# Patient Record
Sex: FEMALE | Race: WHITE | NOT HISPANIC OR LATINO | Employment: UNEMPLOYED
[De-identification: names, ages, dates, MRNs, and addresses within clinical notes are randomized per-mention and may not be internally consistent; named-entity substitution may affect disease eponyms.]

---

## 2017-01-20 ENCOUNTER — HOSPITAL (OUTPATIENT)
Dept: OTHER | Age: 72
End: 2017-01-20
Attending: INTERNAL MEDICINE

## 2017-02-08 ENCOUNTER — PRIOR ORIGINAL RECORDS (OUTPATIENT)
Dept: OTHER | Age: 72
End: 2017-02-08

## 2017-02-08 ENCOUNTER — HOSPITAL (OUTPATIENT)
Dept: OTHER | Age: 72
End: 2017-02-08
Attending: RADIOLOGY

## 2017-02-15 ENCOUNTER — CHARTING TRANS (OUTPATIENT)
Dept: OTHER | Age: 72
End: 2017-02-15

## 2017-02-22 ENCOUNTER — PRIOR ORIGINAL RECORDS (OUTPATIENT)
Dept: OTHER | Age: 72
End: 2017-02-22

## 2017-02-22 PROCEDURE — 81001 URINALYSIS AUTO W/SCOPE: CPT | Performed by: INTERNAL MEDICINE

## 2017-02-22 PROCEDURE — 82043 UR ALBUMIN QUANTITATIVE: CPT | Performed by: INTERNAL MEDICINE

## 2017-02-22 PROCEDURE — 82570 ASSAY OF URINE CREATININE: CPT | Performed by: INTERNAL MEDICINE

## 2017-02-22 PROCEDURE — 36415 COLL VENOUS BLD VENIPUNCTURE: CPT | Performed by: INTERNAL MEDICINE

## 2017-02-27 ENCOUNTER — CHARTING TRANS (OUTPATIENT)
Dept: OTHER | Age: 72
End: 2017-02-27

## 2017-02-28 ENCOUNTER — HOSPITAL (OUTPATIENT)
Dept: OTHER | Age: 72
End: 2017-02-28
Attending: ORTHOPAEDIC SURGERY

## 2017-02-28 LAB
BUN SERPL-MCNC: 19 MG/DL (ref 6–20)
BUN/CREAT SERPL: 23 (ref 7–25)
CREAT SERPL-MCNC: 0.83 MG/DL (ref 0.51–0.95)
GLUCOSE BLDC GLUCOMTR-MCNC: 106 MG/DL (ref 65–99)
GLUCOSE BLDC GLUCOMTR-MCNC: 126 MG/DL (ref 65–99)
GLUCOSE BLDC GLUCOMTR-MCNC: 132 MG/DL (ref 65–99)
GLUCOSE BLDC GLUCOMTR-MCNC: 135 MG/DL (ref 65–99)
GLUCOSE BLDC GLUCOMTR-MCNC: 161 MG/DL (ref 65–99)
INR PPP: 1
PROTHROMBIN TIME: 10.9 SECONDS (ref 9.7–11.8)
PROTHROMBIN TIME: NORMAL

## 2017-03-01 LAB
ANALYZER ANC (IANC): ABNORMAL
ANION GAP SERPL CALC-SCNC: 12 MMOL/L (ref 10–20)
BASOPHILS # BLD: 0 THOUSAND/MCL (ref 0–0.3)
BASOPHILS NFR BLD: 0 %
BUN SERPL-MCNC: 25 MG/DL (ref 6–20)
BUN/CREAT SERPL: 24 (ref 7–25)
CALCIUM SERPL-MCNC: 8.5 MG/DL (ref 8.4–10.2)
CHLORIDE: 107 MMOL/L (ref 98–107)
CO2 SERPL-SCNC: 29 MMOL/L (ref 21–32)
CREAT SERPL-MCNC: 1.03 MG/DL (ref 0.51–0.95)
DIFFERENTIAL METHOD BLD: ABNORMAL
EOSINOPHIL # BLD: 0.1 THOUSAND/MCL (ref 0.1–0.5)
EOSINOPHIL NFR BLD: 1 %
ERYTHROCYTE [DISTWIDTH] IN BLOOD: 14.7 % (ref 11–15)
GLUCOSE BLDC GLUCOMTR-MCNC: 105 MG/DL (ref 65–99)
GLUCOSE BLDC GLUCOMTR-MCNC: 141 MG/DL (ref 65–99)
GLUCOSE BLDC GLUCOMTR-MCNC: 157 MG/DL (ref 65–99)
GLUCOSE BLDC GLUCOMTR-MCNC: 170 MG/DL (ref 65–99)
GLUCOSE BLDC GLUCOMTR-MCNC: 98 MG/DL (ref 65–99)
GLUCOSE SERPL-MCNC: 112 MG/DL (ref 65–99)
HEMATOCRIT: 36.5 % (ref 36–46.5)
HGB BLD-MCNC: 11.3 GM/DL (ref 12–15.5)
LYMPHOCYTES # BLD: 1.8 THOUSAND/MCL (ref 1–4)
LYMPHOCYTES NFR BLD: 20 %
MCH RBC QN AUTO: 27.4 PG (ref 26–34)
MCHC RBC AUTO-ENTMCNC: 31 GM/DL (ref 32–36.5)
MCV RBC AUTO: 88.6 FL (ref 78–100)
MONOCYTES # BLD: 1.1 THOUSAND/MCL (ref 0.3–0.9)
MONOCYTES NFR BLD: 12 %
NEUTROPHILS # BLD: 6.1 THOUSAND/MCL (ref 1.8–7.7)
NEUTROPHILS NFR BLD: 67 %
NEUTS SEG NFR BLD: ABNORMAL %
PERCENT NRBC: ABNORMAL
PLATELET # BLD: 183 THOUSAND/MCL (ref 140–450)
POTASSIUM SERPL-SCNC: 5 MMOL/L (ref 3.4–5.1)
RBC # BLD: 4.12 MILLION/MCL (ref 4–5.2)
SODIUM SERPL-SCNC: 143 MMOL/L (ref 135–145)
WBC # BLD: 9.1 THOUSAND/MCL (ref 4.2–11)

## 2017-03-02 LAB
ANALYZER ANC (IANC): ABNORMAL
ANION GAP SERPL CALC-SCNC: 11 MMOL/L (ref 10–20)
BASOPHILS # BLD: 0 THOUSAND/MCL (ref 0–0.3)
BASOPHILS NFR BLD: 0 %
BUN SERPL-MCNC: 27 MG/DL (ref 6–20)
BUN/CREAT SERPL: 27 (ref 7–25)
CALCIUM SERPL-MCNC: 8.9 MG/DL (ref 8.4–10.2)
CHLORIDE: 105 MMOL/L (ref 98–107)
CO2 SERPL-SCNC: 28 MMOL/L (ref 21–32)
CREAT SERPL-MCNC: 1 MG/DL (ref 0.51–0.95)
DIFFERENTIAL METHOD BLD: ABNORMAL
EOSINOPHIL # BLD: 0.3 THOUSAND/MCL (ref 0.1–0.5)
EOSINOPHIL NFR BLD: 3 %
ERYTHROCYTE [DISTWIDTH] IN BLOOD: 14.9 % (ref 11–15)
GLUCOSE BLDC GLUCOMTR-MCNC: 107 MG/DL (ref 65–99)
GLUCOSE BLDC GLUCOMTR-MCNC: 115 MG/DL (ref 65–99)
GLUCOSE BLDC GLUCOMTR-MCNC: 91 MG/DL (ref 65–99)
GLUCOSE SERPL-MCNC: 116 MG/DL (ref 65–99)
HEMATOCRIT: 33.3 % (ref 36–46.5)
HGB BLD-MCNC: 10.4 GM/DL (ref 12–15.5)
LYMPHOCYTES # BLD: 1.2 THOUSAND/MCL (ref 1–4)
LYMPHOCYTES NFR BLD: 14 %
MCH RBC QN AUTO: 27.6 PG (ref 26–34)
MCHC RBC AUTO-ENTMCNC: 31.2 GM/DL (ref 32–36.5)
MCV RBC AUTO: 88.3 FL (ref 78–100)
MONOCYTES # BLD: 1 THOUSAND/MCL (ref 0.3–0.9)
MONOCYTES NFR BLD: 13 %
NEUTROPHILS # BLD: 5.7 THOUSAND/MCL (ref 1.8–7.7)
NEUTROPHILS NFR BLD: 70 %
NEUTS SEG NFR BLD: ABNORMAL %
PERCENT NRBC: ABNORMAL
PLATELET # BLD: 161 THOUSAND/MCL (ref 140–450)
POTASSIUM SERPL-SCNC: 4.4 MMOL/L (ref 3.4–5.1)
RBC # BLD: 3.77 MILLION/MCL (ref 4–5.2)
SODIUM SERPL-SCNC: 140 MMOL/L (ref 135–145)
WBC # BLD: 8.2 THOUSAND/MCL (ref 4.2–11)

## 2017-03-16 PROBLEM — Z47.89 ORTHOPEDIC AFTERCARE: Status: ACTIVE | Noted: 2017-03-16

## 2017-03-17 PROBLEM — Z96.651 STATUS POST TOTAL KNEE REPLACEMENT, RIGHT: Status: ACTIVE | Noted: 2017-03-17

## 2017-06-02 PROBLEM — C50.912 MALIGNANT NEOPLASM OF LEFT FEMALE BREAST, UNSPECIFIED ESTROGEN RECEPTOR STATUS, UNSPECIFIED SITE OF BREAST (HCC): Status: ACTIVE | Noted: 2017-06-02

## 2017-07-06 PROBLEM — S52.552A OTHER CLOSED EXTRA-ARTICULAR FRACTURE OF DISTAL END OF LEFT RADIUS, INITIAL ENCOUNTER: Status: ACTIVE | Noted: 2017-07-06

## 2017-07-12 ENCOUNTER — PRIOR ORIGINAL RECORDS (OUTPATIENT)
Dept: OTHER | Age: 72
End: 2017-07-12

## 2017-08-07 PROBLEM — S52.552D CLOSED EXTRAARTICULAR FRACTURE OF DISTAL RADIUS, LEFT, WITH ROUTINE HEALING, SUBSEQUENT ENCOUNTER: Status: ACTIVE | Noted: 2017-07-06

## 2017-09-06 PROBLEM — Z96.653 STATUS POST TOTAL BILATERAL KNEE REPLACEMENT: Status: ACTIVE | Noted: 2017-03-17

## 2017-10-12 PROBLEM — G47.33 OSA (OBSTRUCTIVE SLEEP APNEA): Status: ACTIVE | Noted: 2017-10-12

## 2017-10-12 PROBLEM — S52.552D CLOSED EXTRAARTICULAR FRACTURE OF DISTAL RADIUS, LEFT, WITH ROUTINE HEALING, SUBSEQUENT ENCOUNTER: Status: RESOLVED | Noted: 2017-07-06 | Resolved: 2017-10-12

## 2017-10-12 PROBLEM — M85.80 OSTEOPENIA, UNSPECIFIED LOCATION: Status: ACTIVE | Noted: 2017-10-12

## 2017-11-15 ENCOUNTER — PRIOR ORIGINAL RECORDS (OUTPATIENT)
Dept: OTHER | Age: 72
End: 2017-11-15

## 2017-12-31 ENCOUNTER — PRIOR ORIGINAL RECORDS (OUTPATIENT)
Dept: OTHER | Age: 72
End: 2017-12-31

## 2018-01-23 ENCOUNTER — HOSPITAL (OUTPATIENT)
Dept: OTHER | Age: 73
End: 2018-01-23
Attending: INTERNAL MEDICINE

## 2018-02-08 ENCOUNTER — HOSPITAL (OUTPATIENT)
Dept: OTHER | Age: 73
End: 2018-02-08
Attending: RADIOLOGY

## 2018-02-08 ENCOUNTER — PRIOR ORIGINAL RECORDS (OUTPATIENT)
Dept: OTHER | Age: 73
End: 2018-02-08

## 2018-02-22 ENCOUNTER — CHARTING TRANS (OUTPATIENT)
Dept: OTHER | Age: 73
End: 2018-02-22

## 2018-03-14 ENCOUNTER — PRIOR ORIGINAL RECORDS (OUTPATIENT)
Dept: OTHER | Age: 73
End: 2018-03-14

## 2018-03-19 PROBLEM — S82.821A CLOSED TORUS FRACTURE OF DISTAL END OF RIGHT FIBULA, INITIAL ENCOUNTER: Status: ACTIVE | Noted: 2018-03-19

## 2018-04-30 PROBLEM — N18.30 CKD (CHRONIC KIDNEY DISEASE) STAGE 3, GFR 30-59 ML/MIN (HCC): Status: ACTIVE | Noted: 2018-04-30

## 2018-09-12 ENCOUNTER — PRIOR ORIGINAL RECORDS (OUTPATIENT)
Dept: OTHER | Age: 73
End: 2018-09-12

## 2018-11-05 VITALS
SYSTOLIC BLOOD PRESSURE: 138 MMHG | HEIGHT: 68 IN | WEIGHT: 293 LBS | BODY MASS INDEX: 44.41 KG/M2 | DIASTOLIC BLOOD PRESSURE: 73 MMHG

## 2019-01-09 PROBLEM — Z96.653 STATUS POST BILATERAL KNEE REPLACEMENTS: Status: ACTIVE | Noted: 2019-01-09

## 2019-01-09 PROBLEM — S82.821A CLOSED TORUS FRACTURE OF DISTAL END OF RIGHT FIBULA, INITIAL ENCOUNTER: Status: RESOLVED | Noted: 2018-03-19 | Resolved: 2019-01-09

## 2019-01-09 PROCEDURE — 36415 COLL VENOUS BLD VENIPUNCTURE: CPT | Performed by: INTERNAL MEDICINE

## 2019-01-09 PROCEDURE — 82570 ASSAY OF URINE CREATININE: CPT | Performed by: INTERNAL MEDICINE

## 2019-01-09 PROCEDURE — 82043 UR ALBUMIN QUANTITATIVE: CPT | Performed by: INTERNAL MEDICINE

## 2019-02-05 ENCOUNTER — HOSPITAL (OUTPATIENT)
Dept: OTHER | Age: 74
End: 2019-02-05
Attending: INTERNAL MEDICINE

## 2019-02-14 PROCEDURE — 88305 TISSUE EXAM BY PATHOLOGIST: CPT | Performed by: INTERNAL MEDICINE

## 2019-03-13 ENCOUNTER — PRIOR ORIGINAL RECORDS (OUTPATIENT)
Dept: OTHER | Age: 74
End: 2019-03-13

## 2019-09-11 ENCOUNTER — PRIOR ORIGINAL RECORDS (OUTPATIENT)
Dept: OTHER | Age: 74
End: 2019-09-11

## 2019-09-30 PROBLEM — I50.32 CHRONIC DIASTOLIC CHF (CONGESTIVE HEART FAILURE) (HCC): Status: ACTIVE | Noted: 2019-09-30

## 2020-02-07 ENCOUNTER — HOSPITAL (OUTPATIENT)
Dept: OTHER | Age: 75
End: 2020-02-07
Attending: INTERNAL MEDICINE

## 2020-02-09 VITALS
WEIGHT: 293 LBS | BODY MASS INDEX: 43.4 KG/M2 | SYSTOLIC BLOOD PRESSURE: 132 MMHG | RESPIRATION RATE: 16 BRPM | DIASTOLIC BLOOD PRESSURE: 79 MMHG | HEART RATE: 94 BPM | HEIGHT: 69 IN

## 2020-02-09 VITALS
BODY MASS INDEX: 43.4 KG/M2 | HEART RATE: 97 BPM | DIASTOLIC BLOOD PRESSURE: 80 MMHG | HEIGHT: 69 IN | WEIGHT: 293 LBS | RESPIRATION RATE: 20 BRPM | SYSTOLIC BLOOD PRESSURE: 141 MMHG

## 2020-03-13 ENCOUNTER — PRIOR ORIGINAL RECORDS (OUTPATIENT)
Dept: OTHER | Age: 75
End: 2020-03-13

## 2020-03-13 ENCOUNTER — HOSPITAL (OUTPATIENT)
Dept: OTHER | Age: 75
End: 2020-03-13

## 2020-03-13 PROCEDURE — 99213 OFFICE O/P EST LOW 20 MIN: CPT | Performed by: INTERNAL MEDICINE

## 2020-04-01 ENCOUNTER — HOSPITAL (OUTPATIENT)
Dept: OTHER | Age: 75
End: 2020-04-01

## 2020-05-01 ENCOUNTER — HOSPITAL (OUTPATIENT)
Dept: OTHER | Age: 75
End: 2020-05-01

## 2020-06-01 ENCOUNTER — HOSPITAL (OUTPATIENT)
Dept: OTHER | Age: 75
End: 2020-06-01

## 2020-07-01 ENCOUNTER — HOSPITAL (OUTPATIENT)
Dept: OTHER | Age: 75
End: 2020-07-01
Attending: INTERNAL MEDICINE

## 2020-09-15 ENCOUNTER — HOSPITAL (OUTPATIENT)
Dept: OTHER | Age: 75
End: 2020-09-15
Attending: INTERNAL MEDICINE

## 2020-09-16 ENCOUNTER — HOSPITAL (OUTPATIENT)
Dept: OTHER | Age: 75
End: 2020-09-16
Attending: INTERNAL MEDICINE

## 2020-09-18 ENCOUNTER — PRIOR ORIGINAL RECORDS (OUTPATIENT)
Dept: OTHER | Age: 75
End: 2020-09-18

## 2020-09-18 PROCEDURE — 99214 OFFICE O/P EST MOD 30 MIN: CPT | Performed by: INTERNAL MEDICINE

## 2020-10-09 LAB
ALBUMIN/GLOB SERPL: 1.4 (CALC) (ref 1–2.5)
ALBUMIN/GLOB SERPL: 1.7 (CALC) (ref 1–2.5)
ALBUMIN: 3.9 G/DL (ref 3.6–5.1)
ALBUMIN: 4 G/DL (ref 3.6–5.1)
ALKALINE PHOSPHATASE: 52 UNIT/L (ref 33–130)
ALKALINE PHOSPHATASE: 60 UNIT/L (ref 33–130)
ALT: 14 UNIT/L (ref 6–29)
ALT: 14 UNIT/L (ref 6–29)
AST: 11 UNIT/L (ref 10–35)
AST: 14 UNIT/L (ref 10–35)
BASO%: 0.1 %
BASO%: 0.2 %
BASO%: 0.2 %
BASO%: 0.3 %
BASO%: 0.3 %
BASO: 0 10^3/UL
BILIRUBIN, TOTAL: 0.5 MG/DL (ref 0.2–1.2)
BILIRUBIN, TOTAL: 0.6 MG/DL (ref 0.2–1.2)
BUN/CREATININE RATIO: 33 (CALC) (ref 6–22)
BUN/CREATININE RATIO: ABNORMAL (CALC) (ref 6–22)
CA 15-3: 11 UNIT/ML
CA 15-3: 8 UNIT/ML
CA 27.29: 16 UNIT/ML
CA 27.29: 18 UNIT/ML
CALCIUM: 9.2 MG/DL (ref 8.6–10.4)
CALCIUM: 9.5 MG/DL (ref 8.6–10.4)
CARBON DIOXIDE: 22 MMOL/L (ref 20–32)
CARBON DIOXIDE: 24 MMOL/L (ref 20–32)
CEA: 0.6 NG/ML
CEA: 0.9 NG/ML
CHLORIDE: 102 MMOL/L (ref 98–110)
CHLORIDE: 106 MMOL/L (ref 98–110)
CRCL (C&G) (MOSAIQ HL): 127.31 ML/MIN
CRCL (C&G) (MOSAIQ HL): 66.02 ML/MIN
CRCL (C&G) (MOSAIQ HL): 94 ML/MIN
CREATININE CLEARANCE (MOSAIQ HL): 30.4 ML/MIN
CREATININE CLEARANCE (MOSAIQ HL): 56 ML/MIN
CREATININE: 0.88 MG/DL (ref 0.6–0.93)
CREATININE: 1.62 MG/DL (ref 0.6–0.93)
EGFR AFRICAN AMERICAN: 36 ML/MIN/1.73M2
EGFR AFRICAN AMERICAN: 75 ML/MIN/1.73M2
EGFR NON-AFR. AMERICAN: 31 ML/MIN/1.73M2
EGFR NON-AFR. AMERICAN: 65 ML/MIN/1.73M2
EOS%: 1.3 %
EOS%: 1.8 %
EOS%: 2.1 %
EOS%: 2.3 %
EOS%: 2.5 %
EOS: 0.1 10^3/UL
EOS: 0.2 10^3/UL
GLOBULIN: 2.3 G/DL (CALC) (ref 1.9–3.7)
GLOBULIN: 2.8 G/DL (CALC) (ref 1.9–3.7)
GLUCOSE: 145 MG/DL (ref 65–99)
GLUCOSE: 166 MG/DL (ref 65–99)
HCT: 41.8 % (ref 38–54)
HCT: 43.2 % (ref 38–54)
HCT: 43.8 % (ref 38–54)
HCT: 43.9 % (ref 38–54)
HCT: 44.1 % (ref 38–54)
HCT: 45.7 % (ref 38–54)
HCT: 46.3 % (ref 38–54)
HCT: 47.5 % (ref 38–54)
HGB: 13.9 G/DL (ref 12–18)
HGB: 14.1 G/DL (ref 12–18)
HGB: 14.2 G/DL (ref 12–18)
HGB: 14.3 G/DL (ref 12–18)
HGB: 14.5 G/DL (ref 12–18)
HGB: 14.8 G/DL (ref 12–18)
HGB: 15.1 G/DL (ref 12–18)
HGB: 15.3 G/DL (ref 12–18)
LYMPH%: 17.6 % (ref 12–44)
LYMPH%: 17.9 % (ref 12–44)
LYMPH%: 18.3 % (ref 12–44)
LYMPH%: 20.4 % (ref 12–44)
LYMPH%: 20.9 % (ref 12–44)
LYMPH%: 21 % (ref 12–44)
LYMPH%: 23.1 % (ref 12–44)
LYMPH%: 23.8 % (ref 12–44)
LYMPH: 1.5 10^3/UL (ref 0.8–2.8)
LYMPH: 1.6 10^3/UL (ref 0.8–2.8)
LYMPH: 1.6 10^3/UL (ref 0.8–2.8)
LYMPH: 1.7 10^3/UL (ref 0.8–2.8)
LYMPH: 1.7 10^3/UL (ref 0.8–2.8)
LYMPH: 1.8 10^3/UL (ref 0.8–2.8)
LYMPH: 1.9 10^3/UL (ref 0.8–2.8)
LYMPH: 2.2 10^3/UL (ref 0.8–2.8)
MCH: 28.3 PG (ref 26–33)
MCH: 29 PG (ref 26–33)
MCH: 29.1 PG (ref 26–33)
MCH: 30.4 PG (ref 26–33)
MCH: 30.5 PG (ref 26–33)
MCH: 30.9 PG (ref 26–33)
MCH: 30.9 PG (ref 26–33)
MCH: 31 PG (ref 26–33)
MCHC: 31.7 G/DL (ref 31–36)
MCHC: 32 G/DL (ref 31–36)
MCHC: 32.2 G/DL (ref 31–36)
MCHC: 32.4 G/DL (ref 31–36)
MCHC: 32.9 G/DL (ref 31–36)
MCHC: 33 G/DL (ref 31–36)
MCHC: 33 G/DL (ref 31–36)
MCHC: 33.7 G/DL (ref 31–36)
MCV: 88.3 FML (ref 82–100)
MCV: 89 FML (ref 82–100)
MCV: 91.1 FML (ref 82–100)
MCV: 91.7 FML (ref 82–100)
MCV: 92 FML (ref 82–100)
MCV: 93.6 FML (ref 82–100)
MCV: 94.6 FML (ref 82–100)
MCV: 95.7 FML (ref 82–100)
MONO%: 6.8 % (ref 2–12)
MONO%: 7 % (ref 2–12)
MONO%: 7.3 % (ref 2–12)
MONO%: 7.6 % (ref 2–12)
MONO%: 7.8 % (ref 2–12)
MONO%: 8.4 % (ref 2–12)
MONO%: 9 % (ref 2–12)
MONO%: 9.1 % (ref 2–12)
MONO: 0.6 10^3/UL (ref 0.2–1)
MONO: 0.7 10^3/UL (ref 0.2–1)
MONO: 0.8 10^3/UL (ref 0.2–1)
MPV: 10.3 FML (ref 8.6–11.7)
MPV: 10.4 FML (ref 8.6–11.7)
MPV: 9 FML (ref 8.6–11.7)
MPV: 9.3 FML (ref 8.6–11.7)
MPV: 9.4 FML (ref 8.6–11.7)
MPV: 9.6 FML (ref 8.6–11.7)
MPV: 9.7 FML (ref 8.6–11.7)
MPV: 9.9 FML (ref 8.6–11.7)
NEUT%: 66 % (ref 47–76)
NEUT%: 66.3 % (ref 47–76)
NEUT%: 67.8 % (ref 47–76)
NEUT%: 69.1 % (ref 47–76)
NEUT%: 69.9 % (ref 47–76)
NEUT%: 71 % (ref 47–76)
NEUT%: 72.5 % (ref 47–76)
NEUT%: 73.7 % (ref 47–76)
NEUT: 5.4 10^3/UL (ref 1.5–7.1)
NEUT: 5.4 10^3/UL (ref 1.5–7.1)
NEUT: 5.7 10^3/UL (ref 1.5–7.1)
NEUT: 5.9 10^3/UL (ref 1.5–7.1)
NEUT: 6 10^3/UL (ref 1.5–7.1)
NEUT: 6 10^3/UL (ref 1.5–7.1)
NEUT: 6.3 10^3/UL (ref 1.5–7.1)
NEUT: 7 10^3/UL (ref 1.5–7.1)
PLT: 175 10^3/UL (ref 150–375)
PLT: 184 10^3/UL (ref 150–375)
PLT: 201 10^3/UL (ref 150–375)
PLT: 201 10^3/UL (ref 150–375)
PLT: 203 10^3/UL (ref 150–375)
PLT: 206 10^3/UL (ref 150–375)
PLT: 232 10^3/UL (ref 150–375)
PLT: 232 10^3/UL (ref 150–375)
POTASSIUM: 4.5 MMOL/L (ref 3.5–5.3)
POTASSIUM: 4.9 MMOL/L (ref 3.5–5.3)
PROTEIN, TOTAL: 6.3 G/DL (ref 6.1–8.1)
PROTEIN, TOTAL: 6.7 G/DL (ref 6.1–8.1)
RBC: 4.56 10^6/UL (ref 4.2–6.2)
RBC: 4.61 10^6/UL (ref 4.2–6.2)
RBC: 4.77 10^6/UL (ref 4.2–6.2)
RBC: 4.88 10^6/UL (ref 4.2–6.2)
RBC: 4.89 10^6/UL (ref 4.2–6.2)
RBC: 4.92 10^6/UL (ref 4.2–6.2)
RBC: 5.02 10^6/UL (ref 4.2–6.2)
RBC: 5.08 10^6/UL (ref 4.2–6.2)
RDW-CV: 13.8 %
RDW-CV: 13.9 %
RDW-CV: 14.2 %
RDW-CV: 14.2 %
RDW-CV: 14.4 %
RDW-CV: 14.6 %
RDW-CV: 14.6 %
RDW-CV: 15.9 %
RDW-SD: 45.3 FML (ref 36–50)
RDW-SD: 46.1 FML (ref 36–50)
RDW-SD: 46.7 FML (ref 36–50)
RDW-SD: 47 FML (ref 36–50)
RDW-SD: 47 FML (ref 36–50)
RDW-SD: 47.8 FML (ref 36–50)
RDW-SD: 48.2 FML (ref 36–50)
RDW-SD: 50.1 FML (ref 36–50)
SODIUM: 140 MMOL/L (ref 135–146)
SODIUM: 145 MMOL/L (ref 135–146)
UREA NITROGEN (BUN): 18 MG/DL (ref 7–25)
UREA NITROGEN (BUN): 54 MG/DL (ref 7–25)
WBC: 8 10^3/UL (ref 4.3–11)
WBC: 8.2 10^3/UL (ref 4.3–11)
WBC: 8.3 10^3/UL (ref 4.3–11)
WBC: 8.3 10^3/UL (ref 4.3–11)
WBC: 8.6 10^3/UL (ref 4.3–11)
WBC: 8.7 10^3/UL (ref 4.3–11)
WBC: 9.1 10^3/UL (ref 4.3–11)
WBC: 9.6 10^3/UL (ref 4.3–11)

## 2020-10-11 VITALS — SYSTOLIC BLOOD PRESSURE: 126 MMHG | WEIGHT: 293 LBS | BODY MASS INDEX: 44.85 KG/M2 | DIASTOLIC BLOOD PRESSURE: 74 MMHG

## 2020-10-11 VITALS
DIASTOLIC BLOOD PRESSURE: 84 MMHG | SYSTOLIC BLOOD PRESSURE: 122 MMHG | WEIGHT: 293 LBS | HEIGHT: 67 IN | BODY MASS INDEX: 45.99 KG/M2

## 2020-10-11 VITALS
BODY MASS INDEX: 45.99 KG/M2 | HEIGHT: 67 IN | WEIGHT: 293 LBS | DIASTOLIC BLOOD PRESSURE: 72 MMHG | SYSTOLIC BLOOD PRESSURE: 134 MMHG

## 2020-10-11 VITALS — SYSTOLIC BLOOD PRESSURE: 118 MMHG | WEIGHT: 293 LBS | DIASTOLIC BLOOD PRESSURE: 68 MMHG | BODY MASS INDEX: 44.69 KG/M2

## 2020-10-11 VITALS — WEIGHT: 293 LBS | DIASTOLIC BLOOD PRESSURE: 72 MMHG | SYSTOLIC BLOOD PRESSURE: 136 MMHG | BODY MASS INDEX: 45.19 KG/M2

## 2020-10-11 VITALS
HEIGHT: 67 IN | BODY MASS INDEX: 45.99 KG/M2 | WEIGHT: 293 LBS | DIASTOLIC BLOOD PRESSURE: 74 MMHG | SYSTOLIC BLOOD PRESSURE: 142 MMHG

## 2020-10-11 VITALS
DIASTOLIC BLOOD PRESSURE: 60 MMHG | SYSTOLIC BLOOD PRESSURE: 119 MMHG | BODY MASS INDEX: 45.99 KG/M2 | WEIGHT: 293 LBS | HEIGHT: 67 IN

## 2020-10-11 VITALS — SYSTOLIC BLOOD PRESSURE: 146 MMHG | BODY MASS INDEX: 45.62 KG/M2 | WEIGHT: 293 LBS | DIASTOLIC BLOOD PRESSURE: 94 MMHG

## 2020-10-13 VITALS — SYSTOLIC BLOOD PRESSURE: 115 MMHG | BODY MASS INDEX: 47.93 KG/M2 | WEIGHT: 293 LBS | DIASTOLIC BLOOD PRESSURE: 52 MMHG

## 2020-10-13 LAB
BASO%: 0.3 %
BASO: 0 10^3/UL
EOS%: 2.1 %
EOS: 0.2 10^3/UL
HCT: 45.1 % (ref 38–54)
HGB: 14.4 G/DL (ref 12–18)
LYMPH%: 19.5 % (ref 12–44)
LYMPH: 1.8 10^3/UL (ref 0.8–2.8)
MCH: 30.6 PG (ref 26–33)
MCHC: 31.9 G/DL (ref 31–36)
MCV: 96 FML (ref 82–100)
MONO%: 7.8 % (ref 2–12)
MONO: 0.7 10^3/UL (ref 0.2–1)
MPV: 10.3 FML (ref 8.6–11.7)
NEUT%: 70.3 % (ref 47–76)
NEUT: 6.4 10^3/UL (ref 1.5–7.1)
PLT: 201 10^3/UL (ref 150–375)
RBC: 4.7 10^6/UL (ref 4.2–6.2)
RDW-CV: 14.4 %
RDW-SD: 49.1 FML (ref 36–50)
WBC: 9.1 10^3/UL (ref 4.3–11)

## 2020-12-08 PROBLEM — E89.2 POSTPROCEDURAL HYPOPARATHYROIDISM (HCC): Status: ACTIVE | Noted: 2020-12-08

## 2020-12-31 ENCOUNTER — PRIOR ORIGINAL RECORDS (OUTPATIENT)
Dept: OTHER | Age: 75
End: 2020-12-31

## 2021-03-11 ENCOUNTER — HOSPITAL ENCOUNTER (OUTPATIENT)
Dept: MAMMOGRAPHY | Age: 76
Discharge: HOME | End: 2021-03-11
Attending: INTERNAL MEDICINE

## 2021-03-11 ENCOUNTER — HOSPITAL ENCOUNTER (OUTPATIENT)
Dept: GENERAL RADIOLOGY | Age: 76
Discharge: HOME | End: 2021-03-11
Attending: INTERNAL MEDICINE

## 2021-03-11 DIAGNOSIS — Z12.31 ENCOUNTER FOR SCREENING MAMMOGRAM FOR MALIGNANT NEOPLASM OF BREAST: ICD-10-CM

## 2021-03-11 DIAGNOSIS — Z13.820 ENCOUNTER FOR SCREENING FOR OSTEOPOROSIS: ICD-10-CM

## 2021-03-11 PROCEDURE — 77063 BREAST TOMOSYNTHESIS BI: CPT

## 2021-03-11 PROCEDURE — 77080 DXA BONE DENSITY AXIAL: CPT

## 2021-03-12 DIAGNOSIS — M85.80 OSTEOPENIA, UNSPECIFIED LOCATION: Primary | ICD-10-CM

## 2021-03-12 RX ORDER — FAMOTIDINE 10 MG/ML
20 INJECTION, SOLUTION INTRAVENOUS
Status: CANCELLED | OUTPATIENT
Start: 2021-03-19

## 2021-03-12 RX ORDER — ALBUTEROL SULFATE 90 UG/1
2 AEROSOL, METERED RESPIRATORY (INHALATION)
Status: CANCELLED | OUTPATIENT
Start: 2021-03-19

## 2021-03-12 RX ORDER — METHYLPREDNISOLONE SODIUM SUCCINATE 125 MG/2ML
125 INJECTION, POWDER, LYOPHILIZED, FOR SOLUTION INTRAMUSCULAR; INTRAVENOUS
Status: CANCELLED | OUTPATIENT
Start: 2021-03-19

## 2021-03-12 RX ORDER — DIPHENHYDRAMINE HYDROCHLORIDE 50 MG/ML
50 INJECTION INTRAMUSCULAR; INTRAVENOUS
Status: CANCELLED
Start: 2021-03-19

## 2021-03-19 ENCOUNTER — OFFICE VISIT (OUTPATIENT)
Dept: HEMATOLOGY/ONCOLOGY | Age: 76
End: 2021-03-19
Attending: INTERNAL MEDICINE

## 2021-03-19 VITALS
DIASTOLIC BLOOD PRESSURE: 77 MMHG | TEMPERATURE: 98 F | HEART RATE: 81 BPM | SYSTOLIC BLOOD PRESSURE: 153 MMHG | WEIGHT: 293 LBS | RESPIRATION RATE: 18 BRPM | HEIGHT: 68 IN | OXYGEN SATURATION: 99 % | BODY MASS INDEX: 44.41 KG/M2

## 2021-03-19 DIAGNOSIS — Z17.0 MALIGNANT NEOPLASM OF LOWER-INNER QUADRANT OF LEFT BREAST IN FEMALE, ESTROGEN RECEPTOR POSITIVE (CMD): Primary | ICD-10-CM

## 2021-03-19 DIAGNOSIS — C50.312 MALIGNANT NEOPLASM OF LOWER-INNER QUADRANT OF LEFT BREAST IN FEMALE, ESTROGEN RECEPTOR POSITIVE (CMD): Primary | ICD-10-CM

## 2021-03-19 PROCEDURE — 99214 OFFICE O/P EST MOD 30 MIN: CPT | Performed by: INTERNAL MEDICINE

## 2021-03-19 RX ORDER — LETROZOLE 2.5 MG/1
2.5 TABLET, FILM COATED ORAL DAILY
Qty: 90 TABLET | Refills: 1 | Status: CANCELLED | OUTPATIENT
Start: 2021-03-19 | End: 2021-07-17

## 2021-03-19 ASSESSMENT — ENCOUNTER SYMPTOMS
ABDOMINAL DISTENTION: 0
ADENOPATHY: 0
CONFUSION: 0
SLEEP DISTURBANCE: 0
APNEA: 0
CHOKING: 0
NAUSEA: 0
VOMITING: 0
UNEXPECTED WEIGHT CHANGE: 0
APPETITE CHANGE: 0
COUGH: 0
HEADACHES: 0
VOICE CHANGE: 0
WHEEZING: 0
FATIGUE: 0
BACK PAIN: 0
BRUISES/BLEEDS EASILY: 0
WEAKNESS: 0
TROUBLE SWALLOWING: 0
DIARRHEA: 0
SPEECH DIFFICULTY: 0
LIGHT-HEADEDNESS: 0
ABDOMINAL PAIN: 0
FEVER: 0
CONSTIPATION: 0
BLOOD IN STOOL: 0
ACTIVITY CHANGE: 0
DIAPHORESIS: 0
CHEST TIGHTNESS: 0
CHILLS: 0
DIZZINESS: 0
SHORTNESS OF BREATH: 0

## 2021-03-19 ASSESSMENT — PATIENT HEALTH QUESTIONNAIRE - PHQ9
SUM OF ALL RESPONSES TO PHQ9 QUESTIONS 1 AND 2: 0
1. LITTLE INTEREST OR PLEASURE IN DOING THINGS: NOT AT ALL
SUM OF ALL RESPONSES TO PHQ9 QUESTIONS 1 AND 2: 0
2. FEELING DOWN, DEPRESSED OR HOPELESS: NOT AT ALL
CLINICAL INTERPRETATION OF PHQ2 SCORE: NO FURTHER SCREENING NEEDED
CLINICAL INTERPRETATION OF PHQ9 SCORE: NO FURTHER SCREENING NEEDED

## 2021-03-29 DIAGNOSIS — C50.312 MALIGNANT NEOPLASM OF LOWER-INNER QUADRANT OF LEFT BREAST IN FEMALE, ESTROGEN RECEPTOR POSITIVE (CMD): Primary | ICD-10-CM

## 2021-03-29 DIAGNOSIS — Z17.0 MALIGNANT NEOPLASM OF LOWER-INNER QUADRANT OF LEFT BREAST IN FEMALE, ESTROGEN RECEPTOR POSITIVE (CMD): Primary | ICD-10-CM

## 2021-03-29 RX ORDER — EXEMESTANE 25 MG/1
25 TABLET ORAL DAILY
Qty: 90 TABLET | Refills: 3 | Status: SHIPPED | OUTPATIENT
Start: 2021-03-29 | End: 2022-03-24

## 2021-03-30 PROBLEM — E11.22 TYPE 2 DIABETES MELLITUS WITH STAGE 3 CHRONIC KIDNEY DISEASE, WITHOUT LONG-TERM CURRENT USE OF INSULIN, UNSPECIFIED WHETHER STAGE 3A OR 3B CKD (HCC): Status: ACTIVE | Noted: 2021-03-30

## 2021-03-30 PROBLEM — N18.30 TYPE 2 DIABETES MELLITUS WITH STAGE 3 CHRONIC KIDNEY DISEASE, WITHOUT LONG-TERM CURRENT USE OF INSULIN, UNSPECIFIED WHETHER STAGE 3A OR 3B CKD (HCC): Status: ACTIVE | Noted: 2021-03-30

## 2021-03-30 PROBLEM — N18.30 STAGE 3 CHRONIC KIDNEY DISEASE, UNSPECIFIED WHETHER STAGE 3A OR 3B CKD (HCC): Status: ACTIVE | Noted: 2021-03-30

## 2021-03-30 PROBLEM — E89.2 POSTPROCEDURAL HYPOPARATHYROIDISM (HCC): Status: RESOLVED | Noted: 2020-12-08 | Resolved: 2021-03-30

## 2021-04-16 ENCOUNTER — HOSPITAL ENCOUNTER (OUTPATIENT)
Dept: LAB | Age: 76
Discharge: STILL ADMITTED OR ADMINISTRATIVE | End: 2021-04-16
Attending: INTERNAL MEDICINE

## 2021-04-16 ENCOUNTER — NURSE ONLY (OUTPATIENT)
Dept: HEMATOLOGY/ONCOLOGY | Age: 76
End: 2021-04-16
Attending: INTERNAL MEDICINE

## 2021-04-16 DIAGNOSIS — M85.80 OSTEOPENIA, UNSPECIFIED LOCATION: Primary | ICD-10-CM

## 2021-04-16 DIAGNOSIS — M85.80 OSTEOPENIA, UNSPECIFIED LOCATION: ICD-10-CM

## 2021-04-16 DIAGNOSIS — C50.312 MALIGNANT NEOPLASM OF LOWER-INNER QUADRANT OF LEFT BREAST IN FEMALE, ESTROGEN RECEPTOR POSITIVE (CMD): ICD-10-CM

## 2021-04-16 DIAGNOSIS — Z17.0 MALIGNANT NEOPLASM OF LOWER-INNER QUADRANT OF LEFT BREAST IN FEMALE, ESTROGEN RECEPTOR POSITIVE (CMD): ICD-10-CM

## 2021-04-16 LAB
ALBUMIN SERPL-MCNC: 3.6 G/DL (ref 3.6–5.1)
ALBUMIN/GLOB SERPL: 1 {RATIO} (ref 1–2.4)
ALP SERPL-CCNC: 66 UNITS/L (ref 45–117)
ALT SERPL-CCNC: 23 UNITS/L
ANION GAP SERPL CALC-SCNC: 8 MMOL/L (ref 10–20)
AST SERPL-CCNC: 12 UNITS/L
BASOPHILS # BLD: 0 K/MCL (ref 0–0.3)
BASOPHILS NFR BLD: 0 %
BILIRUB SERPL-MCNC: 0.7 MG/DL (ref 0.2–1)
BUN SERPL-MCNC: 36 MG/DL (ref 6–20)
BUN/CREAT SERPL: 30 (ref 7–25)
CALCIUM SERPL-MCNC: 10 MG/DL (ref 8.4–10.2)
CEA SERPL-MCNC: 0.7 NG/ML (ref 0–5)
CHLORIDE SERPL-SCNC: 109 MMOL/L (ref 98–107)
CO2 SERPL-SCNC: 29 MMOL/L (ref 21–32)
CREAT SERPL-MCNC: 1.21 MG/DL (ref 0.51–0.95)
DEPRECATED RDW RBC: 47.8 FL (ref 39–50)
EOSINOPHIL # BLD: 0.2 K/MCL (ref 0–0.5)
EOSINOPHIL NFR BLD: 2 %
ERYTHROCYTE [DISTWIDTH] IN BLOOD: 13.5 % (ref 11–15)
FASTING DURATION TIME PATIENT: ABNORMAL H
GFR SERPLBLD BASED ON 1.73 SQ M-ARVRAT: 44 ML/MIN/1.73M2
GLOBULIN SER-MCNC: 3.7 G/DL (ref 2–4)
GLUCOSE SERPL-MCNC: 140 MG/DL (ref 65–99)
HCT VFR BLD CALC: 46.7 % (ref 36–46.5)
HGB BLD-MCNC: 14.8 G/DL (ref 12–15.5)
IMM GRANULOCYTES # BLD AUTO: 0 K/MCL (ref 0–0.2)
IMM GRANULOCYTES # BLD: 0 %
LYMPHOCYTES # BLD: 1.4 K/MCL (ref 1–4)
LYMPHOCYTES NFR BLD: 15 %
MCH RBC QN AUTO: 30.3 PG (ref 26–34)
MCHC RBC AUTO-ENTMCNC: 31.7 G/DL (ref 32–36.5)
MCV RBC AUTO: 95.7 FL (ref 78–100)
MONOCYTES # BLD: 0.6 K/MCL (ref 0.3–0.9)
MONOCYTES NFR BLD: 6 %
NEUTROPHILS # BLD: 7.2 K/MCL (ref 1.8–7.7)
NEUTROPHILS NFR BLD: 77 %
NRBC BLD MANUAL-RTO: 0 /100 WBC
PLATELET # BLD AUTO: 216 K/MCL (ref 140–450)
POTASSIUM SERPL-SCNC: 4.8 MMOL/L (ref 3.4–5.1)
PROT SERPL-MCNC: 7.3 G/DL (ref 6.4–8.2)
RBC # BLD: 4.88 MIL/MCL (ref 4–5.2)
SODIUM SERPL-SCNC: 141 MMOL/L (ref 135–145)
WBC # BLD: 9.4 K/MCL (ref 4.2–11)

## 2021-04-16 PROCEDURE — 10002800 HB RX 250 W HCPCS: Performed by: INTERNAL MEDICINE

## 2021-04-16 PROCEDURE — 96372 THER/PROPH/DIAG INJ SC/IM: CPT

## 2021-04-16 PROCEDURE — 86300 IMMUNOASSAY TUMOR CA 15-3: CPT | Performed by: INTERNAL MEDICINE

## 2021-04-16 PROCEDURE — 36415 COLL VENOUS BLD VENIPUNCTURE: CPT | Performed by: INTERNAL MEDICINE

## 2021-04-16 PROCEDURE — 82378 CARCINOEMBRYONIC ANTIGEN: CPT | Performed by: INTERNAL MEDICINE

## 2021-04-16 PROCEDURE — 85025 COMPLETE CBC W/AUTO DIFF WBC: CPT | Performed by: INTERNAL MEDICINE

## 2021-04-16 PROCEDURE — 80053 COMPREHEN METABOLIC PANEL: CPT | Performed by: INTERNAL MEDICINE

## 2021-04-16 RX ADMIN — DENOSUMAB 60 MG: 60 INJECTION SUBCUTANEOUS at 11:02

## 2021-04-17 LAB
CANCER AG15-3 SERPL-ACNC: 7 UNITS/ML (ref 0–32)
CANCER AG27-29 SERPL-ACNC: 15.1 UNITS/ML (ref 0–40)

## 2021-09-13 RX ORDER — DIPHENHYDRAMINE HYDROCHLORIDE 50 MG/ML
50 INJECTION INTRAMUSCULAR; INTRAVENOUS
Status: CANCELLED
Start: 2021-09-17

## 2021-09-13 RX ORDER — FAMOTIDINE 10 MG/ML
20 INJECTION, SOLUTION INTRAVENOUS
Status: CANCELLED | OUTPATIENT
Start: 2021-09-17

## 2021-09-13 RX ORDER — METHYLPREDNISOLONE SODIUM SUCCINATE 125 MG/2ML
125 INJECTION, POWDER, LYOPHILIZED, FOR SOLUTION INTRAMUSCULAR; INTRAVENOUS
Status: CANCELLED | OUTPATIENT
Start: 2021-09-17

## 2021-09-13 RX ORDER — ALBUTEROL SULFATE 90 UG/1
2 AEROSOL, METERED RESPIRATORY (INHALATION)
Status: CANCELLED | OUTPATIENT
Start: 2021-09-17

## 2021-09-17 ENCOUNTER — HOSPITAL ENCOUNTER (OUTPATIENT)
Dept: LAB | Age: 76
Discharge: STILL ADMITTED OR ADMINISTRATIVE | End: 2021-09-17
Attending: INTERNAL MEDICINE

## 2021-09-17 ENCOUNTER — NURSE ONLY (OUTPATIENT)
Dept: HEMATOLOGY/ONCOLOGY | Age: 76
End: 2021-09-17
Attending: INTERNAL MEDICINE

## 2021-09-17 ENCOUNTER — OFFICE VISIT (OUTPATIENT)
Dept: HEMATOLOGY/ONCOLOGY | Age: 76
End: 2021-09-17
Attending: INTERNAL MEDICINE

## 2021-09-17 VITALS
DIASTOLIC BLOOD PRESSURE: 60 MMHG | HEART RATE: 93 BPM | WEIGHT: 293 LBS | SYSTOLIC BLOOD PRESSURE: 104 MMHG | OXYGEN SATURATION: 95 % | BODY MASS INDEX: 47.52 KG/M2

## 2021-09-17 DIAGNOSIS — M85.80 OSTEOPENIA, UNSPECIFIED LOCATION: Primary | ICD-10-CM

## 2021-09-17 DIAGNOSIS — C50.312 MALIGNANT NEOPLASM OF LOWER-INNER QUADRANT OF LEFT BREAST IN FEMALE, ESTROGEN RECEPTOR POSITIVE (CMD): ICD-10-CM

## 2021-09-17 DIAGNOSIS — Z17.0 MALIGNANT NEOPLASM OF LOWER-INNER QUADRANT OF LEFT BREAST IN FEMALE, ESTROGEN RECEPTOR POSITIVE (CMD): ICD-10-CM

## 2021-09-17 DIAGNOSIS — M85.80 OSTEOPENIA, UNSPECIFIED LOCATION: ICD-10-CM

## 2021-09-17 DIAGNOSIS — Z12.31 ENCOUNTER FOR SCREENING MAMMOGRAM FOR MALIGNANT NEOPLASM OF BREAST: ICD-10-CM

## 2021-09-17 LAB
ALBUMIN SERPL-MCNC: 3.6 G/DL (ref 3.6–5.1)
ALBUMIN/GLOB SERPL: 1.1 {RATIO} (ref 1–2.4)
ALP SERPL-CCNC: 65 UNITS/L (ref 45–117)
ALT SERPL-CCNC: 27 UNITS/L
ANION GAP SERPL CALC-SCNC: 13 MMOL/L (ref 10–20)
AST SERPL-CCNC: 12 UNITS/L
BASOPHILS # BLD: 0.1 K/MCL (ref 0–0.3)
BASOPHILS NFR BLD: 1 %
BILIRUB SERPL-MCNC: 0.5 MG/DL (ref 0.2–1)
BUN SERPL-MCNC: 34 MG/DL (ref 6–20)
BUN/CREAT SERPL: 29 (ref 7–25)
CALCIUM SERPL-MCNC: 9.3 MG/DL (ref 8.4–10.2)
CEA SERPL-MCNC: 0.9 NG/ML (ref 0–5)
CHLORIDE SERPL-SCNC: 105 MMOL/L (ref 98–107)
CO2 SERPL-SCNC: 30 MMOL/L (ref 21–32)
CREAT SERPL-MCNC: 1.17 MG/DL (ref 0.51–0.95)
DEPRECATED RDW RBC: 49.4 FL (ref 39–50)
EOSINOPHIL # BLD: 0.2 K/MCL (ref 0–0.5)
EOSINOPHIL NFR BLD: 2 %
ERYTHROCYTE [DISTWIDTH] IN BLOOD: 14.2 % (ref 11–15)
FASTING DURATION TIME PATIENT: ABNORMAL H
GFR SERPLBLD BASED ON 1.73 SQ M-ARVRAT: 45 ML/MIN
GLOBULIN SER-MCNC: 3.4 G/DL (ref 2–4)
GLUCOSE SERPL-MCNC: 148 MG/DL (ref 65–99)
HCT VFR BLD CALC: 47.1 % (ref 36–46.5)
HGB BLD-MCNC: 14.9 G/DL (ref 12–15.5)
IMM GRANULOCYTES # BLD AUTO: 0 K/MCL (ref 0–0.2)
IMM GRANULOCYTES # BLD: 0 %
LYMPHOCYTES # BLD: 1.7 K/MCL (ref 1–4)
LYMPHOCYTES NFR BLD: 19 %
MCH RBC QN AUTO: 30.3 PG (ref 26–34)
MCHC RBC AUTO-ENTMCNC: 31.6 G/DL (ref 32–36.5)
MCV RBC AUTO: 95.7 FL (ref 78–100)
MONOCYTES # BLD: 0.7 K/MCL (ref 0.3–0.9)
MONOCYTES NFR BLD: 7 %
NEUTROPHILS # BLD: 6.6 K/MCL (ref 1.8–7.7)
NEUTROPHILS NFR BLD: 71 %
NRBC BLD MANUAL-RTO: 0 /100 WBC
PLATELET # BLD AUTO: 223 K/MCL (ref 140–450)
POTASSIUM SERPL-SCNC: 4.7 MMOL/L (ref 3.4–5.1)
PROT SERPL-MCNC: 7 G/DL (ref 6.4–8.2)
RBC # BLD: 4.92 MIL/MCL (ref 4–5.2)
SODIUM SERPL-SCNC: 143 MMOL/L (ref 135–145)
WBC # BLD: 9.2 K/MCL (ref 4.2–11)

## 2021-09-17 PROCEDURE — 85025 COMPLETE CBC W/AUTO DIFF WBC: CPT | Performed by: INTERNAL MEDICINE

## 2021-09-17 PROCEDURE — 86300 IMMUNOASSAY TUMOR CA 15-3: CPT | Performed by: INTERNAL MEDICINE

## 2021-09-17 PROCEDURE — 82378 CARCINOEMBRYONIC ANTIGEN: CPT | Performed by: INTERNAL MEDICINE

## 2021-09-17 PROCEDURE — 80053 COMPREHEN METABOLIC PANEL: CPT | Performed by: INTERNAL MEDICINE

## 2021-09-17 PROCEDURE — 99214 OFFICE O/P EST MOD 30 MIN: CPT | Performed by: INTERNAL MEDICINE

## 2021-09-17 PROCEDURE — 96372 THER/PROPH/DIAG INJ SC/IM: CPT

## 2021-09-17 PROCEDURE — 10002800 HB RX 250 W HCPCS: Performed by: INTERNAL MEDICINE

## 2021-09-17 PROCEDURE — 36415 COLL VENOUS BLD VENIPUNCTURE: CPT | Performed by: INTERNAL MEDICINE

## 2021-09-17 RX ORDER — LANCETS 33 GAUGE
EACH MISCELLANEOUS
COMMUNITY
Start: 2021-07-01 | End: 2022-08-01

## 2021-09-17 RX ORDER — ATORVASTATIN CALCIUM 40 MG/1
40 TABLET, FILM COATED ORAL EVERY EVENING
COMMUNITY
Start: 2021-08-30

## 2021-09-17 RX ADMIN — DENOSUMAB 60 MG: 60 INJECTION SUBCUTANEOUS at 14:57

## 2021-09-17 ASSESSMENT — PATIENT HEALTH QUESTIONNAIRE - PHQ9
SUM OF ALL RESPONSES TO PHQ9 QUESTIONS 1 AND 2: 0
SUM OF ALL RESPONSES TO PHQ9 QUESTIONS 1 AND 2: 0
2. FEELING DOWN, DEPRESSED OR HOPELESS: NOT AT ALL
CLINICAL INTERPRETATION OF PHQ2 SCORE: NO FURTHER SCREENING NEEDED
CLINICAL INTERPRETATION OF PHQ9 SCORE: NO FURTHER SCREENING NEEDED
1. LITTLE INTEREST OR PLEASURE IN DOING THINGS: NOT AT ALL

## 2021-09-17 ASSESSMENT — ENCOUNTER SYMPTOMS
CHEST TIGHTNESS: 0
VOMITING: 0
CONFUSION: 0
ABDOMINAL DISTENTION: 0
FEVER: 0
ACTIVITY CHANGE: 0
APPETITE CHANGE: 0
ABDOMINAL PAIN: 0
VOICE CHANGE: 0
CHOKING: 0
UNEXPECTED WEIGHT CHANGE: 0
TROUBLE SWALLOWING: 0
HEADACHES: 0
BACK PAIN: 0
APNEA: 0
DIZZINESS: 0
DIAPHORESIS: 0
BRUISES/BLEEDS EASILY: 0
LIGHT-HEADEDNESS: 0
CHILLS: 0
ADENOPATHY: 0
COUGH: 0
NAUSEA: 0
BLOOD IN STOOL: 0
WHEEZING: 0
WEAKNESS: 0
SHORTNESS OF BREATH: 0
SLEEP DISTURBANCE: 0
SPEECH DIFFICULTY: 0
CONSTIPATION: 0
FATIGUE: 0
DIARRHEA: 0

## 2021-09-18 LAB
CANCER AG15-3 SERPL-ACNC: 11 UNITS/ML (ref 0–32)
CANCER AG27-29 SERPL-ACNC: 14.5 UNITS/ML (ref 0–40)

## 2022-01-12 PROBLEM — I27.20 MODERATE PULMONARY HYPERTENSION (HCC): Status: ACTIVE | Noted: 2022-01-12

## 2022-01-12 PROBLEM — E11.22 TYPE 2 DIABETES MELLITUS WITH STAGE 3B CHRONIC KIDNEY DISEASE, WITHOUT LONG-TERM CURRENT USE OF INSULIN (HCC): Status: ACTIVE | Noted: 2021-03-30

## 2022-01-12 PROBLEM — N18.32 TYPE 2 DIABETES MELLITUS WITH STAGE 3B CHRONIC KIDNEY DISEASE, WITHOUT LONG-TERM CURRENT USE OF INSULIN (HCC): Status: ACTIVE | Noted: 2021-03-30

## 2022-01-12 PROBLEM — I50.32 HYPERTENSIVE HEART AND KIDNEY DISEASE WITH CHRONIC DIASTOLIC CONGESTIVE HEART FAILURE AND STAGE 3B CHRONIC KIDNEY DISEASE (HCC): Status: ACTIVE | Noted: 2022-01-12

## 2022-01-12 PROBLEM — N18.32 HYPERTENSIVE HEART AND KIDNEY DISEASE WITH CHRONIC DIASTOLIC CONGESTIVE HEART FAILURE AND STAGE 3B CHRONIC KIDNEY DISEASE (HCC): Status: ACTIVE | Noted: 2022-01-12

## 2022-01-12 PROBLEM — I77.1 TORTUOUS AORTA (HCC): Status: ACTIVE | Noted: 2022-01-12

## 2022-01-12 PROBLEM — N18.32 STAGE 3B CHRONIC KIDNEY DISEASE (HCC): Status: ACTIVE | Noted: 2021-03-30

## 2022-01-12 PROBLEM — I77.1 TORTUOUS AORTA: Status: ACTIVE | Noted: 2022-01-12

## 2022-01-12 PROBLEM — I13.0 HYPERTENSIVE HEART AND KIDNEY DISEASE WITH CHRONIC DIASTOLIC CONGESTIVE HEART FAILURE AND STAGE 3B CHRONIC KIDNEY DISEASE (HCC): Status: ACTIVE | Noted: 2022-01-12

## 2022-02-14 DIAGNOSIS — Z12.31 ENCOUNTER FOR SCREENING MAMMOGRAM FOR HIGH-RISK PATIENT: Primary | ICD-10-CM

## 2022-03-14 ENCOUNTER — HOSPITAL ENCOUNTER (OUTPATIENT)
Dept: MAMMOGRAPHY | Age: 77
Discharge: HOME | End: 2022-03-14
Attending: INTERNAL MEDICINE

## 2022-03-14 DIAGNOSIS — Z12.31 ENCOUNTER FOR SCREENING MAMMOGRAM FOR HIGH-RISK PATIENT: ICD-10-CM

## 2022-03-14 PROCEDURE — 77063 BREAST TOMOSYNTHESIS BI: CPT

## 2022-03-18 ENCOUNTER — APPOINTMENT (OUTPATIENT)
Dept: LAB | Age: 77
End: 2022-03-18
Attending: INTERNAL MEDICINE

## 2022-03-18 ENCOUNTER — APPOINTMENT (OUTPATIENT)
Dept: HEMATOLOGY/ONCOLOGY | Age: 77
End: 2022-03-18
Attending: INTERNAL MEDICINE

## 2022-03-21 ENCOUNTER — OFFICE VISIT (OUTPATIENT)
Dept: HEMATOLOGY/ONCOLOGY | Age: 77
End: 2022-03-21
Attending: INTERNAL MEDICINE

## 2022-03-21 ENCOUNTER — NURSE ONLY (OUTPATIENT)
Dept: HEMATOLOGY/ONCOLOGY | Age: 77
End: 2022-03-21
Attending: INTERNAL MEDICINE

## 2022-03-21 ENCOUNTER — HOSPITAL ENCOUNTER (OUTPATIENT)
Dept: LAB | Age: 77
Discharge: STILL ADMITTED OR ADMINISTRATIVE | End: 2022-03-21
Attending: INTERNAL MEDICINE

## 2022-03-21 VITALS
SYSTOLIC BLOOD PRESSURE: 139 MMHG | HEART RATE: 94 BPM | RESPIRATION RATE: 13 BRPM | OXYGEN SATURATION: 93 % | DIASTOLIC BLOOD PRESSURE: 67 MMHG | TEMPERATURE: 97.6 F | BODY MASS INDEX: 44.41 KG/M2 | WEIGHT: 293 LBS | HEIGHT: 68 IN

## 2022-03-21 DIAGNOSIS — Z17.0 MALIGNANT NEOPLASM OF LOWER-INNER QUADRANT OF LEFT BREAST IN FEMALE, ESTROGEN RECEPTOR POSITIVE (CMD): ICD-10-CM

## 2022-03-21 DIAGNOSIS — M85.80 OSTEOPENIA, UNSPECIFIED LOCATION: ICD-10-CM

## 2022-03-21 DIAGNOSIS — M85.80 OSTEOPENIA, UNSPECIFIED LOCATION: Primary | ICD-10-CM

## 2022-03-21 DIAGNOSIS — Z17.0 MALIGNANT NEOPLASM OF LOWER-INNER QUADRANT OF LEFT BREAST IN FEMALE, ESTROGEN RECEPTOR POSITIVE (CMD): Primary | ICD-10-CM

## 2022-03-21 DIAGNOSIS — C50.312 MALIGNANT NEOPLASM OF LOWER-INNER QUADRANT OF LEFT BREAST IN FEMALE, ESTROGEN RECEPTOR POSITIVE (CMD): Primary | ICD-10-CM

## 2022-03-21 DIAGNOSIS — C50.312 MALIGNANT NEOPLASM OF LOWER-INNER QUADRANT OF LEFT BREAST IN FEMALE, ESTROGEN RECEPTOR POSITIVE (CMD): ICD-10-CM

## 2022-03-21 LAB
ALBUMIN SERPL-MCNC: 3.6 G/DL (ref 3.6–5.1)
ALBUMIN/GLOB SERPL: 0.9 {RATIO} (ref 1–2.4)
ALP SERPL-CCNC: 68 UNITS/L (ref 45–117)
ALT SERPL-CCNC: 25 UNITS/L
ANION GAP SERPL CALC-SCNC: 9 MMOL/L (ref 10–20)
AST SERPL-CCNC: 14 UNITS/L
BASOPHILS # BLD: 0 K/MCL (ref 0–0.3)
BASOPHILS NFR BLD: 0 %
BILIRUB SERPL-MCNC: 0.5 MG/DL (ref 0.2–1)
BUN SERPL-MCNC: 25 MG/DL (ref 6–20)
BUN/CREAT SERPL: 23 (ref 7–25)
CALCIUM SERPL-MCNC: 10.2 MG/DL (ref 8.4–10.2)
CANCER AG27-29 SERPL-ACNC: 17.7 UNITS/ML (ref 0–40)
CEA SERPL-MCNC: 0.8 NG/ML (ref 0–5)
CHLORIDE SERPL-SCNC: 103 MMOL/L (ref 98–107)
CO2 SERPL-SCNC: 35 MMOL/L (ref 21–32)
CREAT SERPL-MCNC: 1.08 MG/DL (ref 0.51–0.95)
DEPRECATED RDW RBC: 53.1 FL (ref 39–50)
EOSINOPHIL # BLD: 0.2 K/MCL (ref 0–0.5)
EOSINOPHIL NFR BLD: 2 %
ERYTHROCYTE [DISTWIDTH] IN BLOOD: 14.5 % (ref 11–15)
FASTING DURATION TIME PATIENT: ABNORMAL H
GFR SERPLBLD BASED ON 1.73 SQ M-ARVRAT: 50 ML/MIN
GLOBULIN SER-MCNC: 3.9 G/DL (ref 2–4)
GLUCOSE SERPL-MCNC: 115 MG/DL (ref 70–99)
HCT VFR BLD CALC: 51 % (ref 36–46.5)
HGB BLD-MCNC: 15.7 G/DL (ref 12–15.5)
IMM GRANULOCYTES # BLD AUTO: 0 K/MCL (ref 0–0.2)
IMM GRANULOCYTES # BLD: 0 %
LYMPHOCYTES # BLD: 1.7 K/MCL (ref 1–4)
LYMPHOCYTES NFR BLD: 18 %
MCH RBC QN AUTO: 30.4 PG (ref 26–34)
MCHC RBC AUTO-ENTMCNC: 30.8 G/DL (ref 32–36.5)
MCV RBC AUTO: 98.8 FL (ref 78–100)
MONOCYTES # BLD: 0.6 K/MCL (ref 0.3–0.9)
MONOCYTES NFR BLD: 7 %
NEUTROPHILS # BLD: 6.9 K/MCL (ref 1.8–7.7)
NEUTROPHILS NFR BLD: 73 %
NRBC BLD MANUAL-RTO: 0 /100 WBC
PLATELET # BLD AUTO: 198 K/MCL (ref 140–450)
POTASSIUM SERPL-SCNC: 4.2 MMOL/L (ref 3.4–5.1)
PROT SERPL-MCNC: 7.5 G/DL (ref 6.4–8.2)
RBC # BLD: 5.16 MIL/MCL (ref 4–5.2)
SODIUM SERPL-SCNC: 143 MMOL/L (ref 135–145)
WBC # BLD: 9.5 K/MCL (ref 4.2–11)

## 2022-03-21 PROCEDURE — 82378 CARCINOEMBRYONIC ANTIGEN: CPT | Performed by: INTERNAL MEDICINE

## 2022-03-21 PROCEDURE — 99214 OFFICE O/P EST MOD 30 MIN: CPT | Performed by: INTERNAL MEDICINE

## 2022-03-21 PROCEDURE — 86300 IMMUNOASSAY TUMOR CA 15-3: CPT | Performed by: INTERNAL MEDICINE

## 2022-03-21 PROCEDURE — 36415 COLL VENOUS BLD VENIPUNCTURE: CPT | Performed by: INTERNAL MEDICINE

## 2022-03-21 PROCEDURE — 96372 THER/PROPH/DIAG INJ SC/IM: CPT

## 2022-03-21 PROCEDURE — 85025 COMPLETE CBC W/AUTO DIFF WBC: CPT | Performed by: INTERNAL MEDICINE

## 2022-03-21 PROCEDURE — 80053 COMPREHEN METABOLIC PANEL: CPT | Performed by: INTERNAL MEDICINE

## 2022-03-21 PROCEDURE — 10002800 HB RX 250 W HCPCS: Performed by: INTERNAL MEDICINE

## 2022-03-21 RX ORDER — METHYLPREDNISOLONE SODIUM SUCCINATE 125 MG/2ML
125 INJECTION, POWDER, LYOPHILIZED, FOR SOLUTION INTRAMUSCULAR; INTRAVENOUS
Status: CANCELLED | OUTPATIENT
Start: 2022-03-21

## 2022-03-21 RX ORDER — FAMOTIDINE 10 MG/ML
20 INJECTION, SOLUTION INTRAVENOUS
Status: CANCELLED | OUTPATIENT
Start: 2022-03-21

## 2022-03-21 RX ORDER — ALBUTEROL SULFATE 90 UG/1
2 AEROSOL, METERED RESPIRATORY (INHALATION)
Status: CANCELLED | OUTPATIENT
Start: 2022-03-21

## 2022-03-21 RX ORDER — DIPHENHYDRAMINE HYDROCHLORIDE 50 MG/ML
50 INJECTION INTRAMUSCULAR; INTRAVENOUS
Status: CANCELLED
Start: 2022-03-21

## 2022-03-21 RX ADMIN — DENOSUMAB 60 MG: 60 INJECTION SUBCUTANEOUS at 13:55

## 2022-03-21 ASSESSMENT — ENCOUNTER SYMPTOMS
BRUISES/BLEEDS EASILY: 0
FEVER: 0
NAUSEA: 0
UNEXPECTED WEIGHT CHANGE: 0
APPETITE CHANGE: 0
WHEEZING: 0
TROUBLE SWALLOWING: 0
DIAPHORESIS: 0
VOMITING: 0
ABDOMINAL DISTENTION: 0
CONFUSION: 0
DIZZINESS: 0
ABDOMINAL PAIN: 0
CONSTIPATION: 0
VOICE CHANGE: 0
COUGH: 0
SPEECH DIFFICULTY: 0
APNEA: 0
CHEST TIGHTNESS: 0
HEADACHES: 0
WEAKNESS: 0
BLOOD IN STOOL: 0
ACTIVITY CHANGE: 0
SLEEP DISTURBANCE: 0
DIARRHEA: 0
CHILLS: 0
ADENOPATHY: 0
BACK PAIN: 0
LIGHT-HEADEDNESS: 0
FATIGUE: 0
CHOKING: 0
SHORTNESS OF BREATH: 0

## 2022-03-21 ASSESSMENT — PATIENT HEALTH QUESTIONNAIRE - PHQ9
SUM OF ALL RESPONSES TO PHQ9 QUESTIONS 1 AND 2: 0
1. LITTLE INTEREST OR PLEASURE IN DOING THINGS: NOT AT ALL
2. FEELING DOWN, DEPRESSED OR HOPELESS: NOT AT ALL
CLINICAL INTERPRETATION OF PHQ2 SCORE: NO FURTHER SCREENING NEEDED
SUM OF ALL RESPONSES TO PHQ9 QUESTIONS 1 AND 2: 0

## 2022-03-22 LAB — CANCER AG15-3 SERPL-ACNC: 8 UNITS/ML (ref 0–32)

## 2022-03-23 DIAGNOSIS — Z17.0 MALIGNANT NEOPLASM OF LOWER-INNER QUADRANT OF LEFT BREAST IN FEMALE, ESTROGEN RECEPTOR POSITIVE (CMD): ICD-10-CM

## 2022-03-23 DIAGNOSIS — C50.312 MALIGNANT NEOPLASM OF LOWER-INNER QUADRANT OF LEFT BREAST IN FEMALE, ESTROGEN RECEPTOR POSITIVE (CMD): ICD-10-CM

## 2022-03-24 RX ORDER — EXEMESTANE 25 MG/1
TABLET ORAL
Qty: 90 TABLET | Refills: 3 | Status: SHIPPED | OUTPATIENT
Start: 2022-03-24

## 2022-07-31 ENCOUNTER — APPOINTMENT (OUTPATIENT)
Dept: GENERAL RADIOLOGY | Age: 77
DRG: 189 | End: 2022-07-31
Attending: EMERGENCY MEDICINE

## 2022-07-31 PROCEDURE — 73610 X-RAY EXAM OF ANKLE: CPT

## 2022-07-31 PROCEDURE — 36415 COLL VENOUS BLD VENIPUNCTURE: CPT

## 2022-07-31 PROCEDURE — 99285 EMERGENCY DEPT VISIT HI MDM: CPT

## 2022-08-01 ENCOUNTER — APPOINTMENT (OUTPATIENT)
Dept: ULTRASOUND IMAGING | Age: 77
DRG: 189 | End: 2022-08-01
Attending: EMERGENCY MEDICINE

## 2022-08-01 ENCOUNTER — HOSPITAL ENCOUNTER (INPATIENT)
Age: 77
LOS: 2 days | Discharge: NURSING, REHAB OR LONG-TERM CARE | DRG: 189 | End: 2022-08-04
Attending: EMERGENCY MEDICINE | Admitting: INTERNAL MEDICINE

## 2022-08-01 DIAGNOSIS — S82.892A CLOSED FRACTURE OF LEFT ANKLE, INITIAL ENCOUNTER: Primary | ICD-10-CM

## 2022-08-01 PROBLEM — S82.202A TIBIA/FIBULA FRACTURE, LEFT, CLOSED, INITIAL ENCOUNTER: Status: ACTIVE | Noted: 2022-08-01

## 2022-08-01 PROBLEM — S82.402A TIBIA/FIBULA FRACTURE, LEFT, CLOSED, INITIAL ENCOUNTER: Status: ACTIVE | Noted: 2022-08-01

## 2022-08-01 LAB
ALBUMIN SERPL-MCNC: 3.2 G/DL (ref 3.6–5.1)
ALBUMIN/GLOB SERPL: 0.8 {RATIO} (ref 1–2.4)
ALP SERPL-CCNC: 74 UNITS/L (ref 45–117)
ALT SERPL-CCNC: 21 UNITS/L
ANION GAP SERPL CALC-SCNC: 6 MMOL/L (ref 7–19)
ANION GAP SERPL CALC-SCNC: 7 MMOL/L (ref 7–19)
AST SERPL-CCNC: 16 UNITS/L
BASOPHILS # BLD: 0 K/MCL (ref 0–0.3)
BASOPHILS # BLD: 0 K/MCL (ref 0–0.3)
BASOPHILS NFR BLD: 0 %
BASOPHILS NFR BLD: 0 %
BILIRUB SERPL-MCNC: 0.7 MG/DL (ref 0.2–1)
BUN SERPL-MCNC: 38 MG/DL (ref 6–20)
BUN SERPL-MCNC: 39 MG/DL (ref 6–20)
BUN/CREAT SERPL: 34 (ref 7–25)
BUN/CREAT SERPL: 35 (ref 7–25)
CALCIUM SERPL-MCNC: 8.6 MG/DL (ref 8.4–10.2)
CALCIUM SERPL-MCNC: 8.9 MG/DL (ref 8.4–10.2)
CHLORIDE SERPL-SCNC: 109 MMOL/L (ref 97–110)
CHLORIDE SERPL-SCNC: 111 MMOL/L (ref 97–110)
CO2 SERPL-SCNC: 32 MMOL/L (ref 21–32)
CO2 SERPL-SCNC: 33 MMOL/L (ref 21–32)
CREAT SERPL-MCNC: 1.09 MG/DL (ref 0.51–0.95)
CREAT SERPL-MCNC: 1.14 MG/DL (ref 0.51–0.95)
DEPRECATED RDW RBC: 57.5 FL (ref 39–50)
DEPRECATED RDW RBC: 59 FL (ref 39–50)
EOSINOPHIL # BLD: 0.1 K/MCL (ref 0–0.5)
EOSINOPHIL # BLD: 0.1 K/MCL (ref 0–0.5)
EOSINOPHIL NFR BLD: 1 %
EOSINOPHIL NFR BLD: 1 %
ERYTHROCYTE [DISTWIDTH] IN BLOOD: 15.9 % (ref 11–15)
ERYTHROCYTE [DISTWIDTH] IN BLOOD: 16 % (ref 11–15)
FASTING DURATION TIME PATIENT: ABNORMAL H
FASTING DURATION TIME PATIENT: ABNORMAL H
FLUAV RNA RESP QL NAA+PROBE: NOT DETECTED
FLUBV RNA RESP QL NAA+PROBE: NOT DETECTED
GFR SERPLBLD BASED ON 1.73 SQ M-ARVRAT: 50 ML/MIN
GFR SERPLBLD BASED ON 1.73 SQ M-ARVRAT: 52 ML/MIN
GLOBULIN SER-MCNC: 3.9 G/DL (ref 2–4)
GLUCOSE BLDC GLUCOMTR-MCNC: 141 MG/DL (ref 70–99)
GLUCOSE BLDC GLUCOMTR-MCNC: 73 MG/DL (ref 70–99)
GLUCOSE SERPL-MCNC: 103 MG/DL (ref 70–99)
GLUCOSE SERPL-MCNC: 88 MG/DL (ref 70–99)
HCT VFR BLD CALC: 44.7 % (ref 36–46.5)
HCT VFR BLD CALC: 47.9 % (ref 36–46.5)
HGB BLD-MCNC: 12.9 G/DL (ref 12–15.5)
HGB BLD-MCNC: 14.4 G/DL (ref 12–15.5)
IMM GRANULOCYTES # BLD AUTO: 0 K/MCL (ref 0–0.2)
IMM GRANULOCYTES # BLD AUTO: 0.1 K/MCL (ref 0–0.2)
IMM GRANULOCYTES # BLD: 0 %
IMM GRANULOCYTES # BLD: 1 %
LYMPHOCYTES # BLD: 1 K/MCL (ref 1–4)
LYMPHOCYTES # BLD: 1.4 K/MCL (ref 1–4)
LYMPHOCYTES NFR BLD: 10 %
LYMPHOCYTES NFR BLD: 16 %
MCH RBC QN AUTO: 28.7 PG (ref 26–34)
MCH RBC QN AUTO: 29.3 PG (ref 26–34)
MCHC RBC AUTO-ENTMCNC: 28.9 G/DL (ref 32–36.5)
MCHC RBC AUTO-ENTMCNC: 30.1 G/DL (ref 32–36.5)
MCV RBC AUTO: 97.6 FL (ref 78–100)
MCV RBC AUTO: 99.6 FL (ref 78–100)
MONOCYTES # BLD: 0.9 K/MCL (ref 0.3–0.9)
MONOCYTES # BLD: 1 K/MCL (ref 0.3–0.9)
MONOCYTES NFR BLD: 11 %
MONOCYTES NFR BLD: 9 %
NEUTROPHILS # BLD: 6.3 K/MCL (ref 1.8–7.7)
NEUTROPHILS # BLD: 7.7 K/MCL (ref 1.8–7.7)
NEUTROPHILS NFR BLD: 72 %
NEUTROPHILS NFR BLD: 79 %
NRBC BLD MANUAL-RTO: 0 /100 WBC
NRBC BLD MANUAL-RTO: 0 /100 WBC
PLATELET # BLD AUTO: 205 K/MCL (ref 140–450)
PLATELET # BLD AUTO: 222 K/MCL (ref 140–450)
POTASSIUM SERPL-SCNC: 4.6 MMOL/L (ref 3.4–5.1)
POTASSIUM SERPL-SCNC: 5.1 MMOL/L (ref 3.4–5.1)
PROT SERPL-MCNC: 7.1 G/DL (ref 6.4–8.2)
RAINBOW EXTRA TUBES HOLD SPECIMEN: NORMAL
RBC # BLD: 4.49 MIL/MCL (ref 4–5.2)
RBC # BLD: 4.91 MIL/MCL (ref 4–5.2)
RSV AG NPH QL IA.RAPID: NOT DETECTED
SARS-COV-2 RNA RESP QL NAA+PROBE: NOT DETECTED
SERVICE CMNT-IMP: NORMAL
SERVICE CMNT-IMP: NORMAL
SODIUM SERPL-SCNC: 144 MMOL/L (ref 135–145)
SODIUM SERPL-SCNC: 144 MMOL/L (ref 135–145)
WBC # BLD: 8.8 K/MCL (ref 4.2–11)
WBC # BLD: 9.7 K/MCL (ref 4.2–11)

## 2022-08-01 PROCEDURE — 80053 COMPREHEN METABOLIC PANEL: CPT | Performed by: EMERGENCY MEDICINE

## 2022-08-01 PROCEDURE — 84443 ASSAY THYROID STIM HORMONE: CPT | Performed by: PSYCHIATRY & NEUROLOGY

## 2022-08-01 PROCEDURE — 0241U COVID/FLU/RSV PANEL: CPT | Performed by: EMERGENCY MEDICINE

## 2022-08-01 PROCEDURE — 13003289 HB OXYGEN THERAPY DAILY

## 2022-08-01 PROCEDURE — 85025 COMPLETE CBC W/AUTO DIFF WBC: CPT | Performed by: EMERGENCY MEDICINE

## 2022-08-01 PROCEDURE — 36415 COLL VENOUS BLD VENIPUNCTURE: CPT | Performed by: INTERNAL MEDICINE

## 2022-08-01 PROCEDURE — 29515 APPLICATION SHORT LEG SPLINT: CPT

## 2022-08-01 PROCEDURE — 82962 GLUCOSE BLOOD TEST: CPT

## 2022-08-01 PROCEDURE — G0378 HOSPITAL OBSERVATION PER HR: HCPCS

## 2022-08-01 PROCEDURE — 10002803 HB RX 637: Performed by: HOSPITALIST

## 2022-08-01 PROCEDURE — 96372 THER/PROPH/DIAG INJ SC/IM: CPT

## 2022-08-01 PROCEDURE — 10002016 HB COUNTER INCENTIVE SPIROMETRY

## 2022-08-01 PROCEDURE — 86376 MICROSOMAL ANTIBODY EACH: CPT | Performed by: PSYCHIATRY & NEUROLOGY

## 2022-08-01 PROCEDURE — 80048 BASIC METABOLIC PNL TOTAL CA: CPT | Performed by: INTERNAL MEDICINE

## 2022-08-01 PROCEDURE — 10002803 HB RX 637: Performed by: INTERNAL MEDICINE

## 2022-08-01 PROCEDURE — 85025 COMPLETE CBC W/AUTO DIFF WBC: CPT | Performed by: INTERNAL MEDICINE

## 2022-08-01 PROCEDURE — 82746 ASSAY OF FOLIC ACID SERUM: CPT | Performed by: PSYCHIATRY & NEUROLOGY

## 2022-08-01 PROCEDURE — 10002800 HB RX 250 W HCPCS: Performed by: HOSPITALIST

## 2022-08-01 PROCEDURE — 10004651 HB RX, NO CHARGE ITEM: Performed by: HOSPITALIST

## 2022-08-01 PROCEDURE — 10004651 HB RX, NO CHARGE ITEM: Performed by: INTERNAL MEDICINE

## 2022-08-01 PROCEDURE — 29515 APPLICATION SHORT LEG SPLINT: CPT | Performed by: EMERGENCY MEDICINE

## 2022-08-01 PROCEDURE — 13001086 HB INCENTIVE SPIROMETER W INSTRUCT

## 2022-08-01 RX ORDER — CARVEDILOL 12.5 MG/1
12.5 TABLET ORAL EVERY 12 HOURS SCHEDULED
Status: DISCONTINUED | OUTPATIENT
Start: 2022-08-01 | End: 2022-08-04 | Stop reason: HOSPADM

## 2022-08-01 RX ORDER — DEXTROSE MONOHYDRATE 25 G/50ML
12.5 INJECTION, SOLUTION INTRAVENOUS PRN
Status: DISCONTINUED | OUTPATIENT
Start: 2022-08-01 | End: 2022-08-04 | Stop reason: HOSPADM

## 2022-08-01 RX ORDER — 0.9 % SODIUM CHLORIDE 0.9 %
2 VIAL (ML) INJECTION EVERY 12 HOURS SCHEDULED
Status: DISCONTINUED | OUTPATIENT
Start: 2022-08-01 | End: 2022-08-04 | Stop reason: HOSPADM

## 2022-08-01 RX ORDER — HYDROCODONE BITARTRATE AND ACETAMINOPHEN 5; 325 MG/1; MG/1
1 TABLET ORAL EVERY 4 HOURS PRN
Status: DISCONTINUED | OUTPATIENT
Start: 2022-08-01 | End: 2022-08-02

## 2022-08-01 RX ORDER — NICOTINE POLACRILEX 4 MG
15 LOZENGE BUCCAL PRN
Status: DISCONTINUED | OUTPATIENT
Start: 2022-08-01 | End: 2022-08-04 | Stop reason: HOSPADM

## 2022-08-01 RX ORDER — NICOTINE POLACRILEX 4 MG
30 LOZENGE BUCCAL PRN
Status: DISCONTINUED | OUTPATIENT
Start: 2022-08-01 | End: 2022-08-04 | Stop reason: HOSPADM

## 2022-08-01 RX ORDER — AMLODIPINE BESYLATE 5 MG/1
5 TABLET ORAL DAILY
Status: DISCONTINUED | OUTPATIENT
Start: 2022-08-01 | End: 2022-08-03

## 2022-08-01 RX ORDER — FUROSEMIDE 20 MG/1
40 TABLET ORAL DAILY
Status: DISCONTINUED | OUTPATIENT
Start: 2022-08-02 | End: 2022-08-04 | Stop reason: HOSPADM

## 2022-08-01 RX ORDER — ACETAMINOPHEN 325 MG/1
650 TABLET ORAL EVERY 6 HOURS PRN
Status: DISCONTINUED | OUTPATIENT
Start: 2022-08-01 | End: 2022-08-04 | Stop reason: HOSPADM

## 2022-08-01 RX ORDER — ENOXAPARIN SODIUM 100 MG/ML
40 INJECTION SUBCUTANEOUS EVERY 12 HOURS
Status: DISCONTINUED | OUTPATIENT
Start: 2022-08-01 | End: 2022-08-04 | Stop reason: HOSPADM

## 2022-08-01 RX ORDER — DEXTROSE MONOHYDRATE 25 G/50ML
25 INJECTION, SOLUTION INTRAVENOUS PRN
Status: DISCONTINUED | OUTPATIENT
Start: 2022-08-01 | End: 2022-08-04 | Stop reason: HOSPADM

## 2022-08-01 RX ORDER — ATORVASTATIN CALCIUM 40 MG/1
40 TABLET, FILM COATED ORAL EVERY EVENING
Status: DISCONTINUED | OUTPATIENT
Start: 2022-08-01 | End: 2022-08-04 | Stop reason: HOSPADM

## 2022-08-01 RX ORDER — LISINOPRIL 20 MG/1
20 TABLET ORAL DAILY
Status: DISCONTINUED | OUTPATIENT
Start: 2022-08-02 | End: 2022-08-04 | Stop reason: HOSPADM

## 2022-08-01 RX ADMIN — CARVEDILOL 12.5 MG: 12.5 TABLET, FILM COATED ORAL at 20:40

## 2022-08-01 RX ADMIN — CARVEDILOL 12.5 MG: 12.5 TABLET, FILM COATED ORAL at 09:07

## 2022-08-01 RX ADMIN — ACETAMINOPHEN 650 MG: 325 TABLET, FILM COATED ORAL at 11:08

## 2022-08-01 RX ADMIN — ATORVASTATIN CALCIUM 40 MG: 40 TABLET, FILM COATED ORAL at 17:10

## 2022-08-01 RX ADMIN — HYDROCODONE BITARTRATE AND ACETAMINOPHEN 1 TABLET: 5; 325 TABLET ORAL at 20:39

## 2022-08-01 RX ADMIN — AMLODIPINE BESYLATE 5 MG: 5 TABLET ORAL at 09:07

## 2022-08-01 RX ADMIN — ENOXAPARIN SODIUM 40 MG: 40 INJECTION SUBCUTANEOUS at 20:40

## 2022-08-01 RX ADMIN — SODIUM CHLORIDE, PRESERVATIVE FREE 2 ML: 5 INJECTION INTRAVENOUS at 20:40

## 2022-08-01 RX ADMIN — SODIUM CHLORIDE, PRESERVATIVE FREE 2 ML: 5 INJECTION INTRAVENOUS at 09:08

## 2022-08-01 ASSESSMENT — COLUMBIA-SUICIDE SEVERITY RATING SCALE - C-SSRS
6. HAVE YOU EVER DONE ANYTHING, STARTED TO DO ANYTHING, OR PREPARED TO DO ANYTHING TO END YOUR LIFE?: NO
1. WITHIN THE PAST MONTH, HAVE YOU WISHED YOU WERE DEAD OR WISHED YOU COULD GO TO SLEEP AND NOT WAKE UP?: NO
2. HAVE YOU ACTUALLY HAD ANY THOUGHTS OF KILLING YOURSELF?: NO
IS THE PATIENT ABLE TO COMPLETE C-SSRS: YES

## 2022-08-01 ASSESSMENT — ENCOUNTER SYMPTOMS
FATIGUE: 0
NUMBNESS: 0
ABDOMINAL PAIN: 0
CHILLS: 0
VOMITING: 0
PHOTOPHOBIA: 0
ADENOPATHY: 0
DIARRHEA: 0
SINUS PRESSURE: 0
SHORTNESS OF BREATH: 0
HALLUCINATIONS: 0
FEVER: 0
HEADACHES: 0
SORE THROAT: 0
WHEEZING: 0
WEAKNESS: 0
NAUSEA: 0
RHINORRHEA: 0

## 2022-08-01 ASSESSMENT — COGNITIVE AND FUNCTIONAL STATUS - GENERAL
BECAUSE OF A PHYSICAL, MENTAL, OR EMOTIONAL CONDITION, DO YOU HAVE DIFFICULTY DOING ERRANDS ALONE: NO
DO YOU HAVE SERIOUS DIFFICULTY WALKING OR CLIMBING STAIRS: NO
DO YOU HAVE DIFFICULTY DRESSING OR BATHING: NO
BECAUSE OF A PHYSICAL, MENTAL, OR EMOTIONAL CONDITION, DO YOU HAVE SERIOUS DIFFICULTY CONCENTRATING, REMEMBERING OR MAKING DECISIONS: NO
ARE YOU DEAF OR DO YOU HAVE SERIOUS DIFFICULTY  HEARING: NO
ARE YOU BLIND OR DO YOU HAVE SERIOUS DIFFICULTY SEEING, EVEN WHEN WEARING GLASSES: NO

## 2022-08-01 ASSESSMENT — ACTIVITIES OF DAILY LIVING (ADL)
ADL_SCORE: 12
MOBILITY_ASSIST_DEVICES: CANE;STANDARD WALKER
ADL_SHORT_OF_BREATH: NO
CHRONIC_PAIN_PRESENT: NO
RECENT_DECLINE_ADL: NO
ADL_BEFORE_ADMISSION: INDEPENDENT

## 2022-08-01 ASSESSMENT — PATIENT HEALTH QUESTIONNAIRE - PHQ9
CLINICAL INTERPRETATION OF PHQ2 SCORE: NO FURTHER SCREENING NEEDED
2. FEELING DOWN, DEPRESSED OR HOPELESS: NOT AT ALL
SUM OF ALL RESPONSES TO PHQ9 QUESTIONS 1 AND 2: 0
SUM OF ALL RESPONSES TO PHQ9 QUESTIONS 1 AND 2: 0
1. LITTLE INTEREST OR PLEASURE IN DOING THINGS: NOT AT ALL
IS PATIENT ABLE TO COMPLETE PHQ2 OR PHQ9: YES

## 2022-08-01 ASSESSMENT — PAIN SCALES - GENERAL
PAINLEVEL_OUTOF10: 4
PAINLEVEL_OUTOF10: 0
PAINLEVEL_OUTOF10: 1
PAINLEVEL_OUTOF10: 8
PAINLEVEL_OUTOF10: 4
PAINLEVEL_OUTOF10: 5

## 2022-08-01 ASSESSMENT — LIFESTYLE VARIABLES
HOW MANY STANDARD DRINKS CONTAINING ALCOHOL DO YOU HAVE ON A TYPICAL DAY: 0,1 OR 2
HOW OFTEN DO YOU HAVE A DRINK CONTAINING ALCOHOL: NEVER
ALCOHOL_USE_STATUS: NO OR LOW RISK WITH VALIDATED TOOL
HOW OFTEN DO YOU HAVE 6 OR MORE DRINKS ON ONE OCCASION: NEVER
AUDIT-C TOTAL SCORE: 0

## 2022-08-02 ENCOUNTER — APPOINTMENT (OUTPATIENT)
Dept: CT IMAGING | Age: 77
DRG: 189 | End: 2022-08-02
Attending: PSYCHIATRY & NEUROLOGY

## 2022-08-02 ENCOUNTER — APPOINTMENT (OUTPATIENT)
Dept: GENERAL RADIOLOGY | Age: 77
DRG: 189 | End: 2022-08-02
Attending: HOSPITALIST

## 2022-08-02 LAB
AMMONIA PLAS-SCNC: 36 MCMOL/L
APPEARANCE UR: ABNORMAL
BACTERIA #/AREA URNS HPF: ABNORMAL /HPF
BASE EXCESS / DEFICIT, ARTERIAL - RESPIRATORY: 2 MMOL/L (ref -2–3)
BASE EXCESS / DEFICIT, ARTERIAL - RESPIRATORY: 2 MMOL/L (ref -2–3)
BDY SITE: ABNORMAL
BDY SITE: ABNORMAL
BILIRUB UR QL STRIP: NEGATIVE
BODY TEMPERATURE: 36.7 DEGREES
BODY TEMPERATURE: 36.9 DEGREES
COLOR UR: YELLOW
CONDITION: ABNORMAL
CONDITION: ABNORMAL
FIO2 ON VENT: 50 %
FOLATE SERPL-MCNC: 14.1 NG/ML
GLUCOSE BLDC GLUCOMTR-MCNC: 123 MG/DL (ref 70–99)
GLUCOSE BLDC GLUCOMTR-MCNC: 125 MG/DL (ref 70–99)
GLUCOSE BLDC GLUCOMTR-MCNC: 132 MG/DL (ref 70–99)
GLUCOSE BLDC GLUCOMTR-MCNC: 133 MG/DL (ref 70–99)
GLUCOSE BLDC GLUCOMTR-MCNC: 142 MG/DL (ref 70–99)
GLUCOSE UR STRIP-MCNC: >=500 MG/DL
HCO3 BLDA-SCNC: 31 MMOL/L (ref 22–28)
HCO3 BLDA-SCNC: 32 MMOL/L (ref 22–28)
HGB UR QL STRIP: NEGATIVE
HOROWITZ INDEX BLDA+IHG-RTO: 218 MM[HG] (ref 300–500)
HYALINE CASTS #/AREA URNS LPF: ABNORMAL /LPF
KETONES UR STRIP-MCNC: NEGATIVE MG/DL
LEUKOCYTE ESTERASE UR QL STRIP: ABNORMAL
MUCOUS THREADS URNS QL MICRO: PRESENT
NITRITE UR QL STRIP: NEGATIVE
PCO2 BLDA: 69 MM HG (ref 32–45)
PCO2 BLDA: 74 MM HG (ref 32–45)
PH BLDA: 7.24 UNITS (ref 7.35–7.45)
PH BLDA: 7.26 UNITS (ref 7.35–7.45)
PH UR STRIP: 5 [PH] (ref 5–7)
PO2 BLDA: 108 MM HG (ref 83–108)
PO2 BLDA: 74 MM HG (ref 83–108)
PROT UR STRIP-MCNC: NEGATIVE MG/DL
RBC #/AREA URNS HPF: ABNORMAL /HPF
SAO2 % BLDA: 92 % (ref 95–99)
SAO2 % BLDA: 98 % (ref 95–99)
SP GR UR STRIP: 1.02 (ref 1–1.03)
SQUAMOUS #/AREA URNS HPF: ABNORMAL /HPF
THYROPEROXIDASE AB SERPL-ACNC: 29 UNITS/ML
TSH SERPL-ACNC: 2.1 MCUNITS/ML (ref 0.35–5)
UROBILINOGEN UR STRIP-MCNC: 0.2 MG/DL
VIT B12 SERPL-MCNC: 248 PG/ML (ref 211–911)
WBC #/AREA URNS HPF: ABNORMAL /HPF

## 2022-08-02 PROCEDURE — 96372 THER/PROPH/DIAG INJ SC/IM: CPT

## 2022-08-02 PROCEDURE — 81001 URINALYSIS AUTO W/SCOPE: CPT | Performed by: PSYCHIATRY & NEUROLOGY

## 2022-08-02 PROCEDURE — 10004180 HB COUNTER-TRANSPORT

## 2022-08-02 PROCEDURE — 10002803 HB RX 637: Performed by: INTERNAL MEDICINE

## 2022-08-02 PROCEDURE — 97530 THERAPEUTIC ACTIVITIES: CPT

## 2022-08-02 PROCEDURE — 82140 ASSAY OF AMMONIA: CPT | Performed by: PSYCHIATRY & NEUROLOGY

## 2022-08-02 PROCEDURE — 71045 X-RAY EXAM CHEST 1 VIEW: CPT

## 2022-08-02 PROCEDURE — 10002803 HB RX 637: Performed by: HOSPITALIST

## 2022-08-02 PROCEDURE — 10006031 HB ROOM CHARGE TELEMETRY

## 2022-08-02 PROCEDURE — G0378 HOSPITAL OBSERVATION PER HR: HCPCS

## 2022-08-02 PROCEDURE — 82803 BLOOD GASES ANY COMBINATION: CPT

## 2022-08-02 PROCEDURE — 87186 SC STD MICRODIL/AGAR DIL: CPT | Performed by: PSYCHIATRY & NEUROLOGY

## 2022-08-02 PROCEDURE — 10004651 HB RX, NO CHARGE ITEM: Performed by: HOSPITALIST

## 2022-08-02 PROCEDURE — 36415 COLL VENOUS BLD VENIPUNCTURE: CPT | Performed by: PSYCHIATRY & NEUROLOGY

## 2022-08-02 PROCEDURE — 10004281 HB COUNTER-STAFF TIME PER 15 MIN

## 2022-08-02 PROCEDURE — 82962 GLUCOSE BLOOD TEST: CPT

## 2022-08-02 PROCEDURE — 70450 CT HEAD/BRAIN W/O DYE: CPT

## 2022-08-02 PROCEDURE — 94660 CPAP INITIATION&MGMT: CPT

## 2022-08-02 PROCEDURE — 36600 WITHDRAWAL OF ARTERIAL BLOOD: CPT

## 2022-08-02 PROCEDURE — 10004651 HB RX, NO CHARGE ITEM: Performed by: INTERNAL MEDICINE

## 2022-08-02 PROCEDURE — 10002800 HB RX 250 W HCPCS: Performed by: HOSPITALIST

## 2022-08-02 PROCEDURE — 13003289 HB OXYGEN THERAPY DAILY

## 2022-08-02 PROCEDURE — G1004 CDSM NDSC: HCPCS

## 2022-08-02 PROCEDURE — 97162 PT EVAL MOD COMPLEX 30 MIN: CPT

## 2022-08-02 RX ORDER — CEFAZOLIN SODIUM/WATER 2 G/20 ML
2000 SYRINGE (ML) INTRAVENOUS EVERY 24 HOURS
Status: DISCONTINUED | OUTPATIENT
Start: 2022-08-02 | End: 2022-08-04 | Stop reason: HOSPADM

## 2022-08-02 RX ADMIN — FUROSEMIDE 40 MG: 20 TABLET ORAL at 08:07

## 2022-08-02 RX ADMIN — AMLODIPINE BESYLATE 5 MG: 5 TABLET ORAL at 08:07

## 2022-08-02 RX ADMIN — SODIUM CHLORIDE, PRESERVATIVE FREE 2 ML: 5 INJECTION INTRAVENOUS at 20:05

## 2022-08-02 RX ADMIN — ASPIRIN 81 MG: 81 TABLET, COATED ORAL at 08:07

## 2022-08-02 RX ADMIN — ACETAMINOPHEN 650 MG: 325 TABLET, FILM COATED ORAL at 13:45

## 2022-08-02 RX ADMIN — CEFTRIAXONE SODIUM 2000 MG: 10 INJECTION, POWDER, FOR SOLUTION INTRAVENOUS at 22:51

## 2022-08-02 RX ADMIN — ENOXAPARIN SODIUM 40 MG: 40 INJECTION SUBCUTANEOUS at 08:08

## 2022-08-02 RX ADMIN — ENOXAPARIN SODIUM 40 MG: 40 INJECTION SUBCUTANEOUS at 20:07

## 2022-08-02 RX ADMIN — CARVEDILOL 12.5 MG: 12.5 TABLET, FILM COATED ORAL at 08:07

## 2022-08-02 RX ADMIN — LISINOPRIL 20 MG: 20 TABLET ORAL at 08:07

## 2022-08-02 RX ADMIN — CARVEDILOL 12.5 MG: 12.5 TABLET, FILM COATED ORAL at 20:08

## 2022-08-02 RX ADMIN — ATORVASTATIN CALCIUM 40 MG: 40 TABLET, FILM COATED ORAL at 17:58

## 2022-08-02 RX ADMIN — ACETAMINOPHEN 650 MG: 325 TABLET, FILM COATED ORAL at 22:59

## 2022-08-02 ASSESSMENT — PAIN SCALES - GENERAL
PAINLEVEL_OUTOF10: 4
PAINLEVEL_OUTOF10: 0
PAINLEVEL_OUTOF10: 0
PAINLEVEL_OUTOF10: 1
PAINLEVEL_OUTOF10: 0
PAINLEVEL_OUTOF10: 0
PAINLEVEL_OUTOF10: 3

## 2022-08-02 ASSESSMENT — ACTIVITIES OF DAILY LIVING (ADL): PRIOR_ADL: INDEPENDENT

## 2022-08-02 ASSESSMENT — COGNITIVE AND FUNCTIONAL STATUS - GENERAL
BASIC_MOBILITY_CONVERTED_SCORE: 22.61
BASIC_MOBILITY_RAW_SCORE: 8

## 2022-08-03 ENCOUNTER — APPOINTMENT (OUTPATIENT)
Dept: CARDIOLOGY | Age: 77
DRG: 189 | End: 2022-08-03
Attending: INTERNAL MEDICINE

## 2022-08-03 LAB
AORTIC VALVE AREA: 2.06
ASCENDING AORTA (AAD): 3.4
AV MEAN GRADIENT (AVMG): 9
AV MEAN VELOCITY (AVMV): 1.31
AV PEAK GRADIENT (AVPG): 18
AV PEAK VELOCITY (AVPV): 2.19
AV STENOSIS SEVERITY TEXT: NORMAL
BASOPHILS # BLD: 0 K/MCL (ref 0–0.3)
BASOPHILS NFR BLD: 0 %
DEPRECATED RDW RBC: 58.4 FL (ref 39–50)
DOP CALC LVOT PEAK VEL (LVOTPV): 1.24
E WAVE DECELARATION TIME (MDT): 383
EOSINOPHIL # BLD: 0.1 K/MCL (ref 0–0.5)
EOSINOPHIL NFR BLD: 1 %
ERYTHROCYTE [DISTWIDTH] IN BLOOD: 16.2 % (ref 11–15)
EST RIGHT VENT SYSTOLIC PRESSURE BY TRICUSPID REGURGITATION JET (RVSP): 74
GLUCOSE BLDC GLUCOMTR-MCNC: 103 MG/DL (ref 70–99)
GLUCOSE BLDC GLUCOMTR-MCNC: 142 MG/DL (ref 70–99)
GLUCOSE BLDC GLUCOMTR-MCNC: 143 MG/DL (ref 70–99)
GLUCOSE BLDC GLUCOMTR-MCNC: 146 MG/DL (ref 70–99)
GLUCOSE BLDC GLUCOMTR-MCNC: 95 MG/DL (ref 70–99)
HCT VFR BLD CALC: 43.4 % (ref 36–46.5)
HGB BLD-MCNC: 13.4 G/DL (ref 12–15.5)
IMM GRANULOCYTES # BLD AUTO: 0 K/MCL (ref 0–0.2)
IMM GRANULOCYTES # BLD: 0 %
LEFT INTERNAL DIMENSION IN SYSTOLE (LVSD): 2.6
LEFT VENTRICULAR INTERNAL DIMENSION IN DIASTOLE (LVDD): 4.1
LV EF: NORMAL %
LVOT 2D (LVOTD): 2
LVOT VTI (LVOTVTI): 26.3
LYMPHOCYTES # BLD: 0.9 K/MCL (ref 1–4)
LYMPHOCYTES NFR BLD: 11 %
MCH RBC QN AUTO: 29.9 PG (ref 26–34)
MCHC RBC AUTO-ENTMCNC: 30.9 G/DL (ref 32–36.5)
MCV RBC AUTO: 96.9 FL (ref 78–100)
MONOCYTES # BLD: 0.8 K/MCL (ref 0.3–0.9)
MONOCYTES NFR BLD: 10 %
NEUTROPHILS # BLD: 6.2 K/MCL (ref 1.8–7.7)
NEUTROPHILS NFR BLD: 78 %
NRBC BLD MANUAL-RTO: 0 /100 WBC
PLATELET # BLD AUTO: 179 K/MCL (ref 140–450)
RBC # BLD: 4.48 MIL/MCL (ref 4–5.2)
TRICUSPID ANNULAR PLANE SYSTOLIC EXCURSION (TAPSE): 2.67
WBC # BLD: 8.1 K/MCL (ref 4.2–11)

## 2022-08-03 PROCEDURE — 93306 TTE W/DOPPLER COMPLETE: CPT | Performed by: INTERNAL MEDICINE

## 2022-08-03 PROCEDURE — 10002800 HB RX 250 W HCPCS: Performed by: HOSPITALIST

## 2022-08-03 PROCEDURE — 10004651 HB RX, NO CHARGE ITEM: Performed by: INTERNAL MEDICINE

## 2022-08-03 PROCEDURE — 97166 OT EVAL MOD COMPLEX 45 MIN: CPT

## 2022-08-03 PROCEDURE — 10006031 HB ROOM CHARGE TELEMETRY

## 2022-08-03 PROCEDURE — 10004180 HB COUNTER-TRANSPORT

## 2022-08-03 PROCEDURE — 10002803 HB RX 637: Performed by: HOSPITALIST

## 2022-08-03 PROCEDURE — 97530 THERAPEUTIC ACTIVITIES: CPT

## 2022-08-03 PROCEDURE — 93306 TTE W/DOPPLER COMPLETE: CPT

## 2022-08-03 PROCEDURE — 10004651 HB RX, NO CHARGE ITEM: Performed by: HOSPITALIST

## 2022-08-03 PROCEDURE — 85025 COMPLETE CBC W/AUTO DIFF WBC: CPT | Performed by: INTERNAL MEDICINE

## 2022-08-03 PROCEDURE — 10004281 HB COUNTER-STAFF TIME PER 15 MIN

## 2022-08-03 PROCEDURE — 10002805 HB CONTRAST AGENT: Performed by: INTERNAL MEDICINE

## 2022-08-03 PROCEDURE — 94660 CPAP INITIATION&MGMT: CPT

## 2022-08-03 PROCEDURE — 36415 COLL VENOUS BLD VENIPUNCTURE: CPT | Performed by: INTERNAL MEDICINE

## 2022-08-03 RX ADMIN — ACETAMINOPHEN 650 MG: 325 TABLET, FILM COATED ORAL at 09:02

## 2022-08-03 RX ADMIN — ENOXAPARIN SODIUM 40 MG: 40 INJECTION SUBCUTANEOUS at 10:30

## 2022-08-03 RX ADMIN — SODIUM CHLORIDE, PRESERVATIVE FREE 2 ML: 5 INJECTION INTRAVENOUS at 21:07

## 2022-08-03 RX ADMIN — SODIUM CHLORIDE, PRESERVATIVE FREE 2 ML: 5 INJECTION INTRAVENOUS at 09:04

## 2022-08-03 RX ADMIN — CEFTRIAXONE SODIUM 2000 MG: 10 INJECTION, POWDER, FOR SOLUTION INTRAVENOUS at 21:07

## 2022-08-03 RX ADMIN — ASPIRIN 81 MG: 81 TABLET, COATED ORAL at 09:02

## 2022-08-03 RX ADMIN — ACETAMINOPHEN 650 MG: 325 TABLET, FILM COATED ORAL at 17:42

## 2022-08-03 RX ADMIN — ATORVASTATIN CALCIUM 40 MG: 40 TABLET, FILM COATED ORAL at 17:42

## 2022-08-03 RX ADMIN — FUROSEMIDE 40 MG: 20 TABLET ORAL at 09:02

## 2022-08-03 RX ADMIN — PERFLUTREN 2 ML: 6.52 INJECTION, SUSPENSION INTRAVENOUS at 14:00

## 2022-08-03 RX ADMIN — ENOXAPARIN SODIUM 40 MG: 40 INJECTION SUBCUTANEOUS at 21:07

## 2022-08-03 ASSESSMENT — COGNITIVE AND FUNCTIONAL STATUS - GENERAL
DAILY_ACTIVITY_RAW_SCORE: 14
HELP NEEDED FOR BATHING: A LOT
HELP NEEDED DRESSING REGULAR UPPER BODY CLOTHING: A LITTLE
HELP NEEDED FOR PERSONAL GROOMING: A LITTLE
DAILY_ACTIVITY_CONVERTED_SCORE: 33.39
HELP NEEDED FOR TOILETING: TOTAL
HELP NEEDED DRESSING REGULAR LOWER BODY CLOTHING: TOTAL

## 2022-08-03 ASSESSMENT — PAIN SCALES - GENERAL
PAINLEVEL_OUTOF10: 0
PAINLEVEL_OUTOF10: 0

## 2022-08-03 ASSESSMENT — ENCOUNTER SYMPTOMS: PAIN SEVERITY NOW: 0

## 2022-08-04 VITALS
OXYGEN SATURATION: 95 % | HEIGHT: 68 IN | BODY MASS INDEX: 44.41 KG/M2 | RESPIRATION RATE: 14 BRPM | HEART RATE: 82 BPM | SYSTOLIC BLOOD PRESSURE: 138 MMHG | TEMPERATURE: 98.4 F | DIASTOLIC BLOOD PRESSURE: 75 MMHG | WEIGHT: 293 LBS

## 2022-08-04 LAB
ANION GAP SERPL CALC-SCNC: 7 MMOL/L (ref 7–19)
BACTERIA UR CULT: ABNORMAL
BUN SERPL-MCNC: 45 MG/DL (ref 6–20)
BUN/CREAT SERPL: 37 (ref 7–25)
CALCIUM SERPL-MCNC: 9.2 MG/DL (ref 8.4–10.2)
CHLORIDE SERPL-SCNC: 110 MMOL/L (ref 97–110)
CO2 SERPL-SCNC: 30 MMOL/L (ref 21–32)
CREAT SERPL-MCNC: 1.21 MG/DL (ref 0.51–0.95)
FASTING DURATION TIME PATIENT: ABNORMAL H
GFR SERPLBLD BASED ON 1.73 SQ M-ARVRAT: 46 ML/MIN
GLUCOSE BLDC GLUCOMTR-MCNC: 101 MG/DL (ref 70–99)
GLUCOSE BLDC GLUCOMTR-MCNC: 171 MG/DL (ref 70–99)
GLUCOSE SERPL-MCNC: 127 MG/DL (ref 70–99)
POTASSIUM SERPL-SCNC: 4.8 MMOL/L (ref 3.4–5.1)
RAINBOW EXTRA TUBES HOLD SPECIMEN: NORMAL
SODIUM SERPL-SCNC: 142 MMOL/L (ref 135–145)

## 2022-08-04 PROCEDURE — 10002803 HB RX 637: Performed by: HOSPITALIST

## 2022-08-04 PROCEDURE — 80048 BASIC METABOLIC PNL TOTAL CA: CPT | Performed by: HOSPITALIST

## 2022-08-04 PROCEDURE — 10004651 HB RX, NO CHARGE ITEM: Performed by: INTERNAL MEDICINE

## 2022-08-04 PROCEDURE — 10002800 HB RX 250 W HCPCS: Performed by: HOSPITALIST

## 2022-08-04 PROCEDURE — 10004651 HB RX, NO CHARGE ITEM: Performed by: HOSPITALIST

## 2022-08-04 PROCEDURE — 36415 COLL VENOUS BLD VENIPUNCTURE: CPT | Performed by: HOSPITALIST

## 2022-08-04 PROCEDURE — 13003289 HB OXYGEN THERAPY DAILY

## 2022-08-04 RX ORDER — NYSTATIN 100000 [USP'U]/G
POWDER TOPICAL EVERY 12 HOURS SCHEDULED
Qty: 30 G | Refills: 0 | Status: SHIPPED
Start: 2022-08-04 | End: 2022-08-04 | Stop reason: SDUPTHER

## 2022-08-04 RX ORDER — CEPHALEXIN 500 MG/1
500 CAPSULE ORAL 4 TIMES DAILY
Qty: 12 CAPSULE | Refills: 0 | Status: SHIPPED | OUTPATIENT
Start: 2022-08-04 | End: 2022-08-07

## 2022-08-04 RX ORDER — CEPHALEXIN 250 MG/1
250 CAPSULE ORAL 4 TIMES DAILY
Qty: 14 CAPSULE | Refills: 0 | Status: SHIPPED
Start: 2022-08-04 | End: 2022-08-04 | Stop reason: SDUPTHER

## 2022-08-04 RX ORDER — NYSTATIN 100000 [USP'U]/G
POWDER TOPICAL EVERY 12 HOURS SCHEDULED
Qty: 30 G | Refills: 0 | Status: SHIPPED
Start: 2022-08-04

## 2022-08-04 RX ORDER — NYSTATIN 100000 [USP'U]/G
POWDER TOPICAL EVERY 12 HOURS SCHEDULED
Status: DISCONTINUED | OUTPATIENT
Start: 2022-08-04 | End: 2022-08-04 | Stop reason: HOSPADM

## 2022-08-04 RX ADMIN — SODIUM CHLORIDE, PRESERVATIVE FREE 2 ML: 5 INJECTION INTRAVENOUS at 08:19

## 2022-08-04 RX ADMIN — ASPIRIN 81 MG: 81 TABLET, COATED ORAL at 08:18

## 2022-08-04 RX ADMIN — FUROSEMIDE 40 MG: 20 TABLET ORAL at 08:18

## 2022-08-04 RX ADMIN — INSULIN LISPRO 2 UNITS: 100 INJECTION, SOLUTION INTRAVENOUS; SUBCUTANEOUS at 12:33

## 2022-08-04 RX ADMIN — ENOXAPARIN SODIUM 40 MG: 40 INJECTION SUBCUTANEOUS at 08:18

## 2022-08-04 ASSESSMENT — PAIN SCALES - GENERAL: PAINLEVEL_OUTOF10: 0

## 2022-08-20 ENCOUNTER — APPOINTMENT (OUTPATIENT)
Dept: ULTRASOUND IMAGING | Age: 77
DRG: 314 | End: 2022-08-20
Attending: EMERGENCY MEDICINE

## 2022-08-20 ENCOUNTER — APPOINTMENT (OUTPATIENT)
Dept: GENERAL RADIOLOGY | Age: 77
DRG: 314 | End: 2022-08-20
Attending: EMERGENCY MEDICINE

## 2022-08-20 ENCOUNTER — HOSPITAL ENCOUNTER (INPATIENT)
Age: 77
LOS: 2 days | Discharge: NURSING, REHAB OR LONG-TERM CARE | DRG: 314 | End: 2022-08-24
Attending: EMERGENCY MEDICINE | Admitting: HOSPITALIST

## 2022-08-20 DIAGNOSIS — N17.9 ACUTE KIDNEY INJURY (CMD): ICD-10-CM

## 2022-08-20 DIAGNOSIS — I95.89 HYPOTENSION DUE TO HYPOVOLEMIA: Primary | ICD-10-CM

## 2022-08-20 DIAGNOSIS — R09.02 HYPOXIA: ICD-10-CM

## 2022-08-20 DIAGNOSIS — E86.1 HYPOTENSION DUE TO HYPOVOLEMIA: Primary | ICD-10-CM

## 2022-08-20 DIAGNOSIS — E86.0 DEHYDRATION: ICD-10-CM

## 2022-08-20 LAB
ALBUMIN SERPL-MCNC: 3.1 G/DL (ref 3.6–5.1)
ALBUMIN/GLOB SERPL: 0.8 {RATIO} (ref 1–2.4)
ALP SERPL-CCNC: 73 UNITS/L (ref 45–117)
ALT SERPL-CCNC: 22 UNITS/L
ANION GAP SERPL CALC-SCNC: 11 MMOL/L (ref 7–19)
APPEARANCE UR: CLEAR
AST SERPL-CCNC: 23 UNITS/L
BASOPHILS # BLD: 0 K/MCL (ref 0–0.3)
BASOPHILS NFR BLD: 0 %
BILIRUB SERPL-MCNC: 1 MG/DL (ref 0.2–1)
BILIRUB UR QL STRIP: NEGATIVE
BUN SERPL-MCNC: 54 MG/DL (ref 6–20)
BUN/CREAT SERPL: 25 (ref 7–25)
CALCIUM SERPL-MCNC: 9.8 MG/DL (ref 8.4–10.2)
CHLORIDE SERPL-SCNC: 99 MMOL/L (ref 97–110)
CO2 SERPL-SCNC: 31 MMOL/L (ref 21–32)
COLOR UR: YELLOW
CREAT SERPL-MCNC: 2.19 MG/DL (ref 0.51–0.95)
D DIMER PPP FEU-MCNC: 4.6 MG/L (FEU)
DEPRECATED RDW RBC: 56.2 FL (ref 39–50)
EOSINOPHIL # BLD: 0 K/MCL (ref 0–0.5)
EOSINOPHIL NFR BLD: 0 %
ERYTHROCYTE [DISTWIDTH] IN BLOOD: 16 % (ref 11–15)
FASTING DURATION TIME PATIENT: ABNORMAL H
FLUAV RNA RESP QL NAA+PROBE: NOT DETECTED
FLUBV RNA RESP QL NAA+PROBE: NOT DETECTED
GFR SERPLBLD BASED ON 1.73 SQ M-ARVRAT: 23 ML/MIN
GLOBULIN SER-MCNC: 4 G/DL (ref 2–4)
GLUCOSE SERPL-MCNC: 112 MG/DL (ref 70–99)
GLUCOSE UR STRIP-MCNC: 100 MG/DL
HCT VFR BLD CALC: 43.2 % (ref 36–46.5)
HGB BLD-MCNC: 13.2 G/DL (ref 12–15.5)
HGB UR QL STRIP: ABNORMAL
IMM GRANULOCYTES # BLD AUTO: 0.1 K/MCL (ref 0–0.2)
IMM GRANULOCYTES # BLD: 1 %
KETONES UR STRIP-MCNC: NEGATIVE MG/DL
LACTATE BLDV-SCNC: 1.7 MMOL/L (ref 0–2)
LACTATE BLDV-SCNC: 2.2 MMOL/L (ref 0–2)
LEUKOCYTE ESTERASE UR QL STRIP: ABNORMAL
LYMPHOCYTES # BLD: 0.9 K/MCL (ref 1–4)
LYMPHOCYTES NFR BLD: 9 %
MCH RBC QN AUTO: 29 PG (ref 26–34)
MCHC RBC AUTO-ENTMCNC: 30.6 G/DL (ref 32–36.5)
MCV RBC AUTO: 94.9 FL (ref 78–100)
MONOCYTES # BLD: 1 K/MCL (ref 0.3–0.9)
MONOCYTES NFR BLD: 9 %
NEUTROPHILS # BLD: 8.5 K/MCL (ref 1.8–7.7)
NEUTROPHILS NFR BLD: 81 %
NITRITE UR QL STRIP: NEGATIVE
NRBC BLD MANUAL-RTO: 0 /100 WBC
PH UR STRIP: 5 UNITS (ref 5–7)
PLATELET # BLD AUTO: 176 K/MCL (ref 140–450)
POTASSIUM SERPL-SCNC: 5.1 MMOL/L (ref 3.4–5.1)
PROT SERPL-MCNC: 7.1 G/DL (ref 6.4–8.2)
PROT UR STRIP-MCNC: NEGATIVE MG/DL
RAINBOW EXTRA TUBES HOLD SPECIMEN: NORMAL
RBC # BLD: 4.55 MIL/MCL (ref 4–5.2)
RSV AG NPH QL IA.RAPID: NOT DETECTED
SARS-COV-2 RNA RESP QL NAA+PROBE: NOT DETECTED
SERVICE CMNT-IMP: NORMAL
SERVICE CMNT-IMP: NORMAL
SODIUM SERPL-SCNC: 136 MMOL/L (ref 135–145)
SP GR UR STRIP: >1.03 (ref 1–1.03)
UROBILINOGEN UR STRIP-MCNC: 0.2 MG/DL
WBC # BLD: 10.5 K/MCL (ref 4.2–11)

## 2022-08-20 PROCEDURE — 94660 CPAP INITIATION&MGMT: CPT

## 2022-08-20 PROCEDURE — 83605 ASSAY OF LACTIC ACID: CPT | Performed by: EMERGENCY MEDICINE

## 2022-08-20 PROCEDURE — 71045 X-RAY EXAM CHEST 1 VIEW: CPT

## 2022-08-20 PROCEDURE — 81003 URINALYSIS AUTO W/O SCOPE: CPT

## 2022-08-20 PROCEDURE — 96372 THER/PROPH/DIAG INJ SC/IM: CPT

## 2022-08-20 PROCEDURE — 10002807 HB RX 258: Performed by: EMERGENCY MEDICINE

## 2022-08-20 PROCEDURE — 93005 ELECTROCARDIOGRAM TRACING: CPT | Performed by: EMERGENCY MEDICINE

## 2022-08-20 PROCEDURE — G0378 HOSPITAL OBSERVATION PER HR: HCPCS

## 2022-08-20 PROCEDURE — 96361 HYDRATE IV INFUSION ADD-ON: CPT

## 2022-08-20 PROCEDURE — 10004281 HB COUNTER-STAFF TIME PER 15 MIN

## 2022-08-20 PROCEDURE — 85379 FIBRIN DEGRADATION QUANT: CPT | Performed by: EMERGENCY MEDICINE

## 2022-08-20 PROCEDURE — 10002800 HB RX 250 W HCPCS: Performed by: HOSPITALIST

## 2022-08-20 PROCEDURE — 0241U COVID/FLU/RSV PANEL: CPT | Performed by: EMERGENCY MEDICINE

## 2022-08-20 PROCEDURE — 93970 EXTREMITY STUDY: CPT

## 2022-08-20 PROCEDURE — C9803 HOPD COVID-19 SPEC COLLECT: HCPCS

## 2022-08-20 PROCEDURE — 10002807 HB RX 258: Performed by: HOSPITALIST

## 2022-08-20 PROCEDURE — 80053 COMPREHEN METABOLIC PANEL: CPT | Performed by: EMERGENCY MEDICINE

## 2022-08-20 PROCEDURE — 85025 COMPLETE CBC W/AUTO DIFF WBC: CPT | Performed by: EMERGENCY MEDICINE

## 2022-08-20 PROCEDURE — 96360 HYDRATION IV INFUSION INIT: CPT

## 2022-08-20 PROCEDURE — 99285 EMERGENCY DEPT VISIT HI MDM: CPT

## 2022-08-20 RX ORDER — ATORVASTATIN CALCIUM 40 MG/1
40 TABLET, FILM COATED ORAL EVERY EVENING
Status: DISCONTINUED | OUTPATIENT
Start: 2022-08-20 | End: 2022-08-24 | Stop reason: HOSPADM

## 2022-08-20 RX ORDER — NICOTINE POLACRILEX 4 MG
15 LOZENGE BUCCAL PRN
Status: DISCONTINUED | OUTPATIENT
Start: 2022-08-20 | End: 2022-08-24 | Stop reason: HOSPADM

## 2022-08-20 RX ORDER — SODIUM CHLORIDE 9 MG/ML
INJECTION, SOLUTION INTRAVENOUS CONTINUOUS
Status: DISCONTINUED | OUTPATIENT
Start: 2022-08-20 | End: 2022-08-21

## 2022-08-20 RX ORDER — ONDANSETRON 2 MG/ML
4 INJECTION INTRAMUSCULAR; INTRAVENOUS EVERY 6 HOURS PRN
Status: DISCONTINUED | OUTPATIENT
Start: 2022-08-20 | End: 2022-08-24 | Stop reason: HOSPADM

## 2022-08-20 RX ORDER — 0.9 % SODIUM CHLORIDE 0.9 %
2 VIAL (ML) INJECTION EVERY 12 HOURS SCHEDULED
Status: DISCONTINUED | OUTPATIENT
Start: 2022-08-20 | End: 2022-08-24 | Stop reason: HOSPADM

## 2022-08-20 RX ORDER — POLYETHYLENE GLYCOL 3350 17 G/17G
17 POWDER, FOR SOLUTION ORAL DAILY PRN
Status: DISCONTINUED | OUTPATIENT
Start: 2022-08-20 | End: 2022-08-24 | Stop reason: HOSPADM

## 2022-08-20 RX ORDER — DEXTROSE MONOHYDRATE 25 G/50ML
25 INJECTION, SOLUTION INTRAVENOUS PRN
Status: DISCONTINUED | OUTPATIENT
Start: 2022-08-20 | End: 2022-08-24 | Stop reason: HOSPADM

## 2022-08-20 RX ORDER — ACETAMINOPHEN 325 MG/1
650 TABLET ORAL EVERY 4 HOURS PRN
Status: DISCONTINUED | OUTPATIENT
Start: 2022-08-20 | End: 2022-08-24 | Stop reason: HOSPADM

## 2022-08-20 RX ORDER — IPRATROPIUM BROMIDE AND ALBUTEROL SULFATE 2.5; .5 MG/3ML; MG/3ML
3 SOLUTION RESPIRATORY (INHALATION)
Status: DISCONTINUED | OUTPATIENT
Start: 2022-08-20 | End: 2022-08-24 | Stop reason: HOSPADM

## 2022-08-20 RX ORDER — NICOTINE POLACRILEX 4 MG
30 LOZENGE BUCCAL PRN
Status: DISCONTINUED | OUTPATIENT
Start: 2022-08-20 | End: 2022-08-24 | Stop reason: HOSPADM

## 2022-08-20 RX ORDER — EXEMESTANE 25 MG/1
25 TABLET ORAL DAILY
Status: DISCONTINUED | OUTPATIENT
Start: 2022-08-21 | End: 2022-08-24 | Stop reason: HOSPADM

## 2022-08-20 RX ORDER — HEPARIN SODIUM 5000 [USP'U]/ML
5000 INJECTION, SOLUTION INTRAVENOUS; SUBCUTANEOUS EVERY 8 HOURS SCHEDULED
Status: DISCONTINUED | OUTPATIENT
Start: 2022-08-20 | End: 2022-08-24 | Stop reason: HOSPADM

## 2022-08-20 RX ORDER — NYSTATIN 100000 [USP'U]/G
POWDER TOPICAL EVERY 12 HOURS SCHEDULED
Status: DISCONTINUED | OUTPATIENT
Start: 2022-08-21 | End: 2022-08-24 | Stop reason: HOSPADM

## 2022-08-20 RX ORDER — CARVEDILOL 12.5 MG/1
12.5 TABLET ORAL 2 TIMES DAILY WITH MEALS
Status: DISCONTINUED | OUTPATIENT
Start: 2022-08-21 | End: 2022-08-21

## 2022-08-20 RX ORDER — DEXTROSE MONOHYDRATE 25 G/50ML
12.5 INJECTION, SOLUTION INTRAVENOUS PRN
Status: DISCONTINUED | OUTPATIENT
Start: 2022-08-20 | End: 2022-08-24 | Stop reason: HOSPADM

## 2022-08-20 RX ADMIN — HEPARIN SODIUM 5000 UNITS: 5000 INJECTION INTRAVENOUS; SUBCUTANEOUS at 21:32

## 2022-08-20 RX ADMIN — SODIUM CHLORIDE 500 ML: 9 INJECTION, SOLUTION INTRAVENOUS at 16:37

## 2022-08-20 RX ADMIN — SODIUM CHLORIDE: 9 INJECTION, SOLUTION INTRAVENOUS at 21:31

## 2022-08-20 ASSESSMENT — COLUMBIA-SUICIDE SEVERITY RATING SCALE - C-SSRS
1. WITHIN THE PAST MONTH, HAVE YOU WISHED YOU WERE DEAD OR WISHED YOU COULD GO TO SLEEP AND NOT WAKE UP?: NO
2. HAVE YOU ACTUALLY HAD ANY THOUGHTS OF KILLING YOURSELF?: NO
IS THE PATIENT ABLE TO COMPLETE C-SSRS: YES

## 2022-08-20 ASSESSMENT — ACTIVITIES OF DAILY LIVING (ADL)
CHRONIC_PAIN_PRESENT: NO
DRESSING YOURSELF: NEEDS ASSISTANCE
FEEDING YOURSELF: NEEDS ASSISTANCE
TRANSFERRING: NEEDS ASSISTANCE
RECENT_DECLINE_ADL: NO
TOILETING: NEEDS ASSISTANCE
BATHING: NEEDS ASSISTANCE
ADL_BEFORE_ADMISSION: NEEDS/REQUIRES ASSISTANCE
ADL_SCORE: 6
CONTINENCE: NEEDS ASSISTANCE
ADL_SHORT_OF_BREATH: NO

## 2022-08-20 ASSESSMENT — COGNITIVE AND FUNCTIONAL STATUS - GENERAL
BECAUSE OF A PHYSICAL, MENTAL, OR EMOTIONAL CONDITION, DO YOU HAVE DIFFICULTY DOING ERRANDS ALONE: NO
BECAUSE OF A PHYSICAL, MENTAL, OR EMOTIONAL CONDITION, DO YOU HAVE SERIOUS DIFFICULTY CONCENTRATING, REMEMBERING OR MAKING DECISIONS: NO
ARE YOU DEAF OR DO YOU HAVE SERIOUS DIFFICULTY  HEARING: NO
DO YOU HAVE DIFFICULTY DRESSING OR BATHING: NO
DO YOU HAVE SERIOUS DIFFICULTY WALKING OR CLIMBING STAIRS: YES
ARE YOU BLIND OR DO YOU HAVE SERIOUS DIFFICULTY SEEING, EVEN WHEN WEARING GLASSES: NO

## 2022-08-20 ASSESSMENT — PATIENT HEALTH QUESTIONNAIRE - PHQ9
SUM OF ALL RESPONSES TO PHQ9 QUESTIONS 1 AND 2: 0
1. LITTLE INTEREST OR PLEASURE IN DOING THINGS: NOT AT ALL
SUM OF ALL RESPONSES TO PHQ9 QUESTIONS 1 AND 2: 0
2. FEELING DOWN, DEPRESSED OR HOPELESS: NOT AT ALL
IS PATIENT ABLE TO COMPLETE PHQ2 OR PHQ9: YES
CLINICAL INTERPRETATION OF PHQ2 SCORE: NO FURTHER SCREENING NEEDED

## 2022-08-20 ASSESSMENT — LIFESTYLE VARIABLES
AUDIT-C TOTAL SCORE: 0
HOW MANY STANDARD DRINKS CONTAINING ALCOHOL DO YOU HAVE ON A TYPICAL DAY: 0,1 OR 2
ALCOHOL_USE_STATUS: NO OR LOW RISK WITH VALIDATED TOOL
HOW OFTEN DO YOU HAVE A DRINK CONTAINING ALCOHOL: NEVER
HOW OFTEN DO YOU HAVE 6 OR MORE DRINKS ON ONE OCCASION: NEVER

## 2022-08-20 ASSESSMENT — PAIN SCALES - GENERAL: PAINLEVEL_OUTOF10: 2

## 2022-08-21 LAB
ANION GAP SERPL CALC-SCNC: 8 MMOL/L (ref 7–19)
APPEARANCE UR: ABNORMAL
ATRIAL RATE (BPM): 83
BACTERIA #/AREA URNS HPF: ABNORMAL /HPF
BILIRUB UR QL STRIP: NEGATIVE
BUN SERPL-MCNC: 46 MG/DL (ref 6–20)
BUN/CREAT SERPL: 31 (ref 7–25)
CALCIUM SERPL-MCNC: 9.2 MG/DL (ref 8.4–10.2)
CHLORIDE SERPL-SCNC: 101 MMOL/L (ref 97–110)
CO2 SERPL-SCNC: 34 MMOL/L (ref 21–32)
COLOR UR: YELLOW
CREAT SERPL-MCNC: 1.5 MG/DL (ref 0.51–0.95)
DEPRECATED RDW RBC: 56.3 FL (ref 39–50)
ERYTHROCYTE [DISTWIDTH] IN BLOOD: 15.9 % (ref 11–15)
FASTING DURATION TIME PATIENT: ABNORMAL H
GFR SERPLBLD BASED ON 1.73 SQ M-ARVRAT: 36 ML/MIN
GLUCOSE BLDC GLUCOMTR-MCNC: 109 MG/DL (ref 70–99)
GLUCOSE BLDC GLUCOMTR-MCNC: 116 MG/DL (ref 70–99)
GLUCOSE BLDC GLUCOMTR-MCNC: 85 MG/DL (ref 70–99)
GLUCOSE SERPL-MCNC: 90 MG/DL (ref 70–99)
GLUCOSE UR STRIP-MCNC: 150 MG/DL
HCT VFR BLD CALC: 40.1 % (ref 36–46.5)
HGB BLD-MCNC: 12.4 G/DL (ref 12–15.5)
HGB UR QL STRIP: NEGATIVE
HYALINE CASTS #/AREA URNS LPF: ABNORMAL /LPF
KETONES UR STRIP-MCNC: ABNORMAL MG/DL
LEUKOCYTE ESTERASE UR QL STRIP: ABNORMAL
MAGNESIUM SERPL-MCNC: 1.6 MG/DL (ref 1.7–2.4)
MCH RBC QN AUTO: 29.7 PG (ref 26–34)
MCHC RBC AUTO-ENTMCNC: 30.9 G/DL (ref 32–36.5)
MCV RBC AUTO: 95.9 FL (ref 78–100)
MUCOUS THREADS URNS QL MICRO: PRESENT
NITRITE UR QL STRIP: NEGATIVE
NRBC BLD MANUAL-RTO: 0 /100 WBC
P AXIS (DEGREES): 54
PH UR STRIP: 5 [PH] (ref 5–7)
PLATELET # BLD AUTO: 145 K/MCL (ref 140–450)
POTASSIUM SERPL-SCNC: 4.2 MMOL/L (ref 3.4–5.1)
PR-INTERVAL (MSEC): 148
PROT UR STRIP-MCNC: NEGATIVE MG/DL
QRS-INTERVAL (MSEC): 80
QT-INTERVAL (MSEC): 366
QTC: 430
R AXIS (DEGREES): 20
RBC # BLD: 4.18 MIL/MCL (ref 4–5.2)
RBC #/AREA URNS HPF: ABNORMAL /HPF
REPORT TEXT: NORMAL
SODIUM SERPL-SCNC: 139 MMOL/L (ref 135–145)
SP GR UR STRIP: 1.01 (ref 1–1.03)
SQUAMOUS #/AREA URNS HPF: ABNORMAL /HPF
T AXIS (DEGREES): 20
TRANS CELLS #/AREA URNS HPF: ABNORMAL /HPF
UROBILINOGEN UR STRIP-MCNC: 0.2 MG/DL
VENTRICULAR RATE EKG/MIN (BPM): 83
WBC # BLD: 6.6 K/MCL (ref 4.2–11)
WBC #/AREA URNS HPF: ABNORMAL /HPF
YEAST URNS QL MICRO: PRESENT

## 2022-08-21 PROCEDURE — 10004651 HB RX, NO CHARGE ITEM: Performed by: HOSPITALIST

## 2022-08-21 PROCEDURE — G0378 HOSPITAL OBSERVATION PER HR: HCPCS

## 2022-08-21 PROCEDURE — 82962 GLUCOSE BLOOD TEST: CPT

## 2022-08-21 PROCEDURE — 10002800 HB RX 250 W HCPCS: Performed by: HOSPITALIST

## 2022-08-21 PROCEDURE — 80048 BASIC METABOLIC PNL TOTAL CA: CPT | Performed by: HOSPITALIST

## 2022-08-21 PROCEDURE — 83735 ASSAY OF MAGNESIUM: CPT | Performed by: HOSPITALIST

## 2022-08-21 PROCEDURE — 96374 THER/PROPH/DIAG INJ IV PUSH: CPT

## 2022-08-21 PROCEDURE — 81001 URINALYSIS AUTO W/SCOPE: CPT | Performed by: EMERGENCY MEDICINE

## 2022-08-21 PROCEDURE — 10004281 HB COUNTER-STAFF TIME PER 15 MIN

## 2022-08-21 PROCEDURE — 97165 OT EVAL LOW COMPLEX 30 MIN: CPT

## 2022-08-21 PROCEDURE — 10002019 HB COUNTER RESP ASSESSMENT

## 2022-08-21 PROCEDURE — 87086 URINE CULTURE/COLONY COUNT: CPT | Performed by: EMERGENCY MEDICINE

## 2022-08-21 PROCEDURE — 97535 SELF CARE MNGMENT TRAINING: CPT

## 2022-08-21 PROCEDURE — 10002803 HB RX 637: Performed by: HOSPITALIST

## 2022-08-21 PROCEDURE — 97161 PT EVAL LOW COMPLEX 20 MIN: CPT

## 2022-08-21 PROCEDURE — 97530 THERAPEUTIC ACTIVITIES: CPT

## 2022-08-21 PROCEDURE — 96372 THER/PROPH/DIAG INJ SC/IM: CPT

## 2022-08-21 PROCEDURE — 36415 COLL VENOUS BLD VENIPUNCTURE: CPT | Performed by: HOSPITALIST

## 2022-08-21 PROCEDURE — 94660 CPAP INITIATION&MGMT: CPT

## 2022-08-21 PROCEDURE — 85027 COMPLETE CBC AUTOMATED: CPT | Performed by: HOSPITALIST

## 2022-08-21 RX ORDER — LEVOFLOXACIN 500 MG/1
500 TABLET, FILM COATED ORAL DAILY
Status: ON HOLD | COMMUNITY
Start: 2022-08-19 | End: 2022-08-24 | Stop reason: HOSPADM

## 2022-08-21 RX ORDER — LANOLIN ALCOHOL/MO/W.PET/CERES
400 CREAM (GRAM) TOPICAL ONCE
Status: COMPLETED | OUTPATIENT
Start: 2022-08-21 | End: 2022-08-21

## 2022-08-21 RX ORDER — IPRATROPIUM BROMIDE AND ALBUTEROL SULFATE 2.5; .5 MG/3ML; MG/3ML
3 SOLUTION RESPIRATORY (INHALATION) EVERY 6 HOURS PRN
COMMUNITY

## 2022-08-21 RX ORDER — BISACODYL 10 MG
10 SUPPOSITORY, RECTAL RECTAL DAILY PRN
COMMUNITY

## 2022-08-21 RX ORDER — CARVEDILOL 6.25 MG/1
6.25 TABLET ORAL EVERY 12 HOURS SCHEDULED
Status: DISCONTINUED | OUTPATIENT
Start: 2022-08-21 | End: 2022-08-22

## 2022-08-21 RX ORDER — POLYETHYLENE GLYCOL 3350 17 G/17G
17 POWDER, FOR SOLUTION ORAL DAILY
COMMUNITY

## 2022-08-21 RX ADMIN — ATORVASTATIN CALCIUM 40 MG: 40 TABLET, FILM COATED ORAL at 17:08

## 2022-08-21 RX ADMIN — ONDANSETRON 4 MG: 2 INJECTION INTRAMUSCULAR; INTRAVENOUS at 19:41

## 2022-08-21 RX ADMIN — Medication 400 MG: at 17:08

## 2022-08-21 RX ADMIN — HEPARIN SODIUM 5000 UNITS: 5000 INJECTION INTRAVENOUS; SUBCUTANEOUS at 14:09

## 2022-08-21 RX ADMIN — ASPIRIN 81 MG: 81 TABLET, COATED ORAL at 08:50

## 2022-08-21 RX ADMIN — SODIUM CHLORIDE, PRESERVATIVE FREE 2 ML: 5 INJECTION INTRAVENOUS at 09:50

## 2022-08-21 RX ADMIN — Medication 400 MG: at 08:50

## 2022-08-21 RX ADMIN — NYSTATIN: 100000 POWDER TOPICAL at 08:50

## 2022-08-21 RX ADMIN — HEPARIN SODIUM 5000 UNITS: 5000 INJECTION INTRAVENOUS; SUBCUTANEOUS at 05:52

## 2022-08-21 RX ADMIN — EXEMESTANE 25 MG: 25 TABLET ORAL at 08:50

## 2022-08-21 ASSESSMENT — ENCOUNTER SYMPTOMS: PAIN SEVERITY NOW: 0

## 2022-08-21 ASSESSMENT — COGNITIVE AND FUNCTIONAL STATUS - GENERAL
HELP NEEDED FOR TOILETING: A LITTLE
DAILY_ACTIVITY_RAW_SCORE: 21
HELP NEEDED FOR BATHING: A LITTLE
HELP NEEDED DRESSING REGULAR LOWER BODY CLOTHING: A LITTLE
BASIC_MOBILITY_RAW_SCORE: 15
BASIC_MOBILITY_CONVERTED_SCORE: 36.97
DAILY_ACTIVITY_CONVERTED_SCORE: 44.27

## 2022-08-21 ASSESSMENT — PULMONARY FUNCTION TESTS
FEV1: 0
FEV1/FVC: UNABLE TO OBTAIN, OR GREATER THAN 70%
FEV1: 0
FEV1_PREDICTED: 0
FEV1: 0

## 2022-08-21 ASSESSMENT — PAIN SCALES - GENERAL
PAINLEVEL_OUTOF10: 0

## 2022-08-21 ASSESSMENT — ACTIVITIES OF DAILY LIVING (ADL)
GROOMING: INDEPENDENT
HOME_MANAGEMENT_TIME_ENTRY: 8
PRIOR_ADL_BATHING: INDEPENDENT
PRIOR_ADL_TOILETING: INDEPENDENT

## 2022-08-22 ENCOUNTER — APPOINTMENT (OUTPATIENT)
Dept: ULTRASOUND IMAGING | Age: 77
DRG: 314 | End: 2022-08-22
Attending: HOSPITALIST

## 2022-08-22 LAB
ANION GAP SERPL CALC-SCNC: 6 MMOL/L (ref 7–19)
ANION GAP SERPL CALC-SCNC: 7 MMOL/L (ref 7–19)
APPEARANCE UR: ABNORMAL
BACTERIA #/AREA URNS HPF: ABNORMAL /HPF
BACTERIA UR CULT: ABNORMAL
BILIRUB UR QL STRIP: NEGATIVE
BUN SERPL-MCNC: 50 MG/DL (ref 6–20)
BUN SERPL-MCNC: 52 MG/DL (ref 6–20)
BUN/CREAT SERPL: 28 (ref 7–25)
BUN/CREAT SERPL: 28 (ref 7–25)
CALCIUM SERPL-MCNC: 8.8 MG/DL (ref 8.4–10.2)
CALCIUM SERPL-MCNC: 8.9 MG/DL (ref 8.4–10.2)
CHLORIDE SERPL-SCNC: 101 MMOL/L (ref 97–110)
CHLORIDE SERPL-SCNC: 102 MMOL/L (ref 97–110)
CO2 SERPL-SCNC: 34 MMOL/L (ref 21–32)
CO2 SERPL-SCNC: 35 MMOL/L (ref 21–32)
COLOR UR: YELLOW
CREAT SERPL-MCNC: 1.78 MG/DL (ref 0.51–0.95)
CREAT SERPL-MCNC: 1.85 MG/DL (ref 0.51–0.95)
CREAT UR-MCNC: 113 MG/DL
FASTING DURATION TIME PATIENT: ABNORMAL H
FASTING DURATION TIME PATIENT: ABNORMAL H
GFR SERPLBLD BASED ON 1.73 SQ M-ARVRAT: 28 ML/MIN
GFR SERPLBLD BASED ON 1.73 SQ M-ARVRAT: 29 ML/MIN
GLUCOSE BLDC GLUCOMTR-MCNC: 108 MG/DL (ref 70–99)
GLUCOSE BLDC GLUCOMTR-MCNC: 137 MG/DL (ref 70–99)
GLUCOSE BLDC GLUCOMTR-MCNC: 171 MG/DL (ref 70–99)
GLUCOSE BLDC GLUCOMTR-MCNC: 192 MG/DL (ref 70–99)
GLUCOSE SERPL-MCNC: 137 MG/DL (ref 70–99)
GLUCOSE SERPL-MCNC: 153 MG/DL (ref 70–99)
GLUCOSE UR STRIP-MCNC: >=500 MG/DL
HGB UR QL STRIP: NEGATIVE
HYALINE CASTS #/AREA URNS LPF: ABNORMAL /LPF
KETONES UR STRIP-MCNC: NEGATIVE MG/DL
LEUKOCYTE ESTERASE UR QL STRIP: ABNORMAL
MAGNESIUM SERPL-MCNC: 2.2 MG/DL (ref 1.7–2.4)
NITRITE UR QL STRIP: NEGATIVE
PH UR STRIP: 5 [PH] (ref 5–7)
POTASSIUM SERPL-SCNC: 4.6 MMOL/L (ref 3.4–5.1)
POTASSIUM SERPL-SCNC: 4.7 MMOL/L (ref 3.4–5.1)
PROT UR STRIP-MCNC: NEGATIVE MG/DL
RBC #/AREA URNS HPF: ABNORMAL /HPF
SODIUM SERPL-SCNC: 137 MMOL/L (ref 135–145)
SODIUM SERPL-SCNC: 138 MMOL/L (ref 135–145)
SODIUM UR-SCNC: 29 MMOL/L
SP GR UR STRIP: 1.01 (ref 1–1.03)
SQUAMOUS #/AREA URNS HPF: ABNORMAL /HPF
UROBILINOGEN UR STRIP-MCNC: 0.2 MG/DL
WBC #/AREA URNS HPF: >100 /HPF
YEAST URNS QL MICRO: PRESENT

## 2022-08-22 PROCEDURE — 81001 URINALYSIS AUTO W/SCOPE: CPT | Performed by: HOSPITALIST

## 2022-08-22 PROCEDURE — 80048 BASIC METABOLIC PNL TOTAL CA: CPT | Performed by: HOSPITALIST

## 2022-08-22 PROCEDURE — G0378 HOSPITAL OBSERVATION PER HR: HCPCS

## 2022-08-22 PROCEDURE — 96372 THER/PROPH/DIAG INJ SC/IM: CPT

## 2022-08-22 PROCEDURE — 82570 ASSAY OF URINE CREATININE: CPT | Performed by: HOSPITALIST

## 2022-08-22 PROCEDURE — 87086 URINE CULTURE/COLONY COUNT: CPT | Performed by: HOSPITALIST

## 2022-08-22 PROCEDURE — 94660 CPAP INITIATION&MGMT: CPT

## 2022-08-22 PROCEDURE — 10006031 HB ROOM CHARGE TELEMETRY

## 2022-08-22 PROCEDURE — 83735 ASSAY OF MAGNESIUM: CPT | Performed by: HOSPITALIST

## 2022-08-22 PROCEDURE — 76775 US EXAM ABDO BACK WALL LIM: CPT

## 2022-08-22 PROCEDURE — 10002803 HB RX 637: Performed by: HOSPITALIST

## 2022-08-22 PROCEDURE — 84300 ASSAY OF URINE SODIUM: CPT | Performed by: HOSPITALIST

## 2022-08-22 PROCEDURE — 36415 COLL VENOUS BLD VENIPUNCTURE: CPT | Performed by: HOSPITALIST

## 2022-08-22 PROCEDURE — 10004180 HB COUNTER-TRANSPORT

## 2022-08-22 PROCEDURE — 10004651 HB RX, NO CHARGE ITEM: Performed by: HOSPITALIST

## 2022-08-22 PROCEDURE — 82962 GLUCOSE BLOOD TEST: CPT

## 2022-08-22 PROCEDURE — 10002800 HB RX 250 W HCPCS: Performed by: HOSPITALIST

## 2022-08-22 PROCEDURE — 10002807 HB RX 258: Performed by: HOSPITALIST

## 2022-08-22 PROCEDURE — 10004281 HB COUNTER-STAFF TIME PER 15 MIN

## 2022-08-22 RX ORDER — SODIUM CHLORIDE 9 MG/ML
INJECTION, SOLUTION INTRAVENOUS CONTINUOUS
Status: DISCONTINUED | OUTPATIENT
Start: 2022-08-22 | End: 2022-08-24

## 2022-08-22 RX ADMIN — HEPARIN SODIUM 5000 UNITS: 5000 INJECTION INTRAVENOUS; SUBCUTANEOUS at 21:35

## 2022-08-22 RX ADMIN — ASPIRIN 81 MG: 81 TABLET, COATED ORAL at 09:29

## 2022-08-22 RX ADMIN — HEPARIN SODIUM 5000 UNITS: 5000 INJECTION INTRAVENOUS; SUBCUTANEOUS at 14:32

## 2022-08-22 RX ADMIN — HEPARIN SODIUM 5000 UNITS: 5000 INJECTION INTRAVENOUS; SUBCUTANEOUS at 05:44

## 2022-08-22 RX ADMIN — SODIUM CHLORIDE, PRESERVATIVE FREE 2 ML: 5 INJECTION INTRAVENOUS at 21:35

## 2022-08-22 RX ADMIN — ATORVASTATIN CALCIUM 40 MG: 40 TABLET, FILM COATED ORAL at 18:03

## 2022-08-22 RX ADMIN — EXEMESTANE 25 MG: 25 TABLET ORAL at 09:29

## 2022-08-22 RX ADMIN — SODIUM CHLORIDE: 9 INJECTION, SOLUTION INTRAVENOUS at 23:24

## 2022-08-22 RX ADMIN — SODIUM CHLORIDE, PRESERVATIVE FREE 2 ML: 5 INJECTION INTRAVENOUS at 00:07

## 2022-08-22 RX ADMIN — NYSTATIN: 100000 POWDER TOPICAL at 21:35

## 2022-08-22 RX ADMIN — HEPARIN SODIUM 5000 UNITS: 5000 INJECTION INTRAVENOUS; SUBCUTANEOUS at 00:01

## 2022-08-22 RX ADMIN — SODIUM CHLORIDE, PRESERVATIVE FREE 2 ML: 5 INJECTION INTRAVENOUS at 10:55

## 2022-08-22 RX ADMIN — ACETAMINOPHEN 650 MG: 325 TABLET ORAL at 05:44

## 2022-08-22 RX ADMIN — NYSTATIN: 100000 POWDER TOPICAL at 10:53

## 2022-08-22 RX ADMIN — SODIUM CHLORIDE: 9 INJECTION, SOLUTION INTRAVENOUS at 10:48

## 2022-08-22 RX ADMIN — CARVEDILOL 6.25 MG: 6.25 TABLET, FILM COATED ORAL at 00:01

## 2022-08-22 RX ADMIN — NYSTATIN: 100000 POWDER TOPICAL at 00:00

## 2022-08-22 ASSESSMENT — PAIN SCALES - GENERAL
PAINLEVEL_OUTOF10: 0
PAINLEVEL_OUTOF10: 6

## 2022-08-23 LAB
ANION GAP SERPL CALC-SCNC: 7 MMOL/L (ref 7–19)
BUN SERPL-MCNC: 43 MG/DL (ref 6–20)
BUN/CREAT SERPL: 30 (ref 7–25)
CALCIUM SERPL-MCNC: 8.8 MG/DL (ref 8.4–10.2)
CHLORIDE SERPL-SCNC: 103 MMOL/L (ref 97–110)
CO2 SERPL-SCNC: 33 MMOL/L (ref 21–32)
CREAT SERPL-MCNC: 1.44 MG/DL (ref 0.51–0.95)
FASTING DURATION TIME PATIENT: ABNORMAL H
GFR SERPLBLD BASED ON 1.73 SQ M-ARVRAT: 37 ML/MIN
GLUCOSE BLDC GLUCOMTR-MCNC: 129 MG/DL (ref 70–99)
GLUCOSE BLDC GLUCOMTR-MCNC: 130 MG/DL (ref 70–99)
GLUCOSE BLDC GLUCOMTR-MCNC: 144 MG/DL (ref 70–99)
GLUCOSE BLDC GLUCOMTR-MCNC: 183 MG/DL (ref 70–99)
GLUCOSE SERPL-MCNC: 136 MG/DL (ref 70–99)
POTASSIUM SERPL-SCNC: 4.6 MMOL/L (ref 3.4–5.1)
SODIUM SERPL-SCNC: 138 MMOL/L (ref 135–145)

## 2022-08-23 PROCEDURE — 10002803 HB RX 637: Performed by: HOSPITALIST

## 2022-08-23 PROCEDURE — 80048 BASIC METABOLIC PNL TOTAL CA: CPT | Performed by: HOSPITALIST

## 2022-08-23 PROCEDURE — 10002800 HB RX 250 W HCPCS: Performed by: HOSPITALIST

## 2022-08-23 PROCEDURE — 10006031 HB ROOM CHARGE TELEMETRY

## 2022-08-23 PROCEDURE — 10004651 HB RX, NO CHARGE ITEM: Performed by: HOSPITALIST

## 2022-08-23 PROCEDURE — 10004281 HB COUNTER-STAFF TIME PER 15 MIN

## 2022-08-23 PROCEDURE — 10004180 HB COUNTER-TRANSPORT

## 2022-08-23 PROCEDURE — 36415 COLL VENOUS BLD VENIPUNCTURE: CPT | Performed by: HOSPITALIST

## 2022-08-23 PROCEDURE — 94660 CPAP INITIATION&MGMT: CPT

## 2022-08-23 PROCEDURE — 97530 THERAPEUTIC ACTIVITIES: CPT

## 2022-08-23 RX ORDER — FLUCONAZOLE 100 MG/1
100 TABLET ORAL DAILY
Status: DISCONTINUED | OUTPATIENT
Start: 2022-08-23 | End: 2022-08-24 | Stop reason: HOSPADM

## 2022-08-23 RX ADMIN — HEPARIN SODIUM 5000 UNITS: 5000 INJECTION INTRAVENOUS; SUBCUTANEOUS at 21:00

## 2022-08-23 RX ADMIN — NYSTATIN: 100000 POWDER TOPICAL at 09:59

## 2022-08-23 RX ADMIN — FLUCONAZOLE 100 MG: 100 TABLET ORAL at 09:45

## 2022-08-23 RX ADMIN — HEPARIN SODIUM 5000 UNITS: 5000 INJECTION INTRAVENOUS; SUBCUTANEOUS at 14:11

## 2022-08-23 RX ADMIN — ATORVASTATIN CALCIUM 40 MG: 40 TABLET, FILM COATED ORAL at 17:50

## 2022-08-23 RX ADMIN — ASPIRIN 81 MG: 81 TABLET, COATED ORAL at 09:45

## 2022-08-23 RX ADMIN — HEPARIN SODIUM 5000 UNITS: 5000 INJECTION INTRAVENOUS; SUBCUTANEOUS at 06:03

## 2022-08-23 RX ADMIN — INSULIN LISPRO 2 UNITS: 100 INJECTION, SOLUTION INTRAVENOUS; SUBCUTANEOUS at 09:45

## 2022-08-23 RX ADMIN — NYSTATIN: 100000 POWDER TOPICAL at 21:04

## 2022-08-23 RX ADMIN — EXEMESTANE 25 MG: 25 TABLET ORAL at 09:45

## 2022-08-23 ASSESSMENT — PAIN SCALES - GENERAL
PAINLEVEL_OUTOF10: 1
PAINLEVEL_OUTOF10: 1

## 2022-08-23 ASSESSMENT — COGNITIVE AND FUNCTIONAL STATUS - GENERAL
BASIC_MOBILITY_RAW_SCORE: 15
BASIC_MOBILITY_CONVERTED_SCORE: 36.97

## 2022-08-24 VITALS
TEMPERATURE: 97.9 F | BODY MASS INDEX: 43.4 KG/M2 | HEIGHT: 69 IN | RESPIRATION RATE: 18 BRPM | DIASTOLIC BLOOD PRESSURE: 77 MMHG | WEIGHT: 293 LBS | SYSTOLIC BLOOD PRESSURE: 134 MMHG | OXYGEN SATURATION: 98 % | HEART RATE: 76 BPM

## 2022-08-24 LAB
ANION GAP SERPL CALC-SCNC: 4 MMOL/L (ref 7–19)
BUN SERPL-MCNC: 31 MG/DL (ref 6–20)
BUN/CREAT SERPL: 28 (ref 7–25)
CALCIUM SERPL-MCNC: 8.9 MG/DL (ref 8.4–10.2)
CHLORIDE SERPL-SCNC: 108 MMOL/L (ref 97–110)
CO2 SERPL-SCNC: 34 MMOL/L (ref 21–32)
CREAT SERPL-MCNC: 1.09 MG/DL (ref 0.51–0.95)
FASTING DURATION TIME PATIENT: ABNORMAL H
GFR SERPLBLD BASED ON 1.73 SQ M-ARVRAT: 52 ML/MIN
GLUCOSE BLDC GLUCOMTR-MCNC: 126 MG/DL (ref 70–99)
GLUCOSE BLDC GLUCOMTR-MCNC: 127 MG/DL (ref 70–99)
GLUCOSE SERPL-MCNC: 128 MG/DL (ref 70–99)
POTASSIUM SERPL-SCNC: 4.8 MMOL/L (ref 3.4–5.1)
RAINBOW EXTRA TUBES HOLD SPECIMEN: NORMAL
SARS-COV-2 RNA RESP QL NAA+PROBE: NOT DETECTED
SERVICE CMNT-IMP: NORMAL
SERVICE CMNT-IMP: NORMAL
SODIUM SERPL-SCNC: 141 MMOL/L (ref 135–145)

## 2022-08-24 PROCEDURE — 97110 THERAPEUTIC EXERCISES: CPT

## 2022-08-24 PROCEDURE — 36415 COLL VENOUS BLD VENIPUNCTURE: CPT | Performed by: HOSPITALIST

## 2022-08-24 PROCEDURE — 10002803 HB RX 637: Performed by: HOSPITALIST

## 2022-08-24 PROCEDURE — 94660 CPAP INITIATION&MGMT: CPT

## 2022-08-24 PROCEDURE — 97116 GAIT TRAINING THERAPY: CPT

## 2022-08-24 PROCEDURE — 10004281 HB COUNTER-STAFF TIME PER 15 MIN

## 2022-08-24 PROCEDURE — 87635 SARS-COV-2 COVID-19 AMP PRB: CPT | Performed by: HOSPITALIST

## 2022-08-24 PROCEDURE — 80048 BASIC METABOLIC PNL TOTAL CA: CPT | Performed by: HOSPITALIST

## 2022-08-24 PROCEDURE — 10004651 HB RX, NO CHARGE ITEM: Performed by: HOSPITALIST

## 2022-08-24 PROCEDURE — 10002800 HB RX 250 W HCPCS: Performed by: HOSPITALIST

## 2022-08-24 PROCEDURE — 10004180 HB COUNTER-TRANSPORT

## 2022-08-24 RX ORDER — FLUCONAZOLE 100 MG/1
100 TABLET ORAL DAILY
Qty: 12 TABLET | Refills: 0 | Status: SHIPPED | COMMUNITY
Start: 2022-08-25

## 2022-08-24 RX ADMIN — EXEMESTANE 25 MG: 25 TABLET ORAL at 10:25

## 2022-08-24 RX ADMIN — NYSTATIN: 100000 POWDER TOPICAL at 10:28

## 2022-08-24 RX ADMIN — ASPIRIN 81 MG: 81 TABLET, COATED ORAL at 10:25

## 2022-08-24 RX ADMIN — FLUCONAZOLE 100 MG: 100 TABLET ORAL at 10:25

## 2022-08-24 RX ADMIN — HEPARIN SODIUM 5000 UNITS: 5000 INJECTION INTRAVENOUS; SUBCUTANEOUS at 05:47

## 2022-08-24 ASSESSMENT — PAIN SCALES - GENERAL: PAINLEVEL_OUTOF10: 0

## 2022-08-24 ASSESSMENT — ENCOUNTER SYMPTOMS: PAIN SEVERITY NOW: 0

## 2022-08-26 LAB — BACTERIA UR CULT: ABNORMAL

## 2022-09-11 ENCOUNTER — APPOINTMENT (OUTPATIENT)
Dept: GENERAL RADIOLOGY | Age: 77
End: 2022-09-11
Attending: EMERGENCY MEDICINE

## 2022-09-11 ENCOUNTER — HOSPITAL ENCOUNTER (EMERGENCY)
Age: 77
Discharge: HOME | End: 2022-09-11
Attending: EMERGENCY MEDICINE

## 2022-09-11 VITALS
SYSTOLIC BLOOD PRESSURE: 163 MMHG | TEMPERATURE: 98.2 F | OXYGEN SATURATION: 97 % | DIASTOLIC BLOOD PRESSURE: 75 MMHG | WEIGHT: 293 LBS | HEART RATE: 82 BPM | BODY MASS INDEX: 45.77 KG/M2 | RESPIRATION RATE: 20 BRPM

## 2022-09-11 DIAGNOSIS — S82.202A TIBIA/FIBULA FRACTURE, LEFT, CLOSED, INITIAL ENCOUNTER: ICD-10-CM

## 2022-09-11 DIAGNOSIS — S82.402A TIBIA/FIBULA FRACTURE, LEFT, CLOSED, INITIAL ENCOUNTER: ICD-10-CM

## 2022-09-11 DIAGNOSIS — R53.1 WEAKNESS: Primary | ICD-10-CM

## 2022-09-11 DIAGNOSIS — N17.9 AKI (ACUTE KIDNEY INJURY) (CMD): ICD-10-CM

## 2022-09-11 LAB
ALBUMIN SERPL-MCNC: 3 G/DL (ref 3.6–5.1)
ALBUMIN/GLOB SERPL: 0.9 {RATIO} (ref 1–2.4)
ALP SERPL-CCNC: 75 UNITS/L (ref 45–117)
ALT SERPL-CCNC: 29 UNITS/L
ANION GAP SERPL CALC-SCNC: 7 MMOL/L (ref 7–19)
APPEARANCE UR: CLEAR
AST SERPL-CCNC: 21 UNITS/L
ATRIAL RATE (BPM): 89
BASOPHILS # BLD: 0 K/MCL (ref 0–0.3)
BASOPHILS NFR BLD: 0 %
BILIRUB SERPL-MCNC: 0.6 MG/DL (ref 0.2–1)
BILIRUB UR QL STRIP: NEGATIVE
BUN SERPL-MCNC: 12 MG/DL (ref 6–20)
BUN/CREAT SERPL: 16 (ref 7–25)
CALCIUM SERPL-MCNC: 9.8 MG/DL (ref 8.4–10.2)
CHLORIDE SERPL-SCNC: 105 MMOL/L (ref 97–110)
CO2 SERPL-SCNC: 33 MMOL/L (ref 21–32)
COLOR UR: YELLOW
CREAT SERPL-MCNC: 0.75 MG/DL (ref 0.51–0.95)
DEPRECATED RDW RBC: 53.3 FL (ref 39–50)
EOSINOPHIL # BLD: 0.2 K/MCL (ref 0–0.5)
EOSINOPHIL NFR BLD: 3 %
ERYTHROCYTE [DISTWIDTH] IN BLOOD: 15.3 % (ref 11–15)
FASTING DURATION TIME PATIENT: ABNORMAL H
GFR SERPLBLD BASED ON 1.73 SQ M-ARVRAT: 82 ML/MIN
GLOBULIN SER-MCNC: 3.4 G/DL (ref 2–4)
GLUCOSE SERPL-MCNC: 145 MG/DL (ref 70–99)
GLUCOSE UR STRIP-MCNC: NEGATIVE MG/DL
HCT VFR BLD CALC: 41.1 % (ref 36–46.5)
HGB BLD-MCNC: 13 G/DL (ref 12–15.5)
HGB UR QL STRIP: NEGATIVE
IMM GRANULOCYTES # BLD AUTO: 0 K/MCL (ref 0–0.2)
IMM GRANULOCYTES # BLD: 0 %
KETONES UR STRIP-MCNC: NEGATIVE MG/DL
LEUKOCYTE ESTERASE UR QL STRIP: NEGATIVE
LYMPHOCYTES # BLD: 1.1 K/MCL (ref 1–4)
LYMPHOCYTES NFR BLD: 19 %
MAGNESIUM SERPL-MCNC: 1.5 MG/DL (ref 1.7–2.4)
MCH RBC QN AUTO: 30 PG (ref 26–34)
MCHC RBC AUTO-ENTMCNC: 31.6 G/DL (ref 32–36.5)
MCV RBC AUTO: 94.7 FL (ref 78–100)
MONOCYTES # BLD: 0.4 K/MCL (ref 0.3–0.9)
MONOCYTES NFR BLD: 7 %
NEUTROPHILS # BLD: 4 K/MCL (ref 1.8–7.7)
NEUTROPHILS NFR BLD: 71 %
NITRITE UR QL STRIP: NEGATIVE
NRBC BLD MANUAL-RTO: 0 /100 WBC
NT-PROBNP SERPL-MCNC: 136 PG/ML
P AXIS (DEGREES): 40
PH UR STRIP: 5 [PH] (ref 5–7)
PLATELET # BLD AUTO: 200 K/MCL (ref 140–450)
POTASSIUM SERPL-SCNC: 3.7 MMOL/L (ref 3.4–5.1)
PR-INTERVAL (MSEC): 164
PROT SERPL-MCNC: 6.4 G/DL (ref 6.4–8.2)
PROT UR STRIP-MCNC: NEGATIVE MG/DL
QRS-INTERVAL (MSEC): 92
QT-INTERVAL (MSEC): 366
QTC: 445
R AXIS (DEGREES): 29
RAINBOW EXTRA TUBES HOLD SPECIMEN: NORMAL
RBC # BLD: 4.34 MIL/MCL (ref 4–5.2)
REPORT TEXT: NORMAL
SARS-COV-2 RNA RESP QL NAA+PROBE: NOT DETECTED
SERVICE CMNT-IMP: NORMAL
SERVICE CMNT-IMP: NORMAL
SODIUM SERPL-SCNC: 141 MMOL/L (ref 135–145)
SP GR UR STRIP: 1.01 (ref 1–1.03)
T AXIS (DEGREES): 33
TROPONIN I SERPL DL<=0.01 NG/ML-MCNC: 9 NG/L
TSH SERPL-ACNC: 1.9 MCUNITS/ML (ref 0.35–5)
UROBILINOGEN UR STRIP-MCNC: 0.2 MG/DL
VENTRICULAR RATE EKG/MIN (BPM): 89
WBC # BLD: 5.7 K/MCL (ref 4.2–11)

## 2022-09-11 PROCEDURE — 83880 ASSAY OF NATRIURETIC PEPTIDE: CPT | Performed by: EMERGENCY MEDICINE

## 2022-09-11 PROCEDURE — 96365 THER/PROPH/DIAG IV INF INIT: CPT

## 2022-09-11 PROCEDURE — 97161 PT EVAL LOW COMPLEX 20 MIN: CPT

## 2022-09-11 PROCEDURE — 84443 ASSAY THYROID STIM HORMONE: CPT | Performed by: EMERGENCY MEDICINE

## 2022-09-11 PROCEDURE — 83735 ASSAY OF MAGNESIUM: CPT | Performed by: EMERGENCY MEDICINE

## 2022-09-11 PROCEDURE — 87635 SARS-COV-2 COVID-19 AMP PRB: CPT | Performed by: EMERGENCY MEDICINE

## 2022-09-11 PROCEDURE — 93005 ELECTROCARDIOGRAM TRACING: CPT | Performed by: EMERGENCY MEDICINE

## 2022-09-11 PROCEDURE — 10002800 HB RX 250 W HCPCS: Performed by: EMERGENCY MEDICINE

## 2022-09-11 PROCEDURE — 99284 EMERGENCY DEPT VISIT MOD MDM: CPT

## 2022-09-11 PROCEDURE — 93010 ELECTROCARDIOGRAM REPORT: CPT | Performed by: INTERNAL MEDICINE

## 2022-09-11 PROCEDURE — 81003 URINALYSIS AUTO W/O SCOPE: CPT | Performed by: EMERGENCY MEDICINE

## 2022-09-11 PROCEDURE — C9803 HOPD COVID-19 SPEC COLLECT: HCPCS

## 2022-09-11 PROCEDURE — 80053 COMPREHEN METABOLIC PANEL: CPT | Performed by: EMERGENCY MEDICINE

## 2022-09-11 PROCEDURE — 71045 X-RAY EXAM CHEST 1 VIEW: CPT

## 2022-09-11 PROCEDURE — 85025 COMPLETE CBC W/AUTO DIFF WBC: CPT | Performed by: EMERGENCY MEDICINE

## 2022-09-11 PROCEDURE — 97530 THERAPEUTIC ACTIVITIES: CPT

## 2022-09-11 PROCEDURE — 96366 THER/PROPH/DIAG IV INF ADDON: CPT

## 2022-09-11 PROCEDURE — 84484 ASSAY OF TROPONIN QUANT: CPT | Performed by: EMERGENCY MEDICINE

## 2022-09-11 RX ORDER — LANOLIN ALCOHOL/MO/W.PET/CERES
400 CREAM (GRAM) TOPICAL DAILY
Qty: 14 TABLET | Refills: 0 | Status: SHIPPED | OUTPATIENT
Start: 2022-09-11 | End: 2022-09-25

## 2022-09-11 RX ADMIN — MAGNESIUM SULFATE HEPTAHYDRATE 2 G: 40 INJECTION, SOLUTION INTRAVENOUS at 09:56

## 2022-09-11 ASSESSMENT — ENCOUNTER SYMPTOMS
ABDOMINAL PAIN: 0
LIGHT-HEADEDNESS: 0
ADENOPATHY: 0
NAUSEA: 0
FEVER: 0
FATIGUE: 1
CONFUSION: 0
SORE THROAT: 0
CHILLS: 0
PAIN SEVERITY NOW: 0
DIARRHEA: 0
COUGH: 0
BACK PAIN: 0
DIZZINESS: 0
SHORTNESS OF BREATH: 0
VOMITING: 0

## 2022-09-11 ASSESSMENT — COGNITIVE AND FUNCTIONAL STATUS - GENERAL
BASIC_MOBILITY_RAW_SCORE: 21
BASIC_MOBILITY_CONVERTED_SCORE: 45.55

## 2022-09-11 ASSESSMENT — ACTIVITIES OF DAILY LIVING (ADL): PRIOR_ADL: MODIFIED INDEPENDENT

## 2022-09-22 ENCOUNTER — LAB REQUISITION (OUTPATIENT)
Dept: LAB | Age: 77
End: 2022-09-22

## 2022-09-22 DIAGNOSIS — I50.20 UNSPECIFIED SYSTOLIC (CONGESTIVE) HEART FAILURE (CMD): ICD-10-CM

## 2022-09-22 DIAGNOSIS — E11.9 TYPE 2 DIABETES MELLITUS WITHOUT COMPLICATIONS (CMD): ICD-10-CM

## 2022-09-22 LAB
ALBUMIN SERPL-MCNC: 3.2 G/DL (ref 3.6–5.1)
ALBUMIN/GLOB SERPL: 1 {RATIO} (ref 1–2.4)
ALP SERPL-CCNC: 77 UNITS/L (ref 45–117)
ALT SERPL-CCNC: 21 UNITS/L
ANION GAP SERPL CALC-SCNC: 11 MMOL/L (ref 7–19)
AST SERPL-CCNC: 12 UNITS/L
BILIRUB SERPL-MCNC: 0.5 MG/DL (ref 0.2–1)
BUN SERPL-MCNC: 21 MG/DL (ref 6–20)
BUN/CREAT SERPL: 30 (ref 7–25)
CALCIUM SERPL-MCNC: 9.4 MG/DL (ref 8.4–10.2)
CHLORIDE SERPL-SCNC: 104 MMOL/L (ref 97–110)
CO2 SERPL-SCNC: 32 MMOL/L (ref 21–32)
CREAT SERPL-MCNC: 0.7 MG/DL (ref 0.51–0.95)
FASTING DURATION TIME PATIENT: ABNORMAL H
GFR SERPLBLD BASED ON 1.73 SQ M-ARVRAT: 89 ML/MIN
GLOBULIN SER-MCNC: 3.2 G/DL (ref 2–4)
GLUCOSE SERPL-MCNC: 114 MG/DL (ref 70–99)
POTASSIUM SERPL-SCNC: 4.1 MMOL/L (ref 3.4–5.1)
PROT SERPL-MCNC: 6.4 G/DL (ref 6.4–8.2)
SODIUM SERPL-SCNC: 143 MMOL/L (ref 135–145)

## 2022-09-22 PROCEDURE — 80053 COMPREHEN METABOLIC PANEL: CPT | Performed by: CLINICAL MEDICAL LABORATORY

## 2022-10-20 ENCOUNTER — CLINICAL ABSTRACT (OUTPATIENT)
Dept: HEMATOLOGY/ONCOLOGY | Age: 77
End: 2022-10-20

## 2022-10-20 DIAGNOSIS — C50.312 MALIGNANT NEOPLASM OF LOWER-INNER QUADRANT OF LEFT BREAST IN FEMALE, ESTROGEN RECEPTOR POSITIVE (CMD): Primary | ICD-10-CM

## 2022-10-20 DIAGNOSIS — M85.80 OSTEOPENIA, UNSPECIFIED LOCATION: Primary | ICD-10-CM

## 2022-10-20 DIAGNOSIS — Z17.0 MALIGNANT NEOPLASM OF LOWER-INNER QUADRANT OF LEFT BREAST IN FEMALE, ESTROGEN RECEPTOR POSITIVE (CMD): Primary | ICD-10-CM

## 2022-10-20 RX ORDER — METHYLPREDNISOLONE SODIUM SUCCINATE 125 MG/2ML
125 INJECTION, POWDER, LYOPHILIZED, FOR SOLUTION INTRAMUSCULAR; INTRAVENOUS
Status: CANCELLED | OUTPATIENT
Start: 2022-10-21

## 2022-10-20 RX ORDER — FAMOTIDINE 10 MG/ML
20 INJECTION, SOLUTION INTRAVENOUS
Status: CANCELLED | OUTPATIENT
Start: 2022-10-21

## 2022-10-20 RX ORDER — ALBUTEROL SULFATE 90 UG/1
2 AEROSOL, METERED RESPIRATORY (INHALATION)
Status: CANCELLED | OUTPATIENT
Start: 2022-10-21

## 2022-10-20 RX ORDER — DIPHENHYDRAMINE HYDROCHLORIDE 50 MG/ML
50 INJECTION INTRAMUSCULAR; INTRAVENOUS
Status: CANCELLED
Start: 2022-10-21

## 2022-10-21 ENCOUNTER — HOSPITAL ENCOUNTER (OUTPATIENT)
Dept: LAB | Age: 77
Discharge: STILL ADMITTED OR ADMINISTRATIVE | End: 2022-10-21
Attending: INTERNAL MEDICINE

## 2022-10-21 ENCOUNTER — OFFICE VISIT (OUTPATIENT)
Dept: HEMATOLOGY/ONCOLOGY | Age: 77
End: 2022-10-21
Attending: INTERNAL MEDICINE

## 2022-10-21 ENCOUNTER — NURSE ONLY (OUTPATIENT)
Dept: HEMATOLOGY/ONCOLOGY | Age: 77
End: 2022-10-21
Attending: INTERNAL MEDICINE

## 2022-10-21 VITALS
DIASTOLIC BLOOD PRESSURE: 69 MMHG | TEMPERATURE: 97.2 F | WEIGHT: 293 LBS | OXYGEN SATURATION: 94 % | BODY MASS INDEX: 43.4 KG/M2 | HEART RATE: 104 BPM | SYSTOLIC BLOOD PRESSURE: 131 MMHG | HEIGHT: 69 IN

## 2022-10-21 DIAGNOSIS — C50.312 MALIGNANT NEOPLASM OF LOWER-INNER QUADRANT OF LEFT BREAST IN FEMALE, ESTROGEN RECEPTOR POSITIVE (CMD): ICD-10-CM

## 2022-10-21 DIAGNOSIS — M85.80 OSTEOPENIA, UNSPECIFIED LOCATION: Primary | ICD-10-CM

## 2022-10-21 DIAGNOSIS — Z17.0 MALIGNANT NEOPLASM OF LOWER-INNER QUADRANT OF LEFT BREAST IN FEMALE, ESTROGEN RECEPTOR POSITIVE (CMD): Primary | ICD-10-CM

## 2022-10-21 DIAGNOSIS — C50.312 MALIGNANT NEOPLASM OF LOWER-INNER QUADRANT OF LEFT BREAST IN FEMALE, ESTROGEN RECEPTOR POSITIVE (CMD): Primary | ICD-10-CM

## 2022-10-21 DIAGNOSIS — Z17.0 MALIGNANT NEOPLASM OF LOWER-INNER QUADRANT OF LEFT BREAST IN FEMALE, ESTROGEN RECEPTOR POSITIVE (CMD): ICD-10-CM

## 2022-10-21 LAB
ALBUMIN SERPL-MCNC: 3.6 G/DL (ref 3.6–5.1)
ALBUMIN/GLOB SERPL: 0.9 {RATIO} (ref 1–2.4)
ALP SERPL-CCNC: 86 UNITS/L (ref 45–117)
ALT SERPL-CCNC: 30 UNITS/L
ANION GAP SERPL CALC-SCNC: 12 MMOL/L (ref 7–19)
AST SERPL-CCNC: 16 UNITS/L
BASOPHILS # BLD: 0 K/MCL (ref 0–0.3)
BASOPHILS NFR BLD: 0 %
BILIRUB SERPL-MCNC: 0.5 MG/DL (ref 0.2–1)
BUN SERPL-MCNC: 18 MG/DL (ref 6–20)
BUN/CREAT SERPL: 20 (ref 7–25)
CALCIUM SERPL-MCNC: 10.8 MG/DL (ref 8.4–10.2)
CEA SERPL-MCNC: <2 NG/ML (ref 0–5)
CHLORIDE SERPL-SCNC: 105 MMOL/L (ref 97–110)
CO2 SERPL-SCNC: 29 MMOL/L (ref 21–32)
CREAT SERPL-MCNC: 0.88 MG/DL (ref 0.51–0.95)
DEPRECATED RDW RBC: 57 FL (ref 39–50)
EOSINOPHIL # BLD: 0.1 K/MCL (ref 0–0.5)
EOSINOPHIL NFR BLD: 1 %
ERYTHROCYTE [DISTWIDTH] IN BLOOD: 15.9 % (ref 11–15)
FASTING DURATION TIME PATIENT: ABNORMAL H
GFR SERPLBLD BASED ON 1.73 SQ M-ARVRAT: 68 ML/MIN
GLOBULIN SER-MCNC: 3.9 G/DL (ref 2–4)
GLUCOSE SERPL-MCNC: 234 MG/DL (ref 70–99)
HCT VFR BLD CALC: 50.2 % (ref 36–46.5)
HGB BLD-MCNC: 15.7 G/DL (ref 12–15.5)
IMM GRANULOCYTES # BLD AUTO: 0.1 K/MCL (ref 0–0.2)
IMM GRANULOCYTES # BLD: 1 %
LYMPHOCYTES # BLD: 1.5 K/MCL (ref 1–4)
LYMPHOCYTES NFR BLD: 15 %
MCH RBC QN AUTO: 30 PG (ref 26–34)
MCHC RBC AUTO-ENTMCNC: 31.3 G/DL (ref 32–36.5)
MCV RBC AUTO: 95.8 FL (ref 78–100)
MONOCYTES # BLD: 0.6 K/MCL (ref 0.3–0.9)
MONOCYTES NFR BLD: 6 %
NEUTROPHILS # BLD: 7.7 K/MCL (ref 1.8–7.7)
NEUTROPHILS NFR BLD: 77 %
NRBC BLD MANUAL-RTO: 0 /100 WBC
PLATELET # BLD AUTO: 218 K/MCL (ref 140–450)
POTASSIUM SERPL-SCNC: 4.3 MMOL/L (ref 3.4–5.1)
PROT SERPL-MCNC: 7.5 G/DL (ref 6.4–8.2)
RBC # BLD: 5.24 MIL/MCL (ref 4–5.2)
SODIUM SERPL-SCNC: 142 MMOL/L (ref 135–145)
WBC # BLD: 10 K/MCL (ref 4.2–11)

## 2022-10-21 PROCEDURE — 80053 COMPREHEN METABOLIC PANEL: CPT | Performed by: INTERNAL MEDICINE

## 2022-10-21 PROCEDURE — 82378 CARCINOEMBRYONIC ANTIGEN: CPT | Performed by: INTERNAL MEDICINE

## 2022-10-21 PROCEDURE — 10002800 HB RX 250 W HCPCS: Performed by: INTERNAL MEDICINE

## 2022-10-21 PROCEDURE — 96372 THER/PROPH/DIAG INJ SC/IM: CPT

## 2022-10-21 PROCEDURE — 86300 IMMUNOASSAY TUMOR CA 15-3: CPT | Performed by: INTERNAL MEDICINE

## 2022-10-21 PROCEDURE — 85025 COMPLETE CBC W/AUTO DIFF WBC: CPT | Performed by: INTERNAL MEDICINE

## 2022-10-21 PROCEDURE — 99215 OFFICE O/P EST HI 40 MIN: CPT | Performed by: INTERNAL MEDICINE

## 2022-10-21 RX ADMIN — DENOSUMAB 60 MG: 60 INJECTION SUBCUTANEOUS at 14:01

## 2022-10-21 ASSESSMENT — PATIENT HEALTH QUESTIONNAIRE - PHQ9
CLINICAL INTERPRETATION OF PHQ2 SCORE: NO FURTHER SCREENING NEEDED
SUM OF ALL RESPONSES TO PHQ9 QUESTIONS 1 AND 2: 0
2. FEELING DOWN, DEPRESSED OR HOPELESS: NOT AT ALL
SUM OF ALL RESPONSES TO PHQ9 QUESTIONS 1 AND 2: 0
1. LITTLE INTEREST OR PLEASURE IN DOING THINGS: NOT AT ALL

## 2022-10-21 ASSESSMENT — ENCOUNTER SYMPTOMS
DIZZINESS: 0
DIARRHEA: 0
VOMITING: 0
CONFUSION: 0
DIAPHORESIS: 0
ADENOPATHY: 0
ABDOMINAL PAIN: 0
CONSTIPATION: 0
CHOKING: 0
WHEEZING: 0
CHILLS: 0
BLOOD IN STOOL: 0
UNEXPECTED WEIGHT CHANGE: 0
APNEA: 0
VOICE CHANGE: 0
BRUISES/BLEEDS EASILY: 0
BACK PAIN: 0
APPETITE CHANGE: 0
NAUSEA: 0
LIGHT-HEADEDNESS: 0
FEVER: 0
SPEECH DIFFICULTY: 0
TROUBLE SWALLOWING: 0
ABDOMINAL DISTENTION: 0
ACTIVITY CHANGE: 0
SLEEP DISTURBANCE: 0
SHORTNESS OF BREATH: 0
COUGH: 0
CHEST TIGHTNESS: 0
FATIGUE: 0
WEAKNESS: 0
HEADACHES: 0

## 2022-10-21 ASSESSMENT — PAIN SCALES - GENERAL: PAINLEVEL: 0

## 2022-10-22 LAB
CANCER AG15-3 SERPL-ACNC: 12 UNITS/ML (ref 0–32)
CANCER AG27-29 SERPL-ACNC: 14.4 UNITS/ML (ref 0–40)

## 2023-03-24 LAB — AMB EXT MALB URINE CALC: 8 MG/24HR

## 2023-04-20 ENCOUNTER — CLINICAL ABSTRACT (OUTPATIENT)
Dept: INFUSION THERAPY | Age: 78
End: 2023-04-20

## 2023-04-20 ENCOUNTER — TELEPHONE (OUTPATIENT)
Dept: HEMATOLOGY/ONCOLOGY | Age: 78
End: 2023-04-20

## 2023-04-20 DIAGNOSIS — Z17.0 MALIGNANT NEOPLASM OF LOWER-INNER QUADRANT OF LEFT BREAST IN FEMALE, ESTROGEN RECEPTOR POSITIVE (CMD): Primary | ICD-10-CM

## 2023-04-20 DIAGNOSIS — M85.80 OSTEOPENIA, UNSPECIFIED LOCATION: Primary | ICD-10-CM

## 2023-04-20 DIAGNOSIS — C50.312 MALIGNANT NEOPLASM OF LOWER-INNER QUADRANT OF LEFT BREAST IN FEMALE, ESTROGEN RECEPTOR POSITIVE (CMD): Primary | ICD-10-CM

## 2023-04-20 RX ORDER — METHYLPREDNISOLONE SODIUM SUCCINATE 125 MG/2ML
125 INJECTION, POWDER, LYOPHILIZED, FOR SOLUTION INTRAMUSCULAR; INTRAVENOUS
OUTPATIENT
Start: 2023-04-21

## 2023-04-20 RX ORDER — FAMOTIDINE 10 MG/ML
20 INJECTION, SOLUTION INTRAVENOUS
OUTPATIENT
Start: 2023-04-21

## 2023-04-20 RX ORDER — DIPHENHYDRAMINE HYDROCHLORIDE 50 MG/ML
50 INJECTION INTRAMUSCULAR; INTRAVENOUS
Start: 2023-04-21

## 2023-04-20 RX ORDER — ALBUTEROL SULFATE 90 UG/1
2 AEROSOL, METERED RESPIRATORY (INHALATION)
OUTPATIENT
Start: 2023-04-21

## 2023-04-21 ENCOUNTER — APPOINTMENT (OUTPATIENT)
Dept: HEMATOLOGY/ONCOLOGY | Age: 78
End: 2023-04-21
Attending: INTERNAL MEDICINE

## 2023-04-21 ENCOUNTER — OFFICE VISIT (OUTPATIENT)
Dept: HEMATOLOGY/ONCOLOGY | Age: 78
End: 2023-04-21
Attending: INTERNAL MEDICINE

## 2023-04-21 ENCOUNTER — HOSPITAL ENCOUNTER (OUTPATIENT)
Dept: LAB | Age: 78
Discharge: STILL ADMITTED OR ADMINISTRATIVE | End: 2023-04-21
Attending: INTERNAL MEDICINE

## 2023-04-21 VITALS
OXYGEN SATURATION: 96 % | SYSTOLIC BLOOD PRESSURE: 101 MMHG | RESPIRATION RATE: 16 BRPM | HEIGHT: 69 IN | BODY MASS INDEX: 42.67 KG/M2 | WEIGHT: 288.1 LBS | TEMPERATURE: 97.6 F | HEART RATE: 88 BPM | DIASTOLIC BLOOD PRESSURE: 56 MMHG

## 2023-04-21 DIAGNOSIS — Z17.0 MALIGNANT NEOPLASM OF LOWER-INNER QUADRANT OF LEFT BREAST IN FEMALE, ESTROGEN RECEPTOR POSITIVE (CMD): ICD-10-CM

## 2023-04-21 DIAGNOSIS — M81.8 OSTEOPOROSIS DUE TO AROMATASE INHIBITOR: Primary | ICD-10-CM

## 2023-04-21 DIAGNOSIS — Z12.31 ENCOUNTER FOR SCREENING MAMMOGRAM FOR MALIGNANT NEOPLASM OF BREAST: ICD-10-CM

## 2023-04-21 DIAGNOSIS — C50.312 MALIGNANT NEOPLASM OF LOWER-INNER QUADRANT OF LEFT BREAST IN FEMALE, ESTROGEN RECEPTOR POSITIVE (CMD): ICD-10-CM

## 2023-04-21 DIAGNOSIS — T38.6X5A OSTEOPOROSIS DUE TO AROMATASE INHIBITOR: Primary | ICD-10-CM

## 2023-04-21 LAB
ALBUMIN SERPL-MCNC: 3.3 G/DL (ref 3.6–5.1)
ALBUMIN/GLOB SERPL: 0.8 {RATIO} (ref 1–2.4)
ALP SERPL-CCNC: 82 UNITS/L (ref 45–117)
ALT SERPL-CCNC: 21 UNITS/L
ANION GAP SERPL CALC-SCNC: 7 MMOL/L (ref 7–19)
AST SERPL-CCNC: 17 UNITS/L
BASOPHILS # BLD: 0 K/MCL (ref 0–0.3)
BASOPHILS NFR BLD: 1 %
BILIRUB SERPL-MCNC: 0.4 MG/DL (ref 0.2–1)
BUN SERPL-MCNC: 26 MG/DL (ref 6–20)
BUN/CREAT SERPL: 31 (ref 7–25)
CALCIUM SERPL-MCNC: 9.8 MG/DL (ref 8.4–10.2)
CEA SERPL-MCNC: <2 NG/ML (ref 0–5)
CHLORIDE SERPL-SCNC: 108 MMOL/L (ref 97–110)
CO2 SERPL-SCNC: 28 MMOL/L (ref 21–32)
CREAT SERPL-MCNC: 0.85 MG/DL (ref 0.51–0.95)
DEPRECATED RDW RBC: 51.2 FL (ref 39–50)
EOSINOPHIL # BLD: 0.2 K/MCL (ref 0–0.5)
EOSINOPHIL NFR BLD: 2 %
ERYTHROCYTE [DISTWIDTH] IN BLOOD: 14.5 % (ref 11–15)
FASTING DURATION TIME PATIENT: ABNORMAL H
GFR SERPLBLD BASED ON 1.73 SQ M-ARVRAT: 70 ML/MIN
GLOBULIN SER-MCNC: 4 G/DL (ref 2–4)
GLUCOSE SERPL-MCNC: 206 MG/DL (ref 70–99)
HCT VFR BLD CALC: 45 % (ref 36–46.5)
HGB BLD-MCNC: 14.2 G/DL (ref 12–15.5)
IMM GRANULOCYTES # BLD AUTO: 0 K/MCL (ref 0–0.2)
IMM GRANULOCYTES # BLD: 0 %
LYMPHOCYTES # BLD: 1.6 K/MCL (ref 1–4)
LYMPHOCYTES NFR BLD: 19 %
MCH RBC QN AUTO: 30.1 PG (ref 26–34)
MCHC RBC AUTO-ENTMCNC: 31.6 G/DL (ref 32–36.5)
MCV RBC AUTO: 95.5 FL (ref 78–100)
MONOCYTES # BLD: 0.6 K/MCL (ref 0.3–0.9)
MONOCYTES NFR BLD: 7 %
NEUTROPHILS # BLD: 6.3 K/MCL (ref 1.8–7.7)
NEUTROPHILS NFR BLD: 71 %
NRBC BLD MANUAL-RTO: 0 /100 WBC
PLATELET # BLD AUTO: 209 K/MCL (ref 140–450)
POTASSIUM SERPL-SCNC: 4.3 MMOL/L (ref 3.4–5.1)
PROT SERPL-MCNC: 7.3 G/DL (ref 6.4–8.2)
RBC # BLD: 4.71 MIL/MCL (ref 4–5.2)
SODIUM SERPL-SCNC: 139 MMOL/L (ref 135–145)
WBC # BLD: 8.7 K/MCL (ref 4.2–11)

## 2023-04-21 PROCEDURE — 36415 COLL VENOUS BLD VENIPUNCTURE: CPT | Performed by: INTERNAL MEDICINE

## 2023-04-21 PROCEDURE — 80053 COMPREHEN METABOLIC PANEL: CPT | Performed by: INTERNAL MEDICINE

## 2023-04-21 PROCEDURE — 85025 COMPLETE CBC W/AUTO DIFF WBC: CPT | Performed by: INTERNAL MEDICINE

## 2023-04-21 PROCEDURE — 99215 OFFICE O/P EST HI 40 MIN: CPT | Performed by: INTERNAL MEDICINE

## 2023-04-21 PROCEDURE — 86300 IMMUNOASSAY TUMOR CA 15-3: CPT | Performed by: INTERNAL MEDICINE

## 2023-04-21 PROCEDURE — 99211 OFF/OP EST MAY X REQ PHY/QHP: CPT

## 2023-04-21 PROCEDURE — 82378 CARCINOEMBRYONIC ANTIGEN: CPT | Performed by: INTERNAL MEDICINE

## 2023-04-21 ASSESSMENT — ENCOUNTER SYMPTOMS
VOMITING: 0
SLEEP DISTURBANCE: 0
LIGHT-HEADEDNESS: 0
ABDOMINAL PAIN: 0
DIAPHORESIS: 0
WHEEZING: 0
DIARRHEA: 0
ADENOPATHY: 0
SPEECH DIFFICULTY: 0
CONFUSION: 0
CHILLS: 0
VOICE CHANGE: 0
BRUISES/BLEEDS EASILY: 0
HEADACHES: 0
DIZZINESS: 0
TROUBLE SWALLOWING: 0
FATIGUE: 0
COUGH: 0
APPETITE CHANGE: 0
FEVER: 0
WEAKNESS: 0
CHEST TIGHTNESS: 0
CONSTIPATION: 0
ABDOMINAL DISTENTION: 0
UNEXPECTED WEIGHT CHANGE: 0
ACTIVITY CHANGE: 0
BACK PAIN: 0
CHOKING: 0
BLOOD IN STOOL: 0
SHORTNESS OF BREATH: 0
APNEA: 0
NAUSEA: 0

## 2023-04-21 ASSESSMENT — PATIENT HEALTH QUESTIONNAIRE - PHQ9
SUM OF ALL RESPONSES TO PHQ9 QUESTIONS 1 AND 2: 0
1. LITTLE INTEREST OR PLEASURE IN DOING THINGS: NOT AT ALL
SUM OF ALL RESPONSES TO PHQ9 QUESTIONS 1 AND 2: 0
CLINICAL INTERPRETATION OF PHQ2 SCORE: NO FURTHER SCREENING NEEDED
2. FEELING DOWN, DEPRESSED OR HOPELESS: NOT AT ALL

## 2023-04-21 ASSESSMENT — PAIN SCALES - GENERAL: PAINLEVEL: 0

## 2023-04-22 LAB
CANCER AG15-3 SERPL-ACNC: 12 UNITS/ML (ref 0–32)
CANCER AG27-29 SERPL-ACNC: 17.3 UNITS/ML (ref 0–40)

## 2023-09-21 ENCOUNTER — OFFICE VISIT (OUTPATIENT)
Dept: INTERNAL MEDICINE CLINIC | Facility: CLINIC | Age: 78
End: 2023-09-21
Payer: COMMERCIAL

## 2023-09-21 VITALS
SYSTOLIC BLOOD PRESSURE: 130 MMHG | HEIGHT: 68.5 IN | WEIGHT: 290.19 LBS | OXYGEN SATURATION: 96 % | RESPIRATION RATE: 22 BRPM | DIASTOLIC BLOOD PRESSURE: 78 MMHG | BODY MASS INDEX: 43.48 KG/M2 | HEART RATE: 93 BPM

## 2023-09-21 DIAGNOSIS — I10 ESSENTIAL HYPERTENSION WITH GOAL BLOOD PRESSURE LESS THAN 140/90: ICD-10-CM

## 2023-09-21 DIAGNOSIS — N18.32 TYPE 2 DIABETES MELLITUS WITH STAGE 3B CHRONIC KIDNEY DISEASE, WITH LONG-TERM CURRENT USE OF INSULIN (HCC): ICD-10-CM

## 2023-09-21 DIAGNOSIS — E04.2 MULTIPLE THYROID NODULES: ICD-10-CM

## 2023-09-21 DIAGNOSIS — E89.2 S/P SUBTOTAL PARATHYROIDECTOMY (HCC): ICD-10-CM

## 2023-09-21 DIAGNOSIS — Z12.11 SCREENING FOR COLON CANCER: ICD-10-CM

## 2023-09-21 DIAGNOSIS — G47.33 OSA (OBSTRUCTIVE SLEEP APNEA): ICD-10-CM

## 2023-09-21 DIAGNOSIS — E78.5 HYPERLIPIDEMIA, UNSPECIFIED HYPERLIPIDEMIA TYPE: ICD-10-CM

## 2023-09-21 DIAGNOSIS — E11.22 TYPE 2 DIABETES MELLITUS WITH STAGE 3B CHRONIC KIDNEY DISEASE, WITH LONG-TERM CURRENT USE OF INSULIN (HCC): ICD-10-CM

## 2023-09-21 DIAGNOSIS — Z85.3 HISTORY OF BREAST CANCER IN FEMALE: Primary | ICD-10-CM

## 2023-09-21 DIAGNOSIS — Z79.4 TYPE 2 DIABETES MELLITUS WITH STAGE 3B CHRONIC KIDNEY DISEASE, WITH LONG-TERM CURRENT USE OF INSULIN (HCC): ICD-10-CM

## 2023-09-21 DIAGNOSIS — Z86.010 HISTORY OF ADENOMATOUS POLYP OF COLON: ICD-10-CM

## 2023-09-21 DIAGNOSIS — I50.32 CHRONIC DIASTOLIC CHF (CONGESTIVE HEART FAILURE) (HCC): ICD-10-CM

## 2023-09-21 DIAGNOSIS — Z96.653 STATUS POST BILATERAL KNEE REPLACEMENTS: ICD-10-CM

## 2023-09-21 DIAGNOSIS — Z98.890 S/P LUMPECTOMY, LEFT BREAST: ICD-10-CM

## 2023-09-21 DIAGNOSIS — Z85.42 HISTORY OF ENDOMETRIAL CANCER: ICD-10-CM

## 2023-09-21 PROCEDURE — 1159F MED LIST DOCD IN RCRD: CPT | Performed by: FAMILY MEDICINE

## 2023-09-21 PROCEDURE — 99203 OFFICE O/P NEW LOW 30 MIN: CPT | Performed by: FAMILY MEDICINE

## 2023-09-21 PROCEDURE — 1160F RVW MEDS BY RX/DR IN RCRD: CPT | Performed by: FAMILY MEDICINE

## 2023-09-21 PROCEDURE — 3008F BODY MASS INDEX DOCD: CPT | Performed by: FAMILY MEDICINE

## 2023-09-21 PROCEDURE — 3075F SYST BP GE 130 - 139MM HG: CPT | Performed by: FAMILY MEDICINE

## 2023-09-21 PROCEDURE — 3078F DIAST BP <80 MM HG: CPT | Performed by: FAMILY MEDICINE

## 2023-09-21 PROCEDURE — 1170F FXNL STATUS ASSESSED: CPT | Performed by: FAMILY MEDICINE

## 2023-09-21 PROCEDURE — 90662 IIV NO PRSV INCREASED AG IM: CPT | Performed by: FAMILY MEDICINE

## 2023-09-21 PROCEDURE — G0008 ADMIN INFLUENZA VIRUS VAC: HCPCS | Performed by: FAMILY MEDICINE

## 2023-09-21 RX ORDER — EMPAGLIFLOZIN 25 MG/1
TABLET, FILM COATED ORAL
COMMUNITY

## 2023-09-21 RX ORDER — TORSEMIDE 5 MG/1
5 TABLET ORAL DAILY
COMMUNITY

## 2023-09-21 RX ORDER — LISINOPRIL 10 MG/1
10 TABLET ORAL 2 TIMES DAILY
COMMUNITY

## 2023-09-21 RX ORDER — INSULIN GLARGINE 300 U/ML
50 INJECTION, SOLUTION SUBCUTANEOUS DAILY
Qty: 6 ML | Refills: 0 | Status: SHIPPED | OUTPATIENT
Start: 2023-09-21 | End: 2023-12-20

## 2023-10-18 ENCOUNTER — LAB ENCOUNTER (OUTPATIENT)
Dept: LAB | Age: 78
End: 2023-10-18
Attending: FAMILY MEDICINE
Payer: MEDICARE

## 2023-10-18 DIAGNOSIS — N18.32 TYPE 2 DIABETES MELLITUS WITH STAGE 3B CHRONIC KIDNEY DISEASE, WITH LONG-TERM CURRENT USE OF INSULIN (HCC): ICD-10-CM

## 2023-10-18 DIAGNOSIS — E11.22 TYPE 2 DIABETES MELLITUS WITH STAGE 3B CHRONIC KIDNEY DISEASE, WITH LONG-TERM CURRENT USE OF INSULIN (HCC): ICD-10-CM

## 2023-10-18 DIAGNOSIS — Z79.4 TYPE 2 DIABETES MELLITUS WITH STAGE 3B CHRONIC KIDNEY DISEASE, WITH LONG-TERM CURRENT USE OF INSULIN (HCC): ICD-10-CM

## 2023-10-18 LAB
ALBUMIN SERPL-MCNC: 3.3 G/DL (ref 3.4–5)
ALBUMIN/GLOB SERPL: 0.8 {RATIO} (ref 1–2)
ALP LIVER SERPL-CCNC: 96 U/L
ALT SERPL-CCNC: 24 U/L
ANION GAP SERPL CALC-SCNC: 3 MMOL/L (ref 0–18)
AST SERPL-CCNC: 15 U/L (ref 15–37)
BILIRUB SERPL-MCNC: 0.6 MG/DL (ref 0.1–2)
BUN BLD-MCNC: 21 MG/DL (ref 7–18)
CALCIUM BLD-MCNC: 10 MG/DL (ref 8.5–10.1)
CHLORIDE SERPL-SCNC: 110 MMOL/L (ref 98–112)
CHOLEST SERPL-MCNC: 145 MG/DL (ref ?–200)
CO2 SERPL-SCNC: 28 MMOL/L (ref 21–32)
CREAT BLD-MCNC: 1.38 MG/DL
EGFRCR SERPLBLD CKD-EPI 2021: 39 ML/MIN/1.73M2 (ref 60–?)
EST. AVERAGE GLUCOSE BLD GHB EST-MCNC: 197 MG/DL (ref 68–126)
FASTING PATIENT LIPID ANSWER: YES
FASTING STATUS PATIENT QL REPORTED: YES
GLOBULIN PLAS-MCNC: 4.2 G/DL (ref 2.8–4.4)
GLUCOSE BLD-MCNC: 146 MG/DL (ref 70–99)
HBA1C MFR BLD: 8.5 % (ref ?–5.7)
HDLC SERPL-MCNC: 44 MG/DL (ref 40–59)
LDLC SERPL CALC-MCNC: 76 MG/DL (ref ?–100)
NONHDLC SERPL-MCNC: 101 MG/DL (ref ?–130)
OSMOLALITY SERPL CALC.SUM OF ELEC: 298 MOSM/KG (ref 275–295)
POTASSIUM SERPL-SCNC: 4.4 MMOL/L (ref 3.5–5.1)
PROT SERPL-MCNC: 7.5 G/DL (ref 6.4–8.2)
SODIUM SERPL-SCNC: 141 MMOL/L (ref 136–145)
TRIGL SERPL-MCNC: 146 MG/DL (ref 30–149)
VLDLC SERPL CALC-MCNC: 23 MG/DL (ref 0–30)

## 2023-10-18 PROCEDURE — 83036 HEMOGLOBIN GLYCOSYLATED A1C: CPT

## 2023-10-18 PROCEDURE — 36415 COLL VENOUS BLD VENIPUNCTURE: CPT

## 2023-10-18 PROCEDURE — 80061 LIPID PANEL: CPT

## 2023-10-18 PROCEDURE — 80053 COMPREHEN METABOLIC PANEL: CPT

## 2023-10-23 ENCOUNTER — TELEPHONE (OUTPATIENT)
Dept: INTERNAL MEDICINE CLINIC | Facility: CLINIC | Age: 78
End: 2023-10-23

## 2023-10-23 ENCOUNTER — OFFICE VISIT (OUTPATIENT)
Dept: INTERNAL MEDICINE CLINIC | Facility: CLINIC | Age: 78
End: 2023-10-23

## 2023-10-23 VITALS
TEMPERATURE: 97 F | DIASTOLIC BLOOD PRESSURE: 84 MMHG | BODY MASS INDEX: 43.9 KG/M2 | SYSTOLIC BLOOD PRESSURE: 122 MMHG | HEART RATE: 94 BPM | HEIGHT: 68.5 IN | WEIGHT: 293 LBS

## 2023-10-23 DIAGNOSIS — E11.22 TYPE 2 DIABETES MELLITUS WITH STAGE 3B CHRONIC KIDNEY DISEASE, WITH LONG-TERM CURRENT USE OF INSULIN (HCC): Primary | ICD-10-CM

## 2023-10-23 DIAGNOSIS — Z79.4 TYPE 2 DIABETES MELLITUS WITH STAGE 3B CHRONIC KIDNEY DISEASE, WITH LONG-TERM CURRENT USE OF INSULIN (HCC): Primary | ICD-10-CM

## 2023-10-23 DIAGNOSIS — N18.32 TYPE 2 DIABETES MELLITUS WITH STAGE 3B CHRONIC KIDNEY DISEASE, WITH LONG-TERM CURRENT USE OF INSULIN (HCC): Primary | ICD-10-CM

## 2023-10-23 DIAGNOSIS — I10 ESSENTIAL HYPERTENSION WITH GOAL BLOOD PRESSURE LESS THAN 140/90: ICD-10-CM

## 2023-10-23 DIAGNOSIS — E78.5 HYPERLIPIDEMIA, UNSPECIFIED HYPERLIPIDEMIA TYPE: ICD-10-CM

## 2023-10-23 PROCEDURE — 99214 OFFICE O/P EST MOD 30 MIN: CPT | Performed by: FAMILY MEDICINE

## 2023-10-23 PROCEDURE — 1159F MED LIST DOCD IN RCRD: CPT | Performed by: FAMILY MEDICINE

## 2023-10-23 PROCEDURE — 3008F BODY MASS INDEX DOCD: CPT | Performed by: FAMILY MEDICINE

## 2023-10-23 PROCEDURE — 1160F RVW MEDS BY RX/DR IN RCRD: CPT | Performed by: FAMILY MEDICINE

## 2023-10-23 PROCEDURE — 3074F SYST BP LT 130 MM HG: CPT | Performed by: FAMILY MEDICINE

## 2023-10-23 PROCEDURE — 3079F DIAST BP 80-89 MM HG: CPT | Performed by: FAMILY MEDICINE

## 2023-10-23 RX ORDER — LIRAGLUTIDE 6 MG/ML
1.8 INJECTION SUBCUTANEOUS DAILY
Qty: 9 EACH | Refills: 3 | Status: SHIPPED | OUTPATIENT
Start: 2023-10-23 | End: 2024-01-21

## 2023-10-23 RX ORDER — ATORVASTATIN CALCIUM 40 MG/1
40 TABLET, FILM COATED ORAL NIGHTLY
Qty: 90 TABLET | Refills: 3 | Status: SHIPPED | OUTPATIENT
Start: 2023-10-23

## 2023-10-23 RX ORDER — INSULIN GLARGINE 300 U/ML
60 INJECTION, SOLUTION SUBCUTANEOUS DAILY
Qty: 18 ML | Refills: 1 | Status: SHIPPED | OUTPATIENT
Start: 2023-10-23 | End: 2024-01-21

## 2023-10-23 RX ORDER — EMPAGLIFLOZIN 25 MG/1
1 TABLET, FILM COATED ORAL DAILY
Qty: 90 TABLET | Refills: 3 | Status: SHIPPED | OUTPATIENT
Start: 2023-10-23

## 2023-10-23 RX ORDER — LISINOPRIL 10 MG/1
10 TABLET ORAL 2 TIMES DAILY
Qty: 90 TABLET | Refills: 3 | Status: SHIPPED | OUTPATIENT
Start: 2023-10-23

## 2023-10-23 RX ORDER — TORSEMIDE 5 MG/1
5 TABLET ORAL DAILY
Qty: 90 TABLET | Refills: 3 | Status: SHIPPED | OUTPATIENT
Start: 2023-10-23

## 2023-10-23 NOTE — TELEPHONE ENCOUNTER
Future Appointments   Date Time Provider Department Center   3/28/2024  9:00 AM Hans Albarran MD [facility]     Informed must fast no call back required. Orders to [facility]

## 2023-10-30 ENCOUNTER — PATIENT MESSAGE (OUTPATIENT)
Dept: INTERNAL MEDICINE CLINIC | Facility: CLINIC | Age: 78
End: 2023-10-30

## 2023-10-30 NOTE — TELEPHONE ENCOUNTER
Discussed with patient. Glycemic control improving. She will continue treatment and provide update in 1-2 weeks with readings.

## 2023-10-30 NOTE — TELEPHONE ENCOUNTER
From: Howard Marie  To: Ron Alcazar  Sent: 10/30/2023 10:27 AM CDT  Subject: Blood sugar results for last week. Here are the results starting with 10/24: 167, 149, 147, 150, 145, 134, 121. Also I have an appointment for a mammogram and a dexa scan 10/31 at [address] [place] [address].

## 2023-10-31 ENCOUNTER — HOSPITAL ENCOUNTER (OUTPATIENT)
Dept: GENERAL RADIOLOGY | Age: 78
Discharge: HOME | End: 2023-10-31
Attending: INTERNAL MEDICINE

## 2023-10-31 ENCOUNTER — HOSPITAL ENCOUNTER (OUTPATIENT)
Dept: MAMMOGRAPHY | Age: 78
Discharge: HOME | End: 2023-10-31
Attending: INTERNAL MEDICINE

## 2023-10-31 DIAGNOSIS — M81.8 OSTEOPOROSIS DUE TO AROMATASE INHIBITOR: ICD-10-CM

## 2023-10-31 DIAGNOSIS — T38.6X5A OSTEOPOROSIS DUE TO AROMATASE INHIBITOR: ICD-10-CM

## 2023-10-31 DIAGNOSIS — Z12.31 ENCOUNTER FOR SCREENING MAMMOGRAM FOR MALIGNANT NEOPLASM OF BREAST: ICD-10-CM

## 2023-10-31 PROCEDURE — 77063 BREAST TOMOSYNTHESIS BI: CPT

## 2023-10-31 PROCEDURE — 77080 DXA BONE DENSITY AXIAL: CPT

## 2024-01-09 ENCOUNTER — OFFICE VISIT (OUTPATIENT)
Dept: INTERNAL MEDICINE CLINIC | Facility: CLINIC | Age: 79
End: 2024-01-09
Payer: COMMERCIAL

## 2024-01-09 ENCOUNTER — LAB ENCOUNTER (OUTPATIENT)
Dept: LAB | Age: 79
End: 2024-01-09
Attending: FAMILY MEDICINE
Payer: MEDICARE

## 2024-01-09 VITALS
SYSTOLIC BLOOD PRESSURE: 122 MMHG | TEMPERATURE: 97 F | DIASTOLIC BLOOD PRESSURE: 80 MMHG | BODY MASS INDEX: 43.4 KG/M2 | HEART RATE: 83 BPM | OXYGEN SATURATION: 96 % | RESPIRATION RATE: 18 BRPM | HEIGHT: 68.75 IN | WEIGHT: 293 LBS

## 2024-01-09 DIAGNOSIS — C50.912 MALIGNANT NEOPLASM OF LEFT FEMALE BREAST, UNSPECIFIED ESTROGEN RECEPTOR STATUS, UNSPECIFIED SITE OF BREAST (HCC): ICD-10-CM

## 2024-01-09 DIAGNOSIS — E78.5 HYPERLIPIDEMIA, UNSPECIFIED HYPERLIPIDEMIA TYPE: ICD-10-CM

## 2024-01-09 DIAGNOSIS — Z79.4 TYPE 2 DIABETES MELLITUS WITH STAGE 3B CHRONIC KIDNEY DISEASE, WITH LONG-TERM CURRENT USE OF INSULIN (HCC): Primary | ICD-10-CM

## 2024-01-09 DIAGNOSIS — I10 ESSENTIAL HYPERTENSION WITH GOAL BLOOD PRESSURE LESS THAN 140/90: ICD-10-CM

## 2024-01-09 DIAGNOSIS — N18.32 TYPE 2 DIABETES MELLITUS WITH STAGE 3B CHRONIC KIDNEY DISEASE, WITH LONG-TERM CURRENT USE OF INSULIN (HCC): ICD-10-CM

## 2024-01-09 DIAGNOSIS — E11.22 TYPE 2 DIABETES MELLITUS WITH STAGE 3B CHRONIC KIDNEY DISEASE, WITH LONG-TERM CURRENT USE OF INSULIN (HCC): Primary | ICD-10-CM

## 2024-01-09 DIAGNOSIS — E11.22 TYPE 2 DIABETES MELLITUS WITH STAGE 3B CHRONIC KIDNEY DISEASE, WITH LONG-TERM CURRENT USE OF INSULIN (HCC): ICD-10-CM

## 2024-01-09 DIAGNOSIS — Z79.4 TYPE 2 DIABETES MELLITUS WITH STAGE 3B CHRONIC KIDNEY DISEASE, WITH LONG-TERM CURRENT USE OF INSULIN (HCC): ICD-10-CM

## 2024-01-09 DIAGNOSIS — N18.32 TYPE 2 DIABETES MELLITUS WITH STAGE 3B CHRONIC KIDNEY DISEASE, WITH LONG-TERM CURRENT USE OF INSULIN (HCC): Primary | ICD-10-CM

## 2024-01-09 LAB
ALBUMIN SERPL-MCNC: 3.7 G/DL (ref 3.4–5)
ALBUMIN/GLOB SERPL: 0.9 {RATIO} (ref 1–2)
ALP LIVER SERPL-CCNC: 95 U/L
ALT SERPL-CCNC: 28 U/L
ANION GAP SERPL CALC-SCNC: 10 MMOL/L (ref 0–18)
AST SERPL-CCNC: 15 U/L (ref 15–37)
BILIRUB SERPL-MCNC: 0.7 MG/DL (ref 0.1–2)
BUN BLD-MCNC: 23 MG/DL (ref 9–23)
CALCIUM BLD-MCNC: 9.7 MG/DL (ref 8.5–10.1)
CHLORIDE SERPL-SCNC: 108 MMOL/L (ref 98–112)
CHOLEST SERPL-MCNC: 171 MG/DL (ref ?–200)
CO2 SERPL-SCNC: 25 MMOL/L (ref 21–32)
CREAT BLD-MCNC: 1.05 MG/DL
EGFRCR SERPLBLD CKD-EPI 2021: 54 ML/MIN/1.73M2 (ref 60–?)
EST. AVERAGE GLUCOSE BLD GHB EST-MCNC: 203 MG/DL (ref 68–126)
FASTING PATIENT LIPID ANSWER: NO
FASTING STATUS PATIENT QL REPORTED: NO
GLOBULIN PLAS-MCNC: 4.1 G/DL (ref 2.8–4.4)
GLUCOSE BLD-MCNC: 120 MG/DL (ref 70–99)
HBA1C MFR BLD: 8.7 % (ref ?–5.7)
HDLC SERPL-MCNC: 42 MG/DL (ref 40–59)
LDLC SERPL CALC-MCNC: 98 MG/DL (ref ?–100)
NONHDLC SERPL-MCNC: 129 MG/DL (ref ?–130)
OSMOLALITY SERPL CALC.SUM OF ELEC: 301 MOSM/KG (ref 275–295)
POTASSIUM SERPL-SCNC: 4 MMOL/L (ref 3.5–5.1)
PROT SERPL-MCNC: 7.8 G/DL (ref 6.4–8.2)
SODIUM SERPL-SCNC: 143 MMOL/L (ref 136–145)
TRIGL SERPL-MCNC: 179 MG/DL (ref 30–149)
VLDLC SERPL CALC-MCNC: 30 MG/DL (ref 0–30)

## 2024-01-09 PROCEDURE — 36415 COLL VENOUS BLD VENIPUNCTURE: CPT

## 2024-01-09 PROCEDURE — 1160F RVW MEDS BY RX/DR IN RCRD: CPT | Performed by: FAMILY MEDICINE

## 2024-01-09 PROCEDURE — 3074F SYST BP LT 130 MM HG: CPT | Performed by: FAMILY MEDICINE

## 2024-01-09 PROCEDURE — 3079F DIAST BP 80-89 MM HG: CPT | Performed by: FAMILY MEDICINE

## 2024-01-09 PROCEDURE — 99214 OFFICE O/P EST MOD 30 MIN: CPT | Performed by: FAMILY MEDICINE

## 2024-01-09 PROCEDURE — 80053 COMPREHEN METABOLIC PANEL: CPT

## 2024-01-09 PROCEDURE — 83036 HEMOGLOBIN GLYCOSYLATED A1C: CPT

## 2024-01-09 PROCEDURE — 1159F MED LIST DOCD IN RCRD: CPT | Performed by: FAMILY MEDICINE

## 2024-01-09 PROCEDURE — 3008F BODY MASS INDEX DOCD: CPT | Performed by: FAMILY MEDICINE

## 2024-01-09 PROCEDURE — 80061 LIPID PANEL: CPT

## 2024-01-09 RX ORDER — LISINOPRIL 10 MG/1
10 TABLET ORAL DAILY
Qty: 90 TABLET | Refills: 1 | Status: SHIPPED | OUTPATIENT
Start: 2024-01-09

## 2024-01-09 RX ORDER — LIRAGLUTIDE 6 MG/ML
1.8 INJECTION SUBCUTANEOUS DAILY
Qty: 9 EACH | Refills: 1 | Status: SHIPPED | OUTPATIENT
Start: 2024-01-09 | End: 2024-04-08

## 2024-01-09 NOTE — PROGRESS NOTES
Sasha Valdivia  2/4/1945    Chief Complaint   Patient presents with    Follow - Up     EJ RM 8- Pt is here for 3 mos f/u       HPI:   Sasha Valdivia is a 78 year old female who presents for follow-up. She is overall doing well. Her fasting blood sugars have been in the 115-140 since January. She has been trying to make dietary modifications and planning to restart exercise. Her mammogram was done in at the end of Oct and has follow-up with her oncologist in April. She has upcoming colonoscopy as well.     Current Outpatient Medications   Medication Sig Dispense Refill    insulin aspart 100 Units/mL Subcutaneous Solution Pen-injector Inject into the skin.      Insulin Glargine, 2 Unit Dial, (TOUJEO MAX SOLOSTAR) 300 UNIT/ML Subcutaneous Solution Pen-injector Inject 60 Units into the skin daily. 18 mL 1    lisinopril 10 MG Oral Tab Take 1 tablet (10 mg total) by mouth in the morning and 1 tablet (10 mg total) before bedtime. (Patient taking differently: Take 1 tablet (10 mg total) by mouth daily.) 90 tablet 3    Empagliflozin (JARDIANCE) 25 MG Oral Tab Take 1 tablet by mouth daily. 90 tablet 3    Liraglutide (VICTOZA) 18 MG/3ML Subcutaneous Solution Pen-injector Inject 1.8 mg into the skin daily. 9 each 3    metFORMIN HCl 1000 MG Oral Tab Take 1 tablet (1,000 mg total) by mouth 2 (two) times daily with meals. 180 tablet 3    atorvastatin (LIPITOR) 40 MG Oral Tab Take 1 tablet (40 mg total) by mouth nightly. 90 tablet 3    torsemide 5 MG Oral Tab Take 1 tablet (5 mg total) by mouth daily. 90 tablet 3    Insulin Pen Needle (BD PEN NEEDLE BRYSON U/F) 32G X 4 MM Does not apply Misc Use daily with Toujeo 90 each 3    OneTouch Delica Lancets 33G Does not apply Misc TEST BLOOD SUGAR DAILY Non-Insulin Dependent Diabetes Type II. E11.9. 100 each 3    Blood Glucose Monitoring Suppl (ONETOUCH ULTRA 2) w/Device Does not apply Kit TEST BLOOD SUGAR ONCE DAILY Non-Insulin Dependent Diabetes Type II. E11.9. 1 kit 0     Glucose Blood (ONETOUCH ULTRA) In Vitro Strip TEST BLOOD SUGAR ONCE DAILY Non-Insulin Dependent Diabetes Type II. E11.9. 100 strip 3    Calcium Carbonate-Vitamin D (CALCIUM + D OR) Take 1 tablet by mouth daily.      aspirin 81 MG Oral Tab Take 1 tablet (81 mg total) by mouth daily.      PEG 3350-KCl-Na Bicarb-NaCl 420 g Oral Recon Soln Take as directed by physician (Patient not taking: Reported on 1/9/2024) 4000 mL 0      Allergies   Allergen Reactions    Pcn [Penicillins] UNKNOWN     Childhood reaction      Past Medical History:   Diagnosis Date    Arthritis     Cancer (HCC)     breast    High blood pressure     High cholesterol     WIL (obstructive sleep apnea) 2/6/17-Split    AHI 92 RDI 95 REM AHI 0 Supine  non-supine AHI 70 Sao2 Seven 79%/CPAP-10cpw w/ 5L O2    Type II or unspecified type diabetes mellitus without mention of complication, not stated as uncontrolled     Unspecified essential hypertension     Visual impairment       Patient Active Problem List   Diagnosis    Multiple thyroid nodules    Vitamin D deficiency    Primary osteoarthritis of both knees    Pes planus of both feet    Primary osteoarthritis of both feet    Chronic gout without tophus, unspecified cause, unspecified site    S/P subtotal parathyroidectomy    Hyperlipidemia, unspecified hyperlipidemia type    History of adenomatous polyp of colon    Essential hypertension with goal blood pressure less than 140/90    Morbid obesity, unspecified obesity type (HCC)    Allergic rhinitis, unspecified seasonality, unspecified trigger    Malignant neoplasm of left female breast, unspecified estrogen receptor status, unspecified site of breast (HCC)    Osteopenia, unspecified location    WIL (obstructive sleep apnea)    Status post bilateral knee replacements    Chronic diastolic CHF (congestive heart failure) (HCC)    Stage 3b chronic kidney disease (HCC)    Type 2 diabetes mellitus with stage 3b chronic kidney disease, without long-term  current use of insulin (HCC)    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 and stage 3b chronic kidney disease (HCC)    Tortuous aorta (HCC)    Moderate pulmonary hypertension (HCC)      Past Surgical History:   Procedure Laterality Date    COLONOSCOPY N/A 2/14/2019    SSA, adenomas- repeat 3 yrs    COLONOSCOPY & POLYPECTOMY  8/15    polyps; tics; repeat 3 yrs (adenoma)    COLONOSCOPY,BIOPSY  8/19/2015    Procedure: ;  Surgeon: Ismael Vazquez MD;  Location: [facility]    COLONOSCOPY,REMV LESN,SNARE N/A 8/19/2015    Procedure: COLONOSCOPY, POSSIBLE BIOPSY, POSSIBLE POLYPECTOMY 43312;  Surgeon: Ismael Vazquez MD;  Location: [facility]    PARATHYROIDECTOMY      TOTAL KNEE REPLACEMENT Left 11/22/16    Dr Higgins    TOTAL KNEE REPLACEMENT Right 2/28/17    Dr Higgins      Family History   Problem Relation Age of Onset    Arthritis Mother     Diabetes Mother       Social History     Socioeconomic History    Marital status:    Tobacco Use    Smoking status: Never    Smokeless tobacco: Never   Vaping Use    Vaping Use: Never used   Substance and Sexual Activity    Alcohol use: Yes     Alcohol/week: 0.0 standard drinks of alcohol     Comment: once or twice a year    Drug use: No         REVIEW OF SYSTEMS:   GENERAL: feels well otherwise, see HPI    EXAM:   /80   Pulse 83   Temp 96.7 °F (35.9 °C) (Temporal)   Resp 18   Ht 5' 8.75\" (1.746 m)   Wt 296 lb (134.3 kg)   SpO2 96%   BMI 44.03 kg/m²   GENERAL: Well developed, well nourished,in no apparent distress  SKIN: No rash  EYES: PERRLA, EOMI, conjunctiva are clear  HEENT: atraumatic, normocephalic  NECK: supple  LUNGS: clear to auscultation  CARDIO: RRR  Bilateral barefoot skin diabetic exam is normal, visualized feet and the appearance is normal.  Bilateral monofilament/sensation of both feet is normal.  Pulsation pedal pulse exam of both lower legs/feet is normal as well.        ASSESSMENT AND PLAN:   Sasha Valdivia is a 78 year old female who presents for follow-up    Type 2 diabetes mellitus with stage 3b chronic kidney disease, with long-term current use of insulin (HCC)  Will continue current treatment for now and check labs today. We discussed medication management regarding her upcoming colonoscopy. Follow-up scheduled in March.   - Liraglutide (VICTOZA) 18 MG/3ML Subcutaneous Solution Pen-injector; Inject 1.8 mg into the skin daily.  Dispense: 9 each; Refill: 1  - CMP Now; Future  - Lipids Now; Future  - Hemoglobin A1C Now; Future  - Microalb/Creat Ratio, Random Urine Now; Future    Essential hypertension with goal blood pressure less than 140/90  Controlled on current treatment.   - lisinopril 10 MG Oral Tab; Take 1 tablet (10 mg total) by mouth daily.  Dispense: 90 tablet; Refill: 1    Hyperlipidemia, unspecified hyperlipidemia type  Continue statin, check lipid panel today.     Malignant neoplasm of left female breast  Mammogram completed in Oct. Has follow-up with oncologist in April.     All questions were answered and the patient agrees with the plan.     Thank you,  Hans Albarran MD

## 2024-01-12 ENCOUNTER — HOSPITAL ENCOUNTER (OUTPATIENT)
Facility: HOSPITAL | Age: 79
Setting detail: HOSPITAL OUTPATIENT SURGERY
Discharge: HOME | End: 2024-01-12
Attending: INTERNAL MEDICINE | Admitting: INTERNAL MEDICINE
Payer: MEDICARE

## 2024-01-12 ENCOUNTER — ANESTHESIA EVENT (OUTPATIENT)
Dept: ENDOSCOPY | Facility: HOSPITAL | Age: 79
End: 2024-01-12
Payer: MEDICARE

## 2024-01-12 ENCOUNTER — ANESTHESIA (OUTPATIENT)
Dept: ENDOSCOPY | Facility: HOSPITAL | Age: 79
End: 2024-01-12
Payer: MEDICARE

## 2024-01-12 VITALS
OXYGEN SATURATION: 95 % | RESPIRATION RATE: 20 BRPM | SYSTOLIC BLOOD PRESSURE: 131 MMHG | HEIGHT: 68.75 IN | TEMPERATURE: 97 F | BODY MASS INDEX: 42.95 KG/M2 | HEART RATE: 70 BPM | DIASTOLIC BLOOD PRESSURE: 79 MMHG | WEIGHT: 290 LBS

## 2024-01-12 DIAGNOSIS — Z86.010 PERSONAL HISTORY OF COLONIC POLYPS: ICD-10-CM

## 2024-01-12 LAB — GLUCOSE BLD-MCNC: 90 MG/DL (ref 70–99)

## 2024-01-12 PROCEDURE — 0DBK8ZX EXCISION OF ASCENDING COLON, VIA NATURAL OR ARTIFICIAL OPENING ENDOSCOPIC, DIAGNOSTIC: ICD-10-PCS | Performed by: INTERNAL MEDICINE

## 2024-01-12 PROCEDURE — 0DBL8ZX EXCISION OF TRANSVERSE COLON, VIA NATURAL OR ARTIFICIAL OPENING ENDOSCOPIC, DIAGNOSTIC: ICD-10-PCS | Performed by: INTERNAL MEDICINE

## 2024-01-12 PROCEDURE — 82962 GLUCOSE BLOOD TEST: CPT

## 2024-01-12 PROCEDURE — 88305 TISSUE EXAM BY PATHOLOGIST: CPT | Performed by: INTERNAL MEDICINE

## 2024-01-12 RX ORDER — NICOTINE POLACRILEX 4 MG
30 LOZENGE BUCCAL
Status: DISCONTINUED | OUTPATIENT
Start: 2024-01-12 | End: 2024-01-12

## 2024-01-12 RX ORDER — DEXTROSE MONOHYDRATE 25 G/50ML
50 INJECTION, SOLUTION INTRAVENOUS
Status: DISCONTINUED | OUTPATIENT
Start: 2024-01-12 | End: 2024-01-12

## 2024-01-12 RX ORDER — SODIUM CHLORIDE, SODIUM LACTATE, POTASSIUM CHLORIDE, CALCIUM CHLORIDE 600; 310; 30; 20 MG/100ML; MG/100ML; MG/100ML; MG/100ML
INJECTION, SOLUTION INTRAVENOUS CONTINUOUS
Status: DISCONTINUED | OUTPATIENT
Start: 2024-01-12 | End: 2024-01-12

## 2024-01-12 RX ORDER — LIDOCAINE HYDROCHLORIDE 10 MG/ML
INJECTION, SOLUTION EPIDURAL; INFILTRATION; INTRACAUDAL; PERINEURAL AS NEEDED
Status: DISCONTINUED | OUTPATIENT
Start: 2024-01-12 | End: 2024-01-12 | Stop reason: SURG

## 2024-01-12 RX ORDER — NICOTINE POLACRILEX 4 MG
15 LOZENGE BUCCAL
Status: DISCONTINUED | OUTPATIENT
Start: 2024-01-12 | End: 2024-01-12

## 2024-01-12 RX ORDER — NALOXONE HYDROCHLORIDE 0.4 MG/ML
0.08 INJECTION, SOLUTION INTRAMUSCULAR; INTRAVENOUS; SUBCUTANEOUS ONCE AS NEEDED
Status: DISCONTINUED | OUTPATIENT
Start: 2024-01-12 | End: 2024-01-12

## 2024-01-12 RX ADMIN — LIDOCAINE HYDROCHLORIDE 50 MG: 10 INJECTION, SOLUTION EPIDURAL; INFILTRATION; INTRACAUDAL; PERINEURAL at 10:10:00

## 2024-01-12 NOTE — H&P
History & Physical Examination    Patient Name: Sasha Martinez [place]  MRN: [id number]  CSN: [id number]  YOB: 1945    Diagnosis: personal history of colon polyps    Present Illness: 79 y/o F history as above presents for Colonoscopy.     Medications Prior to Admission   Medication Sig Dispense Refill Last Dose    insulin aspart 100 Units/mL Subcutaneous Solution Pen-injector Inject into the skin.   1/11/2024    lisinopril 10 MG Oral Tab Take 1 tablet (10 mg total) by mouth daily. 90 tablet 1 1/11/2024    Liraglutide (VICTOZA) 18 MG/3ML Subcutaneous Solution Pen-injector Inject 1.8 mg into the skin daily. 9 each 1 1/11/2024    Insulin Glargine, 2 Unit Dial, (TOUJEO MAX SOLOSTAR) 300 UNIT/ML Subcutaneous Solution Pen-injector Inject 60 Units into the skin daily. 18 mL 1 1/12/2024    Empagliflozin (JARDIANCE) 25 MG Oral Tab Take 1 tablet by mouth daily. 90 tablet 3 1/11/2024    metFORMIN HCl 1000 MG Oral Tab Take 1 tablet (1,000 mg total) by mouth 2 (two) times daily with meals. 180 tablet 3 1/11/2024    atorvastatin (LIPITOR) 40 MG Oral Tab Take 1 tablet (40 mg total) by mouth nightly. 90 tablet 3 1/11/2024    torsemide 5 MG Oral Tab Take 1 tablet (5 mg total) by mouth daily. 90 tablet 3 1/11/2024    PEG 3350-KCl-Na Bicarb-NaCl 420 g Oral Recon Soln Take as directed by physician 4000 mL 0 1/11/2024    Insulin Pen Needle (BD PEN NEEDLE BRYSON U/F) 32G X 4 MM Does not apply Misc Use daily with Toujeo 90 each 3 1/11/2024    OneTouch Delica Lancets 33G Does not apply Misc TEST BLOOD SUGAR DAILY Non-Insulin Dependent Diabetes Type II. E11.9. 100 each 3 1/11/2024    Blood Glucose Monitoring Suppl (ONETOUCH ULTRA 2) w/Device Does not apply Kit TEST BLOOD SUGAR ONCE DAILY Non-Insulin Dependent Diabetes Type II. E11.9. 1 kit 0 1/11/2024    Glucose Blood (ONETOUCH ULTRA) In Vitro Strip TEST BLOOD SUGAR ONCE DAILY Non-Insulin Dependent Diabetes Type II. E11.9. 100 strip 3 1/11/2024    Calcium Carbonate-Vitamin D  (CALCIUM + D OR) Take 1 tablet by mouth daily.   1/11/2024    aspirin 81 MG Oral Tab Take 1 tablet (81 mg total) by mouth daily.   1/11/2024     Current Facility-Administered Medications   Medication Dose Route Frequency    glucose (Dex4) 15 GM/59ML oral liquid 15 g  15 g Oral Q15 Min PRN    Or    glucose (Glutose) 40% oral gel 15 g  15 g Oral Q15 Min PRN    Or    glucose-vitamin C (Dex-4) chewable tab 4 tablet  4 tablet Oral Q15 Min PRN    Or    dextrose 50% injection 50 mL  50 mL Intravenous Q15 Min PRN    Or    glucose (Dex4) 15 GM/59ML oral liquid 30 g  30 g Oral Q15 Min PRN    Or    glucose (Glutose) 40% oral gel 30 g  30 g Oral Q15 Min PRN    Or    glucose-vitamin C (Dex-4) chewable tab 8 tablet  8 tablet Oral Q15 Min PRN    lactated ringers infusion   Intravenous Continuous       Allergies:   Allergies   Allergen Reactions    Pcn [Penicillins] UNKNOWN     Childhood reaction       Past Medical History:   Diagnosis Date    Arthritis     Cancer (HCC)     breast    High blood pressure     High cholesterol     WIL (obstructive sleep apnea) 2/6/17-Split    AHI 92 RDI 95 REM AHI 0 Supine  non-supine AHI 70 Sao2 Seven 79%/CPAP-10cpw w/ 5L O2    Type II or unspecified type diabetes mellitus without mention of complication, not stated as uncontrolled     Unspecified essential hypertension     Visual impairment      Past Surgical History:   Procedure Laterality Date    COLONOSCOPY N/A 2/14/2019    SSA, adenomas- repeat 3 yrs    COLONOSCOPY & POLYPECTOMY  8/15    polyps; tics; repeat 3 yrs (adenoma)    COLONOSCOPY,BIOPSY  8/19/2015    Procedure: ;  Surgeon: Ismael Vazquez MD;  Location: [facility]    COLONOSCOPY,REMV LESN,SNARE N/A 8/19/2015    Procedure: COLONOSCOPY, POSSIBLE BIOPSY, POSSIBLE POLYPECTOMY 33624;  Surgeon: Ismael Vazquez MD;  Location: [facility]    PARATHYROIDECTOMY      TOTAL KNEE REPLACEMENT Left 11/22/16    Dr Higgins    TOTAL KNEE REPLACEMENT Right 2/28/17     Dr Higgins     Family History   Problem Relation Age of Onset    Arthritis Mother     Diabetes Mother      Social History     Tobacco Use    Smoking status: Never    Smokeless tobacco: Never   Substance Use Topics    Alcohol use: Yes     Alcohol/week: 0.0 standard drinks of alcohol     Comment: once or twice a year       SYSTEM Check if Review is Normal Check if Physical Exam is Normal If not normal, please explain:   HEENT [ x] [x ]    NECK & BACK [ x] [ x]    HEART [ x] [x ]    LUNGS [ x] [ x]    ABDOMEN [ x] [x ]    UROGENITAL [ n/a] [ n/a]    EXTREMITIES [ x] [x ]    OTHER        [ x ] I have discussed the risks and benefits and alternatives with the patient/family.  They understand and agree to proceed with plan of care.  [ x ] I have reviewed the History and Physical done within the last 30 days.  Any changes noted above.    Yahir Cast DO  1/12/2024  9:59 AM

## 2024-01-12 NOTE — DISCHARGE INSTRUCTIONS
Home Care Instructions for Colonoscopy with Sedation    Diet:  - Resume your regular diet as tolerated unless otherwise instructed.  - Start with light meals to minimize bloating.  - Do not drink alcohol today.    Medication:  - If you have questions about resuming your normal medications, please contact your Primary Care Physician.    Activities:  - Take it easy today. Do not return to work today.  - Do not drive today.  - Do not operate any machinery today (including kitchen equipment).    Colonoscopy:  - You may notice some rectal \"spotting\" (a little blood on the toilet tissue) for a day or two after the exam. This is normal.  - If you experience any rectal bleeding (not spotting), persistent tenderness or sharp severe abdominal pains, oral temperature over 100 degrees Fahrenheit, light-headedness or dizziness, or any other problems, contact your doctor.    **If unable to reach your doctor, please go to the [facility] Emergency Room**    - Your referring physician will receive a full report of your examination.  - If you do not hear from your doctor's office within two weeks of your biopsy, please call them for your results.    You may be able to see your laboratory results in [employer] between 4 and 7 business days.  In some cases, your physician may not have viewed the results before they are released to [employer].  If you have questions regarding your results contact the physician who ordered the test/exam by phone or via [employer] by choosing \"Ask a Medical Question.\"

## 2024-01-12 NOTE — OPERATIVE REPORT
Sasha Martinez Valdivia Patient Status:  Hospital Outpatient Surgery    1945 MRN [id number]   [facility] ENDOSCOPY [facility]IN [facility] Attending Yahir Cast,    Hosp Day # 0 PCP Hans Albarran MD       PREOPERATIVE DIAGNOSIS/INDICATION: Personal History of Colon Polyps  POSTOPERTATIVE DIAGNOSIS: See Impression  PROCEDURE PERFORMED: COLONOSCOPY with SNARE  DATE: 24  SEDATION: MAC sedation provided by General Anesthesia    TIME OUT WAS PERFORMED    INFORMED CONSENT: I have discussed the risks, benefits, and alternatives to colonoscopy with the patient including but not limited to the risks of bleeding, infection, pain, as well as the risks of anesthesia and perforation all possibly leading to prolonged hospitalization, surgical intervention. I explained that 5-10 % of polyps may be missed even in the best of all circumstances. All questions were answered to the patient’s satisfaction. The patient elected to proceed with colonoscopy with intervention as indicated.  PROCEDURE DESCRIPTION: After careful digital rectal examination a  colonoscope was introduced into the patients rectum, advanced beyond the recto sigmoid junction, into the descending colon, splenic flexure, transverse colon, hepatic flexure, ascending colon, cecum and the last 5-10 cm of the terminal ileum, confirmed by landmarks, including the appendiceal orifice and ileocecal valve. Careful examination of the above described areas was performed on withdrawal of the endoscope. Retroflexion was performed in the rectum. The patient tolerated the procedure well.  There were no immediate complications immediately following the procedure.  The patient was transferred to recovery in stable condition.  QUALITY OF PREPARATION: [place] Bowel Preparation Scale:    Right colon 3, Transverse colon 3, Left colon 3   FINDINGS:  Terminal Ileum: Normal mucosa  Cecum: The mucosa and vascular pattern were normal.   Ascending colon: Two polyps  ranging 2-3 mm s/p removal using cold biopsy forceps and cold snare polypectomy.   Transverse colon: Two 4-5 mm sessile polyps s/p cold snare polypectomy.   Descending colon: The mucosa and vascular pattern were normal.  Sigmoid colon: The mucosa and vascular pattern were normal.  Rectum: The mucosa and vascular pattern were normal. Retroflexion revealed internal hemorrhoids.     IMPRESSION:   1. Colon Polyps.    2. Internal Hemorrhoids.     RECOMMENDATIONS:   1. Await pathology results.    2. Repeat colonoscopy in 3 years.       [facility]  Gastroenterology/Advanced Endoscopy  [facility]

## 2024-01-12 NOTE — ANESTHESIA POSTPROCEDURE EVALUATION
[facility]    Sasha Martinez [place] Patient Status:  Hospital Outpatient Surgery   Age/Gender 78 year old female MRN [id number]   Location [facility] ENDOSCOPY PAIN CENTER Attending Yahir Cast DO   Hosp Day # 0 PCP Hans Albarran MD       Anesthesia Post-op Note    COLONOSCOPY with forcep and cold snare polypectomy    Procedure Summary       Date: 01/12/24 Room / Location:  ENDOSCOPY 04 /  ENDOSCOPY    Anesthesia Start: 1005 Anesthesia Stop:     Procedure: COLONOSCOPY with forcep and cold snare polypectomy Diagnosis:       Personal history of colonic polyps      (colon polyps)    Surgeons: Yahir Cast DO Anesthesiologist: Krish Chi MD    Anesthesia Type: MAC ASA Status: 3            Anesthesia Type: MAC    Vitals Value Taken Time   /51 01/12/24 1036   Temp na 01/12/24 1036   Pulse 75 01/12/24 1036   Resp 16 01/12/24 1036   SpO2 95 % 01/12/24 1036   Vitals shown include unfiled device data.    Patient Location: Endoscopy    Anesthesia Type: MAC    Airway Patency: patent    Postop Pain Control: adequate    Mental Status: mildly sedated but able to meaningfully participate in the post-anesthesia evaluation    Nausea/Vomiting: none    Cardiopulmonary/Hydration status: stable euvolemic    Complications: no apparent anesthesia related complications    Postop vital signs: stable    Dental Exam: Unchanged from Preop    Patient to be discharged from PACU when criteria met.

## 2024-01-12 NOTE — ANESTHESIA PREPROCEDURE EVALUATION
PRE-OP EVALUATION    Patient Name: Sasha Martinez Valdivia    Admit Diagnosis: Personal history of colonic polyps [Z86.010]    Pre-op Diagnosis: Personal history of colonic polyps [Z86.010]    COLONOSCOPY    Anesthesia Procedure: COLONOSCOPY    Surgeon(s) and Role:     * Yahir Cast,  - [place]    Pre-op vitals reviewed.        Body mass index is 43.14 kg/m².    Current medications reviewed.  Hospital Medications:  • glucose (Dex4) 15 GM/59ML oral liquid 15 g  15 g Oral Q15 Min PRN    Or   • glucose (Glutose) 40% oral gel 15 g  15 g Oral Q15 Min PRN    Or   • glucose-vitamin C (Dex-4) chewable tab 4 tablet  4 tablet Oral Q15 Min PRN    Or   • dextrose 50% injection 50 mL  50 mL Intravenous Q15 Min PRN    Or   • glucose (Dex4) 15 GM/59ML oral liquid 30 g  30 g Oral Q15 Min PRN    Or   • glucose (Glutose) 40% oral gel 30 g  30 g Oral Q15 Min PRN    Or   • glucose-vitamin C (Dex-4) chewable tab 8 tablet  8 tablet Oral Q15 Min PRN   • lactated ringers infusion   Intravenous Continuous       Outpatient Medications:     Medications Prior to Admission   Medication Sig Dispense Refill Last Dose   • insulin aspart 100 Units/mL Subcutaneous Solution Pen-injector Inject into the skin.   1/11/2024   • lisinopril 10 MG Oral Tab Take 1 tablet (10 mg total) by mouth daily. 90 tablet 1 1/11/2024   • Liraglutide (VICTOZA) 18 MG/3ML Subcutaneous Solution Pen-injector Inject 1.8 mg into the skin daily. 9 each 1 1/11/2024   • Insulin Glargine, 2 Unit Dial, (TOUJEO MAX SOLOSTAR) 300 UNIT/ML Subcutaneous Solution Pen-injector Inject 60 Units into the skin daily. 18 mL 1 1/11/2024   • Empagliflozin (JARDIANCE) 25 MG Oral Tab Take 1 tablet by mouth daily. 90 tablet 3 1/11/2024   • metFORMIN HCl 1000 MG Oral Tab Take 1 tablet (1,000 mg total) by mouth 2 (two) times daily with meals. 180 tablet 3 1/11/2024   • atorvastatin (LIPITOR) 40 MG Oral Tab Take 1 tablet (40 mg total) by mouth nightly. 90 tablet 3 1/11/2024   • torsemide 5 MG  Oral Tab Take 1 tablet (5 mg total) by mouth daily. 90 tablet 3 1/11/2024   • PEG 3350-KCl-Na Bicarb-NaCl 420 g Oral Recon Soln Take as directed by physician 4000 mL 0 1/11/2024   • Insulin Pen Needle (BD PEN NEEDLE BRYSON U/F) 32G X 4 MM Does not apply Misc Use daily with Toujeo 90 each 3 1/11/2024   • OneTouch Delica Lancets 33G Does not apply Misc TEST BLOOD SUGAR DAILY Non-Insulin Dependent Diabetes Type II. E11.9. 100 each 3 1/11/2024   • Blood Glucose Monitoring Suppl (ONETOUCH ULTRA 2) w/Device Does not apply Kit TEST BLOOD SUGAR ONCE DAILY Non-Insulin Dependent Diabetes Type II. E11.9. 1 kit 0 1/11/2024   • Glucose Blood (ONETOUCH ULTRA) In Vitro Strip TEST BLOOD SUGAR ONCE DAILY Non-Insulin Dependent Diabetes Type II. E11.9. 100 strip 3 1/11/2024   • Calcium Carbonate-Vitamin D (CALCIUM + D OR) Take 1 tablet by mouth daily.   1/11/2024   • aspirin 81 MG Oral Tab Take 1 tablet (81 mg total) by mouth daily.   1/11/2024       Allergies: Pcn [penicillins]      Anesthesia Evaluation    Patient summary reviewed.    Anesthetic Complications  (-) history of anesthetic complications         GI/Hepatic/Renal             (+) chronic renal disease                    Cardiovascular      ECG reviewed.  Exercise tolerance: good     MET: >4      (+) hypertension   (+) hyperlipidemia                  (+) CHF                Endo/Other      (+) diabetes                            Pulmonary  Comment: PHTN                  (+) sleep apnea       Neuro/Psych                                  Past Surgical History:   Procedure Laterality Date   • COLONOSCOPY N/A 2/14/2019    SSA, adenomas- repeat 3 yrs   • COLONOSCOPY & POLYPECTOMY  8/15    polyps; tics; repeat 3 yrs (adenoma)   • COLONOSCOPY,BIOPSY  8/19/2015    Procedure: ;  Surgeon: Ismael Vazquez MD;  Location: [facility]   • COLONOSCOPY,REMV LESN,SNARE N/A 8/19/2015    Procedure: COLONOSCOPY, POSSIBLE BIOPSY, POSSIBLE POLYPECTOMY 60746;  Surgeon: George  Ismael AGUILAR MD;  Location: [facility] SURGICAL [facility]   • PARATHYROIDECTOMY     • TOTAL KNEE REPLACEMENT Left 11/22/16    Dr Higgins   • TOTAL KNEE REPLACEMENT Right 2/28/17    Dr Higgins     Social History     Socioeconomic History   • Marital status:    Tobacco Use   • Smoking status: Never   • Smokeless tobacco: Never   Vaping Use   • Vaping Use: Never used   Substance and Sexual Activity   • Alcohol use: Yes     Alcohol/week: 0.0 standard drinks of alcohol     Comment: once or twice a year   • Drug use: No     History   Drug Use No     Available pre-op labs reviewed.     Lab Results   Component Value Date     01/09/2024    K 4.0 01/09/2024     01/09/2024    CO2 25.0 01/09/2024    BUN 23 01/09/2024    CREATSERUM 1.05 (H) 01/09/2024     (H) 01/09/2024    CA 9.7 01/09/2024            Airway      Mallampati: II  Mouth opening: >3 FB  TM distance: > 6 cm  Neck ROM: full Cardiovascular    Cardiovascular exam normal.  Rhythm: regular  Rate: normal     Dental             Pulmonary    Pulmonary exam normal.  Breath sounds clear to auscultation bilaterally.               Other findings        ASA: 3   Plan: MAC  NPO status verified and patient meets guidelines.    Post-procedure pain management plan discussed with surgeon and patient.      Plan/risks discussed with: patient            Present on Admission:  **None**

## 2024-01-17 ENCOUNTER — TELEPHONE (OUTPATIENT)
Dept: INTERNAL MEDICINE CLINIC | Facility: CLINIC | Age: 79
End: 2024-01-17

## 2024-01-17 NOTE — TELEPHONE ENCOUNTER
Outpatient Medication Detail     Disp Refills Start End    Liraglutide (VICTOZA) 18 MG/3ML Subcutaneous Solution Pen-injector 9 each 1 1/9/2024 4/8/2024    Sig - Route: Inject 1.8 mg into the skin daily. - Subcutaneous    Sent to pharmacy as: Victoza 18 MG/3ML Subcutaneous Solution Pen-injector (Liraglutide)    Notes to Pharmacy: ATTN: The order is for generic Victoza    E-Prescribing Status: Receipt confirmed by pharmacy (1/9/2024 10:06 AM CST)    Prior authorization: Payer Waiting for Response        PA initiated

## 2024-01-17 NOTE — TELEPHONE ENCOUNTER
Questions answered.     Outpatient Medication Detail     Disp Refills Start End    Liraglutide (VICTOZA) 18 MG/3ML Subcutaneous Solution Pen-injector 9 each 1 1/9/2024 4/8/2024    Sig - Route: Inject 1.8 mg into the skin daily. - Subcutaneous    Sent to pharmacy as: Victoza 18 MG/3ML Subcutaneous Solution Pen-injector (Liraglutide)    Notes to Pharmacy: ATTN: The order is for generic Victoza    E-Prescribing Status: Receipt confirmed by pharmacy (1/9/2024 10:06 AM CST)    Prior authorization: Waiting for Payer Response

## 2024-01-18 NOTE — TELEPHONE ENCOUNTER
Outpatient Medication Detail     Disp Refills Start End    Liraglutide (VICTOZA) 18 MG/3ML Subcutaneous Solution Pen-injector 9 each 1 1/9/2024 4/8/2024    Sig - Route: Inject 1.8 mg into the skin daily. - Subcutaneous    Sent to pharmacy as: Victoza 18 MG/3ML Subcutaneous Solution Pen-injector (Liraglutide)    Notes to Pharmacy: ATTN: The order is for generic Victoza    E-Prescribing Status: Receipt confirmed by pharmacy (1/9/2024 10:06 AM CST)    Prior authorization: Denied        PA denied.     MK - Do you want to order alternative?

## 2024-01-19 ENCOUNTER — TELEPHONE (OUTPATIENT)
Dept: INTERNAL MEDICINE CLINIC | Facility: CLINIC | Age: 79
End: 2024-01-19

## 2024-01-19 NOTE — TELEPHONE ENCOUNTER
Liraglutide (VICTOZA) 18 MG/3ML Subcutaneous Solution Pen-injector PA completed and denied.    No alternatives listed    CaseId:[id number];Status:Denied.    MK- did you want to send alternative?

## 2024-02-22 ENCOUNTER — OFFICE VISIT (OUTPATIENT)
Dept: INTERNAL MEDICINE CLINIC | Facility: CLINIC | Age: 79
End: 2024-02-22
Payer: COMMERCIAL

## 2024-02-22 VITALS
OXYGEN SATURATION: 92 % | BODY MASS INDEX: 43.9 KG/M2 | TEMPERATURE: 98 F | HEART RATE: 100 BPM | HEIGHT: 68.5 IN | WEIGHT: 293 LBS | SYSTOLIC BLOOD PRESSURE: 126 MMHG | DIASTOLIC BLOOD PRESSURE: 82 MMHG | RESPIRATION RATE: 16 BRPM

## 2024-02-22 DIAGNOSIS — J35.1 ENLARGED TONSILS: Primary | ICD-10-CM

## 2024-02-22 PROBLEM — I50.32 CHRONIC DIASTOLIC CHF (CONGESTIVE HEART FAILURE) (HCC): Status: RESOLVED | Noted: 2019-09-30 | Resolved: 2024-02-22

## 2024-02-22 PROCEDURE — 3079F DIAST BP 80-89 MM HG: CPT | Performed by: FAMILY MEDICINE

## 2024-02-22 PROCEDURE — 1170F FXNL STATUS ASSESSED: CPT | Performed by: FAMILY MEDICINE

## 2024-02-22 PROCEDURE — 3074F SYST BP LT 130 MM HG: CPT | Performed by: FAMILY MEDICINE

## 2024-02-22 PROCEDURE — 1159F MED LIST DOCD IN RCRD: CPT | Performed by: FAMILY MEDICINE

## 2024-02-22 PROCEDURE — 3008F BODY MASS INDEX DOCD: CPT | Performed by: FAMILY MEDICINE

## 2024-02-22 PROCEDURE — 99213 OFFICE O/P EST LOW 20 MIN: CPT | Performed by: FAMILY MEDICINE

## 2024-02-22 PROCEDURE — 1160F RVW MEDS BY RX/DR IN RCRD: CPT | Performed by: FAMILY MEDICINE

## 2024-02-22 NOTE — PROGRESS NOTES
Sasha Valdivia  2/4/1945    Chief Complaint   Patient presents with    Follow - Up     Rm 8        HPI:   Sasha Valdivia is a 79 year old female who presents for evaluation of large tonsils. She has had long history of large tonsils feels that her left tonsil has a growth. No sig pain, dysphagia, or respiratory symptoms.     Current Outpatient Medications   Medication Sig Dispense Refill    semaglutide 4 MG/3ML Subcutaneous Solution Pen-injector Inject 1 mg into the skin once a week. 9 mL 0    lisinopril 10 MG Oral Tab Take 1 tablet (10 mg total) by mouth daily. 90 tablet 1    Empagliflozin (JARDIANCE) 25 MG Oral Tab Take 1 tablet by mouth daily. 90 tablet 3    metFORMIN HCl 1000 MG Oral Tab Take 1 tablet (1,000 mg total) by mouth 2 (two) times daily with meals. 180 tablet 3    atorvastatin (LIPITOR) 40 MG Oral Tab Take 1 tablet (40 mg total) by mouth nightly. 90 tablet 3    torsemide 5 MG Oral Tab Take 1 tablet (5 mg total) by mouth daily. 90 tablet 3    Insulin Pen Needle (BD PEN NEEDLE BRYSON U/F) 32G X 4 MM Does not apply Misc Use daily with Toujeo 90 each 3    OneTouch Delica Lancets 33G Does not apply Misc TEST BLOOD SUGAR DAILY Non-Insulin Dependent Diabetes Type II. E11.9. 100 each 3    Blood Glucose Monitoring Suppl (ONETOUCH ULTRA 2) w/Device Does not apply Kit TEST BLOOD SUGAR ONCE DAILY Non-Insulin Dependent Diabetes Type II. E11.9. 1 kit 0    Glucose Blood (ONETOUCH ULTRA) In Vitro Strip TEST BLOOD SUGAR ONCE DAILY Non-Insulin Dependent Diabetes Type II. E11.9. 100 strip 3    Calcium Carbonate-Vitamin D (CALCIUM + D OR) Take 1 tablet by mouth daily.      aspirin 81 MG Oral Tab Take 1 tablet (81 mg total) by mouth daily.      insulin aspart 100 Units/mL Subcutaneous Solution Pen-injector Inject into the skin.      PEG 3350-KCl-Na Bicarb-NaCl 420 g Oral Recon Soln Take as directed by physician 4000 mL 0      Allergies   Allergen Reactions    Pcn [Penicillins] UNKNOWN     Childhood  reaction      Past Medical History:   Diagnosis Date    Arthritis     Cancer (HCC)     breast    High blood pressure     High cholesterol     WIL (obstructive sleep apnea) 2/6/17-Split    AHI 92 RDI 95 REM AHI 0 Supine  non-supine AHI 70 Sao2 Seven 79%/CPAP-10cpw w/ 5L O2    Type II or unspecified type diabetes mellitus without mention of complication, not stated as uncontrolled     Unspecified essential hypertension     Visual impairment       Patient Active Problem List   Diagnosis    Multiple thyroid nodules    Vitamin D deficiency    Primary osteoarthritis of both knees    Pes planus of both feet    Primary osteoarthritis of both feet    Chronic gout without tophus, unspecified cause, unspecified site    S/P subtotal parathyroidectomy    Hyperlipidemia, unspecified hyperlipidemia type    History of adenomatous polyp of colon    Essential hypertension with goal blood pressure less than 140/90    Morbid obesity, unspecified obesity type (HCC)    Allergic rhinitis, unspecified seasonality, unspecified trigger    Malignant neoplasm of left female breast, unspecified estrogen receptor status, unspecified site of breast (HCC)    Osteopenia, unspecified location    WIL (obstructive sleep apnea)    Status post bilateral knee replacements    Stage 3b chronic kidney disease (HCC)    Type 2 diabetes mellitus with stage 3b chronic kidney disease, without long-term current use of insulin (HCC)    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 and stage 3b chronic kidney disease (HCC)    Tortuous aorta (HCC)    Moderate pulmonary hypertension (HCC)      Past Surgical History:   Procedure Laterality Date    COLONOSCOPY N/A 2/14/2019    SSA, adenomas- repeat 3 yrs    COLONOSCOPY N/A 1/12/2024    Procedure: COLONOSCOPY with forcep and cold snare polypectomy;  Surgeon: Yahir Cast DO;  Location:  ENDOSCOPY    COLONOSCOPY & POLYPECTOMY  8/15    polyps; tics; repeat 3 yrs  (adenoma)    COLONOSCOPY,BIOPSY  8/19/2015    Procedure: ;  Surgeon: Ismael Vazquez MD;  Location: [facility], [facility]    COLONOSCOPY,REMV LESN,SNARE N/A 8/19/2015    Procedure: COLONOSCOPY, POSSIBLE BIOPSY, POSSIBLE POLYPECTOMY 63188;  Surgeon: Ismael Vazquez MD;  Location: [facility], [facility]    PARATHYROIDECTOMY      TOTAL KNEE REPLACEMENT Left 11/22/16    Dr Higgins    TOTAL KNEE REPLACEMENT Right 2/28/17    Dr Higgins      Family History   Problem Relation Age of Onset    Arthritis Mother     Diabetes Mother       Social History     Socioeconomic History    Marital status:    Tobacco Use    Smoking status: Never    Smokeless tobacco: Never   Vaping Use    Vaping Use: Never used   Substance and Sexual Activity    Alcohol use: Yes     Alcohol/week: 0.0 standard drinks of alcohol     Comment: once or twice a year    Drug use: No         REVIEW OF SYSTEMS:   GENERAL: feels well otherwise    EXAM:   /82 (BP Location: Left arm, Patient Position: Sitting, Cuff Size: adult)   Pulse 100   Temp 97.6 °F (36.4 °C) (Skin)   Resp 16   Ht 5' 8.5\" (1.74 m)   Wt 298 lb 12.8 oz (135.5 kg)   SpO2 92%   BMI 44.77 kg/m²   GENERAL: Well developed, well nourished,in no apparent distress  HEENT: mild enlargement of bilateral tonsils with asymmetry of left tonsil without obvious mass.     ASSESSMENT AND PLAN:   Sasha Valdivia is a 79 year old female who presents for evaluation    Enlarged tonsils  Mild to moderate enlargement with possible nodular asymmetry on the left.  Recommend ENT eval.   - ENT - INTERNAL    All questions were answered and the patient agrees with the plan.     Thank you,  Hans Albarran MD

## 2024-03-22 ENCOUNTER — OFFICE VISIT (OUTPATIENT)
Facility: LOCATION | Age: 79
End: 2024-03-22
Payer: COMMERCIAL

## 2024-03-22 DIAGNOSIS — J35.1 HYPERTROPHY TONSILS: Primary | ICD-10-CM

## 2024-03-22 PROCEDURE — 99203 OFFICE O/P NEW LOW 30 MIN: CPT | Performed by: OTOLARYNGOLOGY

## 2024-03-22 PROCEDURE — 1159F MED LIST DOCD IN RCRD: CPT | Performed by: OTOLARYNGOLOGY

## 2024-03-22 PROCEDURE — 1160F RVW MEDS BY RX/DR IN RCRD: CPT | Performed by: OTOLARYNGOLOGY

## 2024-03-22 NOTE — PROGRESS NOTES
Sasha Valdivia is a 79 year old female.   Chief Complaint   Patient presents with    Tonsil Problem     HPI:   For the last year her dentist has noticed her left tonsil is been enlarged.  He is concerned about some lumps.  There is no sore throat no bleeding.  She is a non-smoker.  She has had 2 other cancers.  Current Outpatient Medications   Medication Sig Dispense Refill    semaglutide 4 MG/3ML Subcutaneous Solution Pen-injector Inject 1 mg into the skin once a week. 9 mL 0    insulin aspart 100 Units/mL Subcutaneous Solution Pen-injector Inject into the skin.      lisinopril 10 MG Oral Tab Take 1 tablet (10 mg total) by mouth daily. 90 tablet 1    Empagliflozin (JARDIANCE) 25 MG Oral Tab Take 1 tablet by mouth daily. 90 tablet 3    metFORMIN HCl 1000 MG Oral Tab Take 1 tablet (1,000 mg total) by mouth 2 (two) times daily with meals. 180 tablet 3    atorvastatin (LIPITOR) 40 MG Oral Tab Take 1 tablet (40 mg total) by mouth nightly. 90 tablet 3    torsemide 5 MG Oral Tab Take 1 tablet (5 mg total) by mouth daily. 90 tablet 3    PEG 3350-KCl-Na Bicarb-NaCl 420 g Oral Recon Soln Take as directed by physician 4000 mL 0    Insulin Pen Needle (BD PEN NEEDLE BRYSON U/F) 32G X 4 MM Does not apply Misc Use daily with Toujeo 90 each 3    OneTouch Delica Lancets 33G Does not apply Misc TEST BLOOD SUGAR DAILY Non-Insulin Dependent Diabetes Type II. E11.9. 100 each 3    Blood Glucose Monitoring Suppl (ONETOUCH ULTRA 2) w/Device Does not apply Kit TEST BLOOD SUGAR ONCE DAILY Non-Insulin Dependent Diabetes Type II. E11.9. 1 kit 0    Glucose Blood (ONETOUCH ULTRA) In Vitro Strip TEST BLOOD SUGAR ONCE DAILY Non-Insulin Dependent Diabetes Type II. E11.9. 100 strip 3    Calcium Carbonate-Vitamin D (CALCIUM + D OR) Take 1 tablet by mouth daily.      aspirin 81 MG Oral Tab Take 1 tablet (81 mg total) by mouth daily.        Past Medical History:   Diagnosis Date    Arthritis     Cancer (HCC)     breast    High blood pressure      High cholesterol     WIL (obstructive sleep apnea) 2/6/17-Split    AHI 92 RDI 95 REM AHI 0 Supine  non-supine AHI 70 Sao2 Seven 79%/CPAP-10cpw w/ 5L O2    Type II or unspecified type diabetes mellitus without mention of complication, not stated as uncontrolled     Unspecified essential hypertension     Visual impairment       Social History:  Social History     Socioeconomic History    Marital status:    Tobacco Use    Smoking status: Never    Smokeless tobacco: Never   Vaping Use    Vaping Use: Never used   Substance and Sexual Activity    Alcohol use: Yes     Alcohol/week: 0.0 standard drinks of alcohol     Comment: once or twice a year    Drug use: No      Past Surgical History:   Procedure Laterality Date    COLONOSCOPY N/A 2/14/2019    SSA, adenomas- repeat 3 yrs    COLONOSCOPY N/A 1/12/2024    Procedure: COLONOSCOPY with forcep and cold snare polypectomy;  Surgeon: Yahir Cast DO;  Location:  ENDOSCOPY    COLONOSCOPY & POLYPECTOMY  8/15    polyps; tics; repeat 3 yrs (adenoma)    COLONOSCOPY,BIOPSY  8/19/2015    Procedure: ;  Surgeon: Ismael Vazquez MD;  Location: [facility]    COLONOSCOPY,REMV LESN,SNARE N/A 8/19/2015    Procedure: COLONOSCOPY, POSSIBLE BIOPSY, POSSIBLE POLYPECTOMY 72212;  Surgeon: Ismael Vazquez MD;  Location: [facility]    PARATHYROIDECTOMY      TOTAL KNEE REPLACEMENT Left 11/22/16    Dr Higgins    TOTAL KNEE REPLACEMENT Right 2/28/17    Dr Higgins         REVIEW OF SYSTEMS:   GENERAL HEALTH: feels well otherwise  GENERAL : denies fever, chills, sweats, weight loss, weight gain  SKIN: denies any unusual skin lesions or rashes  RESPIRATORY: denies shortness of breath with exertion  NEURO: denies headaches    EXAM:   There were no vitals taken for this visit.    System Findings Details   Constitutional  Overall appearance - Normal.   Psychiatric  Orientation - Oriented to time, place, person & situation. Appropriate mood and affect.    Head/Face  Facial features -- Normal. Skull - Normal.   Eyes  Pupils equal ,round ,react to light and accomidate   Ears, Nose, Throat, Neck  Ears clear nose clear oral cavity clear oropharynx is +2/4 tonsils bilaterally with tonsil lift formation in the tonsils left greater than right neck supple without masses   Neurological  Memory - Normal. Cranial nerves - Cranial nerves II through XII grossly intact.   Lymph Detail  Submental. Submandibular. Anterior cervical. Posterior cervical. Supraclavicular.       ASSESSMENT AND PLAN:   1. Hypertrophy tonsils  She does have enlarged tonsils and she has some tonsil lifts on the left tonsil but there is no true mass or ulceration seen.  If she should have pain or bleeding she will see me back.      The patient indicates understanding of these issues and agrees to the plan.    No follow-ups on file.    Joao Gross MD  3/22/2024  11:07 AM

## 2024-03-25 DIAGNOSIS — Z17.0 MALIGNANT NEOPLASM OF LOWER-INNER QUADRANT OF LEFT BREAST IN FEMALE, ESTROGEN RECEPTOR POSITIVE (CMD): Primary | ICD-10-CM

## 2024-03-25 DIAGNOSIS — C50.312 MALIGNANT NEOPLASM OF LOWER-INNER QUADRANT OF LEFT BREAST IN FEMALE, ESTROGEN RECEPTOR POSITIVE (CMD): Primary | ICD-10-CM

## 2024-03-28 ENCOUNTER — OFFICE VISIT (OUTPATIENT)
Dept: INTERNAL MEDICINE CLINIC | Facility: CLINIC | Age: 79
End: 2024-03-28
Payer: COMMERCIAL

## 2024-03-28 VITALS
BODY MASS INDEX: 43.9 KG/M2 | HEIGHT: 68.5 IN | WEIGHT: 293 LBS | HEART RATE: 89 BPM | OXYGEN SATURATION: 98 % | DIASTOLIC BLOOD PRESSURE: 82 MMHG | SYSTOLIC BLOOD PRESSURE: 118 MMHG | RESPIRATION RATE: 18 BRPM

## 2024-03-28 DIAGNOSIS — Z00.00 ENCOUNTER FOR ANNUAL HEALTH EXAMINATION: Primary | ICD-10-CM

## 2024-03-28 DIAGNOSIS — E55.9 VITAMIN D DEFICIENCY: ICD-10-CM

## 2024-03-28 DIAGNOSIS — M21.41 PES PLANUS OF BOTH FEET: ICD-10-CM

## 2024-03-28 DIAGNOSIS — J30.9 ALLERGIC RHINITIS, UNSPECIFIED SEASONALITY, UNSPECIFIED TRIGGER: ICD-10-CM

## 2024-03-28 DIAGNOSIS — N18.32 TYPE 2 DIABETES MELLITUS WITH STAGE 3B CHRONIC KIDNEY DISEASE, WITHOUT LONG-TERM CURRENT USE OF INSULIN (HCC): ICD-10-CM

## 2024-03-28 DIAGNOSIS — Z98.890 S/P SUBTOTAL PARATHYROIDECTOMY: ICD-10-CM

## 2024-03-28 DIAGNOSIS — E04.2 MULTIPLE THYROID NODULES: ICD-10-CM

## 2024-03-28 DIAGNOSIS — Z90.89 S/P SUBTOTAL PARATHYROIDECTOMY: ICD-10-CM

## 2024-03-28 DIAGNOSIS — M1A.9XX0 CHRONIC GOUT WITHOUT TOPHUS, UNSPECIFIED CAUSE, UNSPECIFIED SITE: ICD-10-CM

## 2024-03-28 DIAGNOSIS — M19.072 PRIMARY OSTEOARTHRITIS OF BOTH FEET: ICD-10-CM

## 2024-03-28 DIAGNOSIS — I50.32 HYPERTENSIVE HEART AND KIDNEY DISEASE WITH CHRONIC DIASTOLIC CONGESTIVE HEART FAILURE AND STAGE 3B CHRONIC KIDNEY DISEASE (HCC): ICD-10-CM

## 2024-03-28 DIAGNOSIS — C50.912 MALIGNANT NEOPLASM OF LEFT FEMALE BREAST, UNSPECIFIED ESTROGEN RECEPTOR STATUS, UNSPECIFIED SITE OF BREAST (HCC): ICD-10-CM

## 2024-03-28 DIAGNOSIS — I13.0 HYPERTENSIVE HEART AND KIDNEY DISEASE WITH CHRONIC DIASTOLIC CONGESTIVE HEART FAILURE AND STAGE 3B CHRONIC KIDNEY DISEASE (HCC): ICD-10-CM

## 2024-03-28 DIAGNOSIS — Z96.653 STATUS POST BILATERAL KNEE REPLACEMENTS: ICD-10-CM

## 2024-03-28 DIAGNOSIS — M85.80 OSTEOPENIA, UNSPECIFIED LOCATION: ICD-10-CM

## 2024-03-28 DIAGNOSIS — I27.20 MODERATE PULMONARY HYPERTENSION (HCC): ICD-10-CM

## 2024-03-28 DIAGNOSIS — N18.32 STAGE 3B CHRONIC KIDNEY DISEASE (HCC): ICD-10-CM

## 2024-03-28 DIAGNOSIS — N18.32 HYPERTENSIVE HEART AND KIDNEY DISEASE WITH CHRONIC DIASTOLIC CONGESTIVE HEART FAILURE AND STAGE 3B CHRONIC KIDNEY DISEASE (HCC): ICD-10-CM

## 2024-03-28 DIAGNOSIS — M19.071 PRIMARY OSTEOARTHRITIS OF BOTH FEET: ICD-10-CM

## 2024-03-28 DIAGNOSIS — E78.5 HYPERLIPIDEMIA, UNSPECIFIED HYPERLIPIDEMIA TYPE: ICD-10-CM

## 2024-03-28 DIAGNOSIS — E11.22 TYPE 2 DIABETES MELLITUS WITH STAGE 3B CHRONIC KIDNEY DISEASE, WITHOUT LONG-TERM CURRENT USE OF INSULIN (HCC): ICD-10-CM

## 2024-03-28 DIAGNOSIS — E66.01 MORBID OBESITY, UNSPECIFIED OBESITY TYPE (HCC): ICD-10-CM

## 2024-03-28 DIAGNOSIS — G47.33 OSA (OBSTRUCTIVE SLEEP APNEA): ICD-10-CM

## 2024-03-28 DIAGNOSIS — I10 ESSENTIAL HYPERTENSION WITH GOAL BLOOD PRESSURE LESS THAN 140/90: ICD-10-CM

## 2024-03-28 DIAGNOSIS — M21.42 PES PLANUS OF BOTH FEET: ICD-10-CM

## 2024-03-28 DIAGNOSIS — Z86.010 HISTORY OF ADENOMATOUS POLYP OF COLON: ICD-10-CM

## 2024-03-28 DIAGNOSIS — I77.1 TORTUOUS AORTA (HCC): ICD-10-CM

## 2024-03-28 DIAGNOSIS — M17.0 PRIMARY OSTEOARTHRITIS OF BOTH KNEES: ICD-10-CM

## 2024-03-28 LAB — HEMOGLOBIN A1C: 6.9 % (ref 4.3–5.6)

## 2024-03-28 NOTE — PROGRESS NOTES
Subjective:   Sasha Valdivia is a 79 year old female who presents for a MA (Medicare Advantage) Supervisit (Once per calendar year) and scheduled follow up of multiple significant but stable problems. Overall she is doing well. Her glycemic control has greatly improved. She is struggling to find an exercise routine that works for and has a goal to be more active.     History/Other:   Fall Risk Assessment:   She has been screened for Falls and is High Risk. Fall Prevention information provided to patient in After Visit Summary.    Do you feel unsteady when standing or walking?: No  Do you worry about falling?: Yes  Have you fallen in the past year?: No     Cognitive Assessment:   She had a completely normal cognitive assessment - see flowsheet entries     Functional Ability/Status:   Sasha Valdivia has a completely normal functional assessment. See flowsheet for details.        Depression Screening (PHQ-2/PHQ-9): PHQ-2 SCORE: 0  , done 3/31/2024        <5 minutes spent screening and counseling for depression    Advanced Directives:   She does NOT have a Living Will. [Do you have a living will?: No]  She does NOT have a Power of  for Health Care. [Do you have a healthcare power of ?: No]  Not discussed      Patient Active Problem List   Diagnosis    Multiple thyroid nodules    Vitamin D deficiency    Primary osteoarthritis of both knees    Pes planus of both feet    Primary osteoarthritis of both feet    Chronic gout without tophus, unspecified cause, unspecified site    S/P subtotal parathyroidectomy    Hyperlipidemia, unspecified hyperlipidemia type    History of adenomatous polyp of colon    Essential hypertension with goal blood pressure less than 140/90    Morbid obesity, unspecified obesity type (HCC)    Allergic rhinitis, unspecified seasonality, unspecified trigger    Malignant neoplasm of left female breast, unspecified estrogen receptor status, unspecified site of breast  (HCC)    Osteopenia, unspecified location    WIL (obstructive sleep apnea)    Status post bilateral knee replacements    Stage 3b chronic kidney disease (HCC)    Type 2 diabetes mellitus with stage 3b chronic kidney disease, without long-term current use of insulin (HCC)    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 and stage 3b chronic kidney disease (HCC)    Tortuous aorta (HCC)    Moderate pulmonary hypertension (HCC)     Allergies:  She is allergic to pcn [penicillins].    Current Medications:  Outpatient Medications Marked as Taking for the 3/28/24 encounter (Office Visit) with Hans Albarran MD   Medication Sig    semaglutide 4 MG/3ML Subcutaneous Solution Pen-injector Inject 1 mg into the skin once a week.    insulin aspart 100 Units/mL Subcutaneous Solution Pen-injector Inject into the skin.    lisinopril 10 MG Oral Tab Take 1 tablet (10 mg total) by mouth daily.    Empagliflozin (JARDIANCE) 25 MG Oral Tab Take 1 tablet by mouth daily.    metFORMIN HCl 1000 MG Oral Tab Take 1 tablet (1,000 mg total) by mouth 2 (two) times daily with meals.    atorvastatin (LIPITOR) 40 MG Oral Tab Take 1 tablet (40 mg total) by mouth nightly.    torsemide 5 MG Oral Tab Take 1 tablet (5 mg total) by mouth daily.    Insulin Pen Needle (BD PEN NEEDLE BRYSON U/F) 32G X 4 MM Does not apply Misc Use daily with Toujeo    OneTouch Delica Lancets 33G Does not apply Misc TEST BLOOD SUGAR DAILY Non-Insulin Dependent Diabetes Type II. E11.9.    Blood Glucose Monitoring Suppl (ONETOUCH ULTRA 2) w/Device Does not apply Kit TEST BLOOD SUGAR ONCE DAILY Non-Insulin Dependent Diabetes Type II. E11.9.    Glucose Blood (ONETOUCH ULTRA) In Vitro Strip TEST BLOOD SUGAR ONCE DAILY Non-Insulin Dependent Diabetes Type II. E11.9.    Calcium Carbonate-Vitamin D (CALCIUM + D OR) Take 1 tablet by mouth daily.    aspirin 81 MG Oral Tab Take 1 tablet (81 mg total) by mouth daily.       Medical History:  She  has a  past medical history of Arthritis, Cancer (HCC), High blood pressure, High cholesterol, WIL (obstructive sleep apnea) (2/6/17-Split), Type II or unspecified type diabetes mellitus without mention of complication, not stated as uncontrolled, Unspecified essential hypertension, and Visual impairment.  Surgical History:  She  has a past surgical history that includes colonoscopy & polypectomy (8/15); colonoscopy,remv lesn,snare (N/A, 8/19/2015); colonoscopy,biopsy (8/19/2015); parathyroidectomy; total knee replacement (Left, 11/22/16); total knee replacement (Right, 2/28/17); colonoscopy (N/A, 2/14/2019); and colonoscopy (N/A, 1/12/2024).   Family History:  Her family history includes Arthritis in her mother; Diabetes in her mother.  Social History:  She  reports that she has never smoked. She has never used smokeless tobacco. She reports current alcohol use. She reports that she does not use drugs.    Tobacco:  She has never smoked tobacco.    CAGE Alcohol Screen:   CAGE screening score of 0 on 3/21/2024, showing low risk of alcohol abuse.      Patient Care Team:  Hans Albarran MD as PCP - General (Family Medicine)  Tyler Khan (SURGERY, GENERAL)  Maria Esther Jenkins MD (ONCOLOGY)  Marilynn Hale Md (SURGERY, VASCULAR)  Asya Prescott (Radiology)  Tyler He MD (OPHTHALMOLOGY)  Ismael Vazquez MD (GASTROENTEROLOGY)    Review of Systems     Negative except as in HPI    Objective:   Physical Exam    /82   Pulse 89   Resp 18   Ht 5' 8.5\" (1.74 m)   Wt 297 lb (134.7 kg)   SpO2 98%   BMI 44.50 kg/m²  Estimated body mass index is 44.5 kg/m² as calculated from the following:    Height as of this encounter: 5' 8.5\" (1.74 m).    Weight as of this encounter: 297 lb (134.7 kg).    GENERAL: Well developed, well nourished,in no apparent distress  SKIN: No rash  EYES: PERRLA, EOMI, conjunctiva are clear  HEENT: atraumatic, normocephalic  LUNGS: normal work of breathing   CARDIO: Regular  rate    Medicare Hearing Assessment:   Hearing Screening    Entry User: Chelsie Nash CMA  Screening Method: Finger Rub  Finger Rub Result: Pass         Visual Acuity:   Right Eye Visual Acuity: Corrected Right Eye Chart Acuity: 20/25   Left Eye Visual Acuity: Corrected Left Eye Chart Acuity: 20/25     Both Eyes Chart Acuity: 20/30   Able To Tolerate Visual Acuity: Yes        Assessment & Plan:   Sasha Valdivia is a 79 year old female who presents for a Medicare Assessment.     Encounter for annual health examination  We discussed age appropriate health and wellness. Discussed options to aide in increased exercise [facility] or restarting PT. She wishes to defer at this time. Will continue to address at follow-up    Type 2 diabetes mellitus with stage 3b chronic kidney disease, without long-term current use of insulin (HCC)  Morbid obesity, unspecified obesity type (HCC)  Improved glycemic control on current treatment.  Follow-up in 3 months.  - HEMOGLOBIN A1C    Stage 3b chronic kidney disease (HCC)  Renal function overall stable.    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  Continue emphasis on risk factor optimization.  Compensated cardiac status.    Moderate pulmonary hypertension (HCC)  WIL (obstructive sleep apnea)  Stable on CPAP.  Following with pulmonology.    Malignant neoplasm of left female breast, unspecified estrogen receptor status, unspecified site of breast (HCC)  [facility] at [facility].     Essential hypertension with goal blood pressure less than 140/90  Controlled    Hyperlipidemia, unspecified hyperlipidemia type  Continue statin.     Multiple thyroid nodules  S/P subtotal parathyroidectomy  Following with ENT    Vitamin D deficiency  Continue supplementation    History of  adenomatous polyp of colon  Last colonoscopy in Jan. No further colonoscopy recommend at this time.     Primary osteoarthritis of both knees  Status post bilateral knee replacements  Overall doing well. Consider restarting PT    Primary osteoarthritis of both feet  Pes planus of both feet  Stable    Osteopenia, unspecified location  Continue calcium and Vit D supplementation    Chronic gout without tophus  Stable. CTM    Allergic rhinitis  Stable. CTM    Follow-up in 3 months.    The patient indicates understanding of these issues and agrees to the plan.  Reinforced healthy diet, lifestyle, and exercise.      Hans Albarran MD, 3/28/2024     Supplementary Documentation:   General Health:  In the past six months, have you lost more than 10 pounds without trying?: 2 - No  Has your appetite been poor?: No  Type of Diet: Diabetic  How does the patient maintain a good energy level?: Appropriate Exercise  How would you describe your daily physical activity?: Light  How would you describe your current health state?: Fair  How do you maintain positive mental well-being?: Social Interaction;Visiting Friends;Visiting Family  On a scale of 0 to 10, with 0 being no pain and 10 being severe pain, what is your pain level?: 1 - (Mild)  In the past six months, have you experienced urine leakage?: 0-No  At any time do you feel concerned for the safety/well-being of yourself and/or your children, in your home or elsewhere?: No  Have you had any immunizations at another office such as Influenza, Hepatitis B, Tetanus, or Pneumococcal?: No         Sasha Martinez Reno's SCREENING SCHEDULE   Tests on this list are recommended by your physician but may not be covered, or covered at this frequency, by your insurer.   Please check with your insurance carrier before scheduling to verify coverage.   PREVENTATIVE SERVICES FREQUENCY &  COVERAGE DETAILS LAST COMPLETION DATE   Diabetes Screening    Fasting Blood Sugar /  Glucose    One  screening every 12 months if never tested or if previously tested but not diagnosed with pre-diabetes   One screening every 6 months if diagnosed with pre-diabetes Lab Results   Component Value Date    GLUCOSE 148 (H) 12/17/2014     (H) 01/09/2024        Cardiovascular Disease Screening    Lipid Panel  Cholesterol  Lipoprotein (HDL)  Triglycerides Covered every 5 years for all Medicare beneficiaries without apparent signs or symptoms of cardiovascular disease Lab Results   Component Value Date    CHOLEST 171 01/09/2024    HDL 42 01/09/2024    LDL 98 01/09/2024    TRIG 179 (H) 01/09/2024         Electrocardiogram (EKG)   Covered if needed at Welcome to Medicare, and non-screening if indicated for medical reasons 11/30/2016      Ultrasound Screening for Abdominal Aortic Aneurysm (AAA) Covered once in a lifetime for one of the following risk factors    Men who are 65-75 years old and have ever smoked    Anyone with a family history -     Colorectal Cancer Screening  Covered for ages 50-85; only need ONE of the following:    Colonoscopy   Covered every 10 years    Covered every 2 years if patient is at high risk or previous colonoscopy was abnormal 01/12/2024    Health Maintenance   Topic Date Due    Colorectal Cancer Screening  Discontinued       Flexible Sigmoidoscopy   Covered every 4 years -    Fecal Occult Blood Test Covered annually -   Bone Density Screening    Bone density screening    Covered every 2 years after age 65 if diagnosed with risk of osteoporosis or estrogen deficiency.    Covered yearly for long-term glucocorticoid medication use (Steroids) No results found for this or any previous visit.      No recommendations at this time   Pap and Pelvic    Pap   Covered every 2 years for women at normal risk; Annually if at high risk -  No recommendations at this time    Chlamydia Annually if high risk -  No recommendations at this time   Screening Mammogram    Mammogram     Recommend annually for all  female patients aged 40 and older    One baseline mammogram covered for patients aged 35-39 10/31/2023    Health Maintenance   Topic Date Due    Mammogram  10/31/2024       Immunizations    Influenza Covered once per flu season  Please get every year 09/21/2023  No recommendations at this time    Pneumococcal Each vaccine (Uybixar56 & [id number]) covered once after 65 Prevnar 13: 03/27/2015    Qekhtyyfv84: 01/06/2012     No recommendations at this time    Hepatitis B One screening covered for patients with certain risk factors   -  No recommendations at this time    Tetanus Toxoid Not covered by Medicare Part B unless medically necessary (cut with metal); may be covered with your pharmacy prescription benefits 01/01/2004    Tetanus, Diptheria and Pertusis TD and TDaP Not covered by Medicare Part B -  No recommendations at this time    Zoster Not covered by Medicare Part B; may be covered with your pharmacy  prescription benefits 01/01/2008  No recommendations at this time     Diabetes      Hemoglobin A1C Annually; if last result is elevated, may be asked to retest more frequently.    Medicare covers every 3 months Lab Results   Component Value Date     (H) 01/09/2024    A1C 6.9 (A) 03/28/2024       No recommendations at this time    Creat/alb ratio Annually Lab Results   Component Value Date    MICROALBCREA  10/06/2021      Comment:      Unable to calculate due to Urine Microalbumin <1.2 mg/dL        LDL Annually Lab Results   Component Value Date    LDL 98 01/09/2024       Dilated Eye Exam Annually Last Diabetic Eye Exam:  Last Dilated Eye Exam: 07/05/23  Eye Exam shows Diabetic Retinopathy?: No       Annual Monitoring of Persistent Medications (ACE/ARB, digoxin diuretics, anticonvulsants)    Potassium Annually Lab Results   Component Value Date    K 4.0 01/09/2024         Creatinine   Annually Lab Results   Component Value Date    CREATSERUM 1.05 (H) 01/09/2024         BUN Annually Lab Results   Component  Value Date    BUN 23 01/09/2024       Drug Serum Conc Annually No results found for: \"DIGOXIN\", \"DIG\", \"VALP\"

## 2024-03-28 NOTE — PATIENT INSTRUCTIONS
Sasha Martinez Valdivia's SCREENING SCHEDULE   Tests on this list are recommended by your physician but may not be covered, or covered at this frequency, by your insurer.   Please check with your insurance carrier before scheduling to verify coverage.   PREVENTATIVE SERVICES FREQUENCY &  COVERAGE DETAILS LAST COMPLETION DATE   Diabetes Screening    Fasting Blood Sugar /  Glucose    One screening every 12 months if never tested or if previously tested but not diagnosed with pre-diabetes   One screening every 6 months if diagnosed with pre-diabetes Lab Results   Component Value Date    GLUCOSE 148 (H) 12/17/2014     (H) 01/09/2024        Cardiovascular Disease Screening    Lipid Panel  Cholesterol  Lipoprotein (HDL)  Triglycerides Covered every 5 years for all Medicare beneficiaries without apparent signs or symptoms of cardiovascular disease Lab Results   Component Value Date    CHOLEST 171 01/09/2024    HDL 42 01/09/2024    LDL 98 01/09/2024    TRIG 179 (H) 01/09/2024         Electrocardiogram (EKG)   Covered if needed at Welcome to Medicare, and non-screening if indicated for medical reasons 11/30/2016      Ultrasound Screening for Abdominal Aortic Aneurysm (AAA) Covered once in a lifetime for one of the following risk factors   • Men who are 65-75 years old and have ever smoked   • Anyone with a family history -     Colorectal Cancer Screening  Covered for ages 50-85; only need ONE of the following:    Colonoscopy   Covered every 10 years    Covered every 2 years if patient is at high risk or previous colonoscopy was abnormal 01/12/2024    Health Maintenance   Topic Date Due   • Colorectal Cancer Screening  Discontinued       Flexible Sigmoidoscopy   Covered every 4 years -    Fecal Occult Blood Test Covered annually -   Bone Density Screening    Bone density screening    Covered every 2 years after age 65 if diagnosed with risk of osteoporosis or estrogen deficiency.    Covered yearly for long-term  glucocorticoid medication use (Steroids) No results found for this or any previous visit.      No recommendations at this time   Pap and Pelvic    Pap   Covered every 2 years for women at normal risk; Annually if at high risk -  No recommendations at this time    Chlamydia Annually if high risk -  No recommendations at this time   Screening Mammogram    Mammogram     Recommend annually for all female patients aged 40 and older    One baseline mammogram covered for patients aged 35-39 10/31/2023    Health Maintenance   Topic Date Due   • Mammogram  10/31/2024       Immunizations    Influenza Covered once per flu season  Please get every year 09/21/2023  No recommendations at this time    Pneumococcal Each vaccine (Cwcwsbl51 & Jzhsgwcxh20) covered once after 65 Prevnar 13: 03/27/2015    Gkllqgsnb14: 01/06/2012     No recommendations at this time    Hepatitis B One screening covered for patients with certain risk factors   -  No recommendations at this time    Tetanus Toxoid Not covered by Medicare Part B unless medically necessary (cut with metal); may be covered with your pharmacy prescription benefits 01/01/2004    Tetanus, Diptheria and Pertusis TD and TDaP Not covered by Medicare Part B -  No recommendations at this time    Zoster Not covered by Medicare Part B; may be covered with your pharmacy  prescription benefits 01/01/2008  No recommendations at this time     Diabetes      Hemoglobin A1C Annually; if last result is elevated, may be asked to retest more frequently.    Medicare covers every 3 months Lab Results   Component Value Date     (H) 01/09/2024    A1C 6.9 (A) 03/28/2024       No recommendations at this time    Creat/alb ratio Annually Lab Results   Component Value Date    MICROALBCREA  10/06/2021      Comment:      Unable to calculate due to Urine Microalbumin <1.2 mg/dL        LDL Annually Lab Results   Component Value Date    LDL 98 01/09/2024       Dilated Eye Exam Annually [unfilled]      Annual Monitoring of Persistent Medications (ACE/ARB, digoxin diuretics, anticonvulsants)    Potassium Annually Lab Results   Component Value Date    K 4.0 01/09/2024         Creatinine   Annually Lab Results   Component Value Date    CREATSERUM 1.05 (H) 01/09/2024         BUN Annually Lab Results   Component Value Date    BUN 23 01/09/2024       Drug Serum Conc Annually No results found for: \"DIGOXIN\", \"DIG\", \"VALP\"

## 2024-04-12 NOTE — TELEPHONE ENCOUNTER
Which [facility]: [facility]    Prescription Refill Request - Patient advised can take 48-72 hours.      Name of Medication (strength, dose, qty requested:     Jose Delucaar should be for 64 units from the last prescribed needs to [facility].

## 2024-04-15 RX ORDER — INSULIN GLARGINE 300 U/ML
64 INJECTION, SOLUTION SUBCUTANEOUS DAILY
Qty: 20 ML | Refills: 0 | Status: SHIPPED | OUTPATIENT
Start: 2024-04-15

## 2024-04-15 NOTE — TELEPHONE ENCOUNTER
Requested Prescriptions     Pending Prescriptions Disp Refills    Insulin Glargine, 1 Unit Dial, (TOUJEO SOLOSTAR) 300 UNIT/ML Subcutaneous Solution Pen-injector  0     Sig: Inject into the skin.       LOV: 3--mk-physical    Last Labs: 1-9-2024-lipid,cmp        Your appointments       Date & Time Appointment Department ([place])    Jun 28, 2024 9:00 AM CDT Follow Up Visit with Hans Albarran MD [address] ([facility])              [address]  [facility]  [address] [place] [id number] [phone]

## 2024-04-18 ENCOUNTER — TELEPHONE (OUTPATIENT)
Dept: HEMATOLOGY/ONCOLOGY | Age: 79
End: 2024-04-18

## 2024-04-22 DIAGNOSIS — M85.80 OSTEOPENIA, UNSPECIFIED LOCATION: Primary | ICD-10-CM

## 2024-04-22 RX ORDER — ALBUTEROL SULFATE 2.5 MG/3ML
5 SOLUTION RESPIRATORY (INHALATION)
OUTPATIENT
Start: 2024-04-22

## 2024-04-22 RX ORDER — ZOLEDRONIC ACID 5 MG/100ML
5 INJECTION, SOLUTION INTRAVENOUS ONCE
OUTPATIENT
Start: 2024-04-22 | End: 2024-04-22

## 2024-04-22 RX ORDER — FAMOTIDINE 10 MG/ML
20 INJECTION, SOLUTION INTRAVENOUS
OUTPATIENT
Start: 2024-04-22

## 2024-04-22 RX ORDER — ALBUTEROL SULFATE 90 UG/1
2 AEROSOL, METERED RESPIRATORY (INHALATION)
OUTPATIENT
Start: 2024-04-22

## 2024-04-22 RX ORDER — DIPHENHYDRAMINE HYDROCHLORIDE 50 MG/ML
50 INJECTION INTRAMUSCULAR; INTRAVENOUS
Start: 2024-04-22

## 2024-04-24 ENCOUNTER — OFFICE VISIT (OUTPATIENT)
Dept: HEMATOLOGY/ONCOLOGY | Age: 79
End: 2024-04-24
Attending: INTERNAL MEDICINE

## 2024-04-24 ENCOUNTER — TELEPHONE (OUTPATIENT)
Dept: HEMATOLOGY/ONCOLOGY | Age: 79
End: 2024-04-24

## 2024-04-24 ENCOUNTER — LAB SERVICES (OUTPATIENT)
Dept: LAB | Age: 79
End: 2024-04-24
Attending: INTERNAL MEDICINE

## 2024-04-24 VITALS
TEMPERATURE: 98.2 F | SYSTOLIC BLOOD PRESSURE: 117 MMHG | RESPIRATION RATE: 16 BRPM | OXYGEN SATURATION: 94 % | WEIGHT: 293 LBS | DIASTOLIC BLOOD PRESSURE: 63 MMHG | HEART RATE: 91 BPM | BODY MASS INDEX: 43.4 KG/M2 | HEIGHT: 69 IN

## 2024-04-24 DIAGNOSIS — M79.89 SOFT TISSUE MASS: Primary | ICD-10-CM

## 2024-04-24 DIAGNOSIS — Z17.0 MALIGNANT NEOPLASM OF LOWER-INNER QUADRANT OF LEFT BREAST IN FEMALE, ESTROGEN RECEPTOR POSITIVE  (CMD): ICD-10-CM

## 2024-04-24 DIAGNOSIS — M85.80 OSTEOPENIA, UNSPECIFIED LOCATION: ICD-10-CM

## 2024-04-24 DIAGNOSIS — C50.312 MALIGNANT NEOPLASM OF LOWER-INNER QUADRANT OF LEFT BREAST IN FEMALE, ESTROGEN RECEPTOR POSITIVE  (CMD): ICD-10-CM

## 2024-04-24 LAB
25(OH)D3+25(OH)D2 SERPL-MCNC: 26 NG/ML (ref 30–100)
ALBUMIN SERPL-MCNC: 3.4 G/DL (ref 3.6–5.1)
ALBUMIN/GLOB SERPL: 0.9 {RATIO} (ref 1–2.4)
ALP SERPL-CCNC: 81 UNITS/L (ref 45–117)
ALT SERPL-CCNC: 28 UNITS/L
ANION GAP SERPL CALC-SCNC: 6 MMOL/L (ref 7–19)
AST SERPL-CCNC: 13 UNITS/L
BASOPHILS # BLD: 0.1 K/MCL (ref 0–0.3)
BASOPHILS NFR BLD: 1 %
BILIRUB SERPL-MCNC: 0.4 MG/DL (ref 0.2–1)
BUN SERPL-MCNC: 24 MG/DL (ref 6–20)
BUN/CREAT SERPL: 25 (ref 7–25)
CALCIUM SERPL-MCNC: 9.3 MG/DL (ref 8.4–10.2)
CEA SERPL-MCNC: <2 NG/ML (ref 0–5)
CHLORIDE SERPL-SCNC: 109 MMOL/L (ref 97–110)
CO2 SERPL-SCNC: 31 MMOL/L (ref 21–32)
CREAT SERPL-MCNC: 0.95 MG/DL (ref 0.51–0.95)
DEPRECATED RDW RBC: 50 FL (ref 39–50)
EGFRCR SERPLBLD CKD-EPI 2021: 61 ML/MIN/{1.73_M2}
EOSINOPHIL # BLD: 0.2 K/MCL (ref 0–0.5)
EOSINOPHIL NFR BLD: 2 %
ERYTHROCYTE [DISTWIDTH] IN BLOOD: 14.2 % (ref 11–15)
FASTING DURATION TIME PATIENT: ABNORMAL H
GLOBULIN SER-MCNC: 3.9 G/DL (ref 2–4)
GLUCOSE SERPL-MCNC: 190 MG/DL (ref 70–99)
HCT VFR BLD CALC: 48.2 % (ref 36–46.5)
HGB BLD-MCNC: 14.8 G/DL (ref 12–15.5)
IMM GRANULOCYTES # BLD AUTO: 0 K/MCL (ref 0–0.2)
IMM GRANULOCYTES # BLD: 0 %
LYMPHOCYTES # BLD: 1.5 K/MCL (ref 1–4)
LYMPHOCYTES NFR BLD: 18 %
MCH RBC QN AUTO: 29.2 PG (ref 26–34)
MCHC RBC AUTO-ENTMCNC: 30.7 G/DL (ref 32–36.5)
MCV RBC AUTO: 95.3 FL (ref 78–100)
MONOCYTES # BLD: 0.5 K/MCL (ref 0.3–0.9)
MONOCYTES NFR BLD: 6 %
NEUTROPHILS # BLD: 6 K/MCL (ref 1.8–7.7)
NEUTROPHILS NFR BLD: 73 %
NRBC BLD MANUAL-RTO: 0 /100 WBC
PLATELET # BLD AUTO: 217 K/MCL (ref 140–450)
POTASSIUM SERPL-SCNC: 4.3 MMOL/L (ref 3.4–5.1)
PROT SERPL-MCNC: 7.3 G/DL (ref 6.4–8.2)
RBC # BLD: 5.06 MIL/MCL (ref 4–5.2)
SODIUM SERPL-SCNC: 142 MMOL/L (ref 135–145)
WBC # BLD: 8.2 K/MCL (ref 4.2–11)

## 2024-04-24 PROCEDURE — 86300 IMMUNOASSAY TUMOR CA 15-3: CPT

## 2024-04-24 PROCEDURE — 99215 OFFICE O/P EST HI 40 MIN: CPT | Performed by: INTERNAL MEDICINE

## 2024-04-24 PROCEDURE — 80053 COMPREHEN METABOLIC PANEL: CPT

## 2024-04-24 PROCEDURE — 36415 COLL VENOUS BLD VENIPUNCTURE: CPT

## 2024-04-24 PROCEDURE — 82378 CARCINOEMBRYONIC ANTIGEN: CPT

## 2024-04-24 PROCEDURE — G2211 COMPLEX E/M VISIT ADD ON: HCPCS | Performed by: INTERNAL MEDICINE

## 2024-04-24 PROCEDURE — 85025 COMPLETE CBC W/AUTO DIFF WBC: CPT

## 2024-04-24 PROCEDURE — 82306 VITAMIN D 25 HYDROXY: CPT

## 2024-04-24 RX ORDER — SEMAGLUTIDE 1.34 MG/ML
1 INJECTION, SOLUTION SUBCUTANEOUS
COMMUNITY

## 2024-04-24 ASSESSMENT — ENCOUNTER SYMPTOMS
CONFUSION: 0
SHORTNESS OF BREATH: 0
SLEEP DISTURBANCE: 0
ACTIVITY CHANGE: 0
BLOOD IN STOOL: 0
DIAPHORESIS: 0
WEAKNESS: 0
SPEECH DIFFICULTY: 0
DIARRHEA: 0
WHEEZING: 0
ABDOMINAL PAIN: 0
CONSTIPATION: 0
BACK PAIN: 0
BRUISES/BLEEDS EASILY: 0
HEADACHES: 0
CHILLS: 0
ADENOPATHY: 0
DIZZINESS: 0
CHOKING: 0
APNEA: 0
FEVER: 0
CHEST TIGHTNESS: 0
ABDOMINAL DISTENTION: 0
NAUSEA: 0
TROUBLE SWALLOWING: 0
UNEXPECTED WEIGHT CHANGE: 0
LIGHT-HEADEDNESS: 0
APPETITE CHANGE: 0
FATIGUE: 0
VOMITING: 0
VOICE CHANGE: 0
COUGH: 0

## 2024-04-24 ASSESSMENT — PATIENT HEALTH QUESTIONNAIRE - PHQ9
CLINICAL INTERPRETATION OF PHQ2 SCORE: NO FURTHER SCREENING NEEDED
SUM OF ALL RESPONSES TO PHQ9 QUESTIONS 1 AND 2: 0
SUM OF ALL RESPONSES TO PHQ9 QUESTIONS 1 AND 2: 0
1. LITTLE INTEREST OR PLEASURE IN DOING THINGS: NOT AT ALL
2. FEELING DOWN, DEPRESSED OR HOPELESS: NOT AT ALL

## 2024-04-24 ASSESSMENT — PAIN SCALES - GENERAL: PAINLEVEL: 0

## 2024-04-25 LAB
CANCER AG15-3 SERPL-ACNC: 10 UNITS/ML (ref 0–32)
CANCER AG27-29 SERPL-ACNC: 17.4 UNITS/ML (ref 0–40)

## 2024-05-13 DIAGNOSIS — N18.32 TYPE 2 DIABETES MELLITUS WITH STAGE 3B CHRONIC KIDNEY DISEASE, WITHOUT LONG-TERM CURRENT USE OF INSULIN (HCC): ICD-10-CM

## 2024-05-13 DIAGNOSIS — E11.22 TYPE 2 DIABETES MELLITUS WITH STAGE 3B CHRONIC KIDNEY DISEASE, WITHOUT LONG-TERM CURRENT USE OF INSULIN (HCC): ICD-10-CM

## 2024-05-13 DIAGNOSIS — E66.01 MORBID OBESITY, UNSPECIFIED OBESITY TYPE (HCC): ICD-10-CM

## 2024-05-14 RX ORDER — SEMAGLUTIDE 1.34 MG/ML
1 INJECTION, SOLUTION SUBCUTANEOUS WEEKLY
Qty: 9 ML | Refills: 3 | Status: SHIPPED | OUTPATIENT
Start: 2024-05-14

## 2024-05-14 NOTE — TELEPHONE ENCOUNTER
Refill passed per [facility] protocol.  Requested Prescriptions   Pending Prescriptions Disp Refills    OZEMPIC, 1 MG/DOSE, 4 MG/3ML Subcutaneous Solution Pen-injector [Pharmacy Med Name: OZEMPIC PEN (1MG/DOSE) 3ML 4MG/3ML] 9 mL 3     Sig: INJECT 1 MG UNDER THE SKIN WEEKLY       Diabetes Medication Protocol Passed - 5/13/2024  8:43 AM        Passed - Last A1C < 7.5 and within past 6 months     Lab Results   Component Value Date    A1C 6.9 (A) 03/28/2024             Passed - In person appointment or virtual visit in the past 6 mos or appointment in next 3 mos     Recent Outpatient Visits              1 month ago Encounter for annual health examination    [facility], [address] [place], [place] [address]Rupali Michael, MD    Office Visit    1 month ago Hypertrophy tonsils    [facility], [place], Joao Anderson MD    Office Visit    2 months ago Enlarged tonsils    [address]    Office Visit    4 months ago Type 2 diabetes mellitus with stage 3b chronic kidney disease, with long-term current use of insulin ([facility])    [address]    Office Visit    6 months ago Type 2 diabetes mellitus with stage 3b chronic kidney disease, with long-term current use of insulin ([facility])    [address]    Office Visit          Future Appointments         Provider Department Appt Notes    In 1 month Hans Albarran MD [address] [place] f/u                    Passed - Microalbumin procedure in past 12 months or taking ACE/ARB        Passed - EGFRCR or GFRNAA > 50     GFR Evaluation  EGFRCR: 54 , resulted on 1/9/2024          Passed - GFR in the past 12 months           Recent Outpatient Visits              1 month ago Encounter for annual health examination    [place]  [facility], [address] [place], [place] [address]Rupali Michael, MD    Office Visit    1 month ago Hypertrophy tonsils    [email or web address] [place], Joao Anderson MD    Office Visit    2 months ago Enlarged tonsils    [address]    Office Visit    4 months ago Type 2 diabetes mellitus with stage 3b chronic kidney disease, with long-term current use of insulin ([facility])    [address]    Office Visit    6 months ago Type 2 diabetes mellitus with stage 3b chronic kidney disease, with long-term current use of insulin ([facility])    [address]    Office Visit          Future Appointments         Provider Department Appt Notes    In 1 month Hans Albarran MD [address] f[place]u

## 2024-05-16 DIAGNOSIS — N18.32 TYPE 2 DIABETES MELLITUS WITH STAGE 3B CHRONIC KIDNEY DISEASE, WITHOUT LONG-TERM CURRENT USE OF INSULIN (HCC): ICD-10-CM

## 2024-05-16 DIAGNOSIS — E66.01 MORBID OBESITY, UNSPECIFIED OBESITY TYPE (HCC): ICD-10-CM

## 2024-05-16 DIAGNOSIS — E11.22 TYPE 2 DIABETES MELLITUS WITH STAGE 3B CHRONIC KIDNEY DISEASE, WITHOUT LONG-TERM CURRENT USE OF INSULIN (HCC): ICD-10-CM

## 2024-05-17 ENCOUNTER — HOSPITAL ENCOUNTER (OUTPATIENT)
Dept: ULTRASOUND IMAGING | Age: 79
End: 2024-05-17
Attending: INTERNAL MEDICINE

## 2024-05-17 DIAGNOSIS — M79.89 SOFT TISSUE MASS: ICD-10-CM

## 2024-05-17 PROCEDURE — 76705 ECHO EXAM OF ABDOMEN: CPT

## 2024-05-20 RX ORDER — SEMAGLUTIDE 1.34 MG/ML
1 INJECTION, SOLUTION SUBCUTANEOUS WEEKLY
Qty: 9 ML | Refills: 3 | OUTPATIENT
Start: 2024-05-20

## 2024-05-21 ENCOUNTER — HOSPITAL ENCOUNTER (OUTPATIENT)
Dept: INFUSION THERAPY | Age: 79
Discharge: STILL ADMITTED OR ADMINISTRATIVE | End: 2024-05-21
Attending: INTERNAL MEDICINE

## 2024-05-21 ENCOUNTER — TELEPHONE (OUTPATIENT)
Dept: HEMATOLOGY/ONCOLOGY | Age: 79
End: 2024-05-21

## 2024-05-21 VITALS
SYSTOLIC BLOOD PRESSURE: 148 MMHG | OXYGEN SATURATION: 94 % | TEMPERATURE: 97.6 F | WEIGHT: 293 LBS | DIASTOLIC BLOOD PRESSURE: 77 MMHG | RESPIRATION RATE: 16 BRPM | BODY MASS INDEX: 43.49 KG/M2 | HEART RATE: 85 BPM

## 2024-05-21 DIAGNOSIS — M85.80 OSTEOPENIA, UNSPECIFIED LOCATION: Primary | ICD-10-CM

## 2024-05-21 PROCEDURE — 10002800 HB RX 250 W HCPCS: Performed by: INTERNAL MEDICINE

## 2024-05-21 PROCEDURE — 96365 THER/PROPH/DIAG IV INF INIT: CPT

## 2024-05-21 PROCEDURE — 10002807 HB RX 258: Performed by: INTERNAL MEDICINE

## 2024-05-21 RX ORDER — ZOLEDRONIC ACID 5 MG/100ML
5 INJECTION, SOLUTION INTRAVENOUS ONCE
Status: COMPLETED | OUTPATIENT
Start: 2024-05-21 | End: 2024-05-21

## 2024-05-21 RX ADMIN — ZOLEDRONIC ACID 5 MG: 5 INJECTION, SOLUTION INTRAVENOUS at 10:34

## 2024-05-21 RX ADMIN — SODIUM CHLORIDE 250 ML: 9 INJECTION, SOLUTION INTRAVENOUS at 10:33

## 2024-05-21 ASSESSMENT — PAIN SCALES - GENERAL: PAINLEVEL_OUTOF10: 0

## 2024-06-28 ENCOUNTER — OFFICE VISIT (OUTPATIENT)
Dept: INTERNAL MEDICINE CLINIC | Facility: CLINIC | Age: 79
End: 2024-06-28

## 2024-06-28 ENCOUNTER — LAB ENCOUNTER (OUTPATIENT)
Dept: LAB | Age: 79
End: 2024-06-28
Attending: FAMILY MEDICINE

## 2024-06-28 VITALS
WEIGHT: 293 LBS | TEMPERATURE: 97 F | BODY MASS INDEX: 43.4 KG/M2 | RESPIRATION RATE: 20 BRPM | SYSTOLIC BLOOD PRESSURE: 128 MMHG | HEIGHT: 69 IN | DIASTOLIC BLOOD PRESSURE: 82 MMHG | OXYGEN SATURATION: 93 % | HEART RATE: 97 BPM

## 2024-06-28 DIAGNOSIS — N18.32 TYPE 2 DIABETES MELLITUS WITH STAGE 3B CHRONIC KIDNEY DISEASE, WITHOUT LONG-TERM CURRENT USE OF INSULIN (HCC): Primary | ICD-10-CM

## 2024-06-28 DIAGNOSIS — Z79.4 TYPE 2 DIABETES MELLITUS WITH STAGE 3B CHRONIC KIDNEY DISEASE, WITH LONG-TERM CURRENT USE OF INSULIN (HCC): ICD-10-CM

## 2024-06-28 DIAGNOSIS — M79.644 CHRONIC PAIN OF RIGHT THUMB: ICD-10-CM

## 2024-06-28 DIAGNOSIS — B07.9 WART OF HAND: ICD-10-CM

## 2024-06-28 DIAGNOSIS — H25.13 AGE-RELATED NUCLEAR CATARACT OF BOTH EYES: ICD-10-CM

## 2024-06-28 DIAGNOSIS — G89.29 CHRONIC PAIN OF RIGHT THUMB: ICD-10-CM

## 2024-06-28 DIAGNOSIS — I10 ESSENTIAL HYPERTENSION WITH GOAL BLOOD PRESSURE LESS THAN 140/90: ICD-10-CM

## 2024-06-28 DIAGNOSIS — N18.32 TYPE 2 DIABETES MELLITUS WITH STAGE 3B CHRONIC KIDNEY DISEASE, WITH LONG-TERM CURRENT USE OF INSULIN (HCC): ICD-10-CM

## 2024-06-28 DIAGNOSIS — E11.22 TYPE 2 DIABETES MELLITUS WITH STAGE 3B CHRONIC KIDNEY DISEASE, WITHOUT LONG-TERM CURRENT USE OF INSULIN (HCC): Primary | ICD-10-CM

## 2024-06-28 DIAGNOSIS — E11.22 TYPE 2 DIABETES MELLITUS WITH STAGE 3B CHRONIC KIDNEY DISEASE, WITH LONG-TERM CURRENT USE OF INSULIN (HCC): ICD-10-CM

## 2024-06-28 DIAGNOSIS — M85.80 OSTEOPENIA, UNSPECIFIED LOCATION: ICD-10-CM

## 2024-06-28 LAB
CREAT UR-SCNC: 78.1 MG/DL
HEMOGLOBIN A1C: 6.6 % (ref 4.3–5.6)
MICROALBUMIN UR-MCNC: 1.62 MG/DL
MICROALBUMIN/CREAT 24H UR-RTO: 20.7 UG/MG (ref ?–30)

## 2024-06-28 PROCEDURE — 1159F MED LIST DOCD IN RCRD: CPT | Performed by: FAMILY MEDICINE

## 2024-06-28 PROCEDURE — 1160F RVW MEDS BY RX/DR IN RCRD: CPT | Performed by: FAMILY MEDICINE

## 2024-06-28 PROCEDURE — 1170F FXNL STATUS ASSESSED: CPT | Performed by: FAMILY MEDICINE

## 2024-06-28 PROCEDURE — 83036 HEMOGLOBIN GLYCOSYLATED A1C: CPT | Performed by: FAMILY MEDICINE

## 2024-06-28 PROCEDURE — 3079F DIAST BP 80-89 MM HG: CPT | Performed by: FAMILY MEDICINE

## 2024-06-28 PROCEDURE — 82570 ASSAY OF URINE CREATININE: CPT

## 2024-06-28 PROCEDURE — 3008F BODY MASS INDEX DOCD: CPT | Performed by: FAMILY MEDICINE

## 2024-06-28 PROCEDURE — G2211 COMPLEX E/M VISIT ADD ON: HCPCS | Performed by: FAMILY MEDICINE

## 2024-06-28 PROCEDURE — 82043 UR ALBUMIN QUANTITATIVE: CPT

## 2024-06-28 PROCEDURE — 3074F SYST BP LT 130 MM HG: CPT | Performed by: FAMILY MEDICINE

## 2024-06-28 PROCEDURE — 99214 OFFICE O/P EST MOD 30 MIN: CPT | Performed by: FAMILY MEDICINE

## 2024-06-28 RX ORDER — LISINOPRIL 10 MG/1
10 TABLET ORAL DAILY
Qty: 90 TABLET | Refills: 3 | Status: SHIPPED | OUTPATIENT
Start: 2024-06-28

## 2024-06-28 RX ORDER — TORSEMIDE 5 MG/1
5 TABLET ORAL DAILY
Qty: 90 TABLET | Refills: 3 | Status: SHIPPED | OUTPATIENT
Start: 2024-06-28

## 2024-06-28 RX ORDER — ATORVASTATIN CALCIUM 40 MG/1
40 TABLET, FILM COATED ORAL NIGHTLY
Qty: 90 TABLET | Refills: 3 | Status: SHIPPED | OUTPATIENT
Start: 2024-06-28

## 2024-06-28 NOTE — PROGRESS NOTES
Sasha Valdivia  2/4/1945    Chief Complaint   Patient presents with    Follow - Up     Rm 1       HPI:   Sasha Valdivia is a 79 year old female who presents for follow-up. She has overall been doing well. She transitioned to Reclast from Prolia due to insurance coverage. She is planning cataract surgery in the summer. She has 2 warts on her right hand and has been having some right thumb pain at the base of the thumb.     Current Outpatient Medications   Medication Sig Dispense Refill    semaglutide (OZEMPIC, 1 MG/DOSE,) 4 MG/3ML Subcutaneous Solution Pen-injector Inject 1 mg into the skin once a week. 9 mL 3    Insulin Glargine, 1 Unit Dial, (TOUJEO SOLOSTAR) 300 UNIT/ML Subcutaneous Solution Pen-injector Inject 64 Units into the skin daily. 20 mL 0    lisinopril 10 MG Oral Tab Take 1 tablet (10 mg total) by mouth daily. 90 tablet 1    Empagliflozin (JARDIANCE) 25 MG Oral Tab Take 1 tablet by mouth daily. 90 tablet 3    metFORMIN HCl 1000 MG Oral Tab Take 1 tablet (1,000 mg total) by mouth 2 (two) times daily with meals. 180 tablet 3    atorvastatin (LIPITOR) 40 MG Oral Tab Take 1 tablet (40 mg total) by mouth nightly. 90 tablet 3    torsemide 5 MG Oral Tab Take 1 tablet (5 mg total) by mouth daily. 90 tablet 3    Insulin Pen Needle (BD PEN NEEDLE BRYSON U/F) 32G X 4 MM Does not apply Misc Use daily with Toujeo 90 each 3    OneTouch Delica Lancets 33G Does not apply Misc TEST BLOOD SUGAR DAILY Non-Insulin Dependent Diabetes Type II. E11.9. 100 each 3    Blood Glucose Monitoring Suppl (ONETOUCH ULTRA 2) w/Device Does not apply Kit TEST BLOOD SUGAR ONCE DAILY Non-Insulin Dependent Diabetes Type II. E11.9. 1 kit 0    Glucose Blood (ONETOUCH ULTRA) In Vitro Strip TEST BLOOD SUGAR ONCE DAILY Non-Insulin Dependent Diabetes Type II. E11.9. 100 strip 3    Calcium Carbonate-Vitamin D (CALCIUM + D OR) Take 1 tablet by mouth daily.      aspirin 81 MG Oral Tab Take 1 tablet (81 mg total) by mouth daily.       insulin aspart 100 Units/mL Subcutaneous Solution Pen-injector Inject into the skin.        Allergies   Allergen Reactions    Pcn [Penicillins] UNKNOWN     Childhood reaction      Past Medical History:    Arthritis    Cancer (HCC)    breast    High blood pressure    High cholesterol    WIL (obstructive sleep apnea)    AHI 92 RDI 95 REM AHI 0 Supine  non-supine AHI 70 Sao2 Seven 79%/CPAP-10cpw w/ 5L O2    Type II or unspecified type diabetes mellitus without mention of complication, not stated as uncontrolled    Unspecified essential hypertension    Visual impairment      Patient Active Problem List   Diagnosis    Multiple thyroid nodules    Vitamin D deficiency    Primary osteoarthritis of both knees    Pes planus of both feet    Primary osteoarthritis of both feet    Chronic gout without tophus, unspecified cause, unspecified site    S/P subtotal parathyroidectomy    Hyperlipidemia, unspecified hyperlipidemia type    History of adenomatous polyp of colon    Essential hypertension with goal blood pressure less than 140/90    Morbid obesity, unspecified obesity type (HCC)    Allergic rhinitis, unspecified seasonality, unspecified trigger    Malignant neoplasm of left female breast, unspecified estrogen receptor status, unspecified site of breast (HCC)    Osteopenia, unspecified location    WIL (obstructive sleep apnea)    Status post bilateral knee replacements    Stage 3b chronic kidney disease (HCC)    Type 2 diabetes mellitus with stage 3b chronic kidney disease, without long-term current use of insulin (HCC)    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 and stage 3b chronic kidney disease (HCC)    Tortuous aorta (HCC)    Moderate pulmonary hypertension (HCC)      Past Surgical History:   Procedure Laterality Date    Colonoscopy N/A 2/14/2019    SSA, adenomas- repeat 3 yrs    Colonoscopy N/A 1/12/2024    Procedure: COLONOSCOPY with forcep and cold snare  polypectomy;  Surgeon: Yahir Cast DO;  Location:  ENDOSCOPY    Colonoscopy & polypectomy  8/15    polyps; tics; repeat 3 yrs (adenoma)    Colonoscopy,biopsy  8/19/2015    Procedure: ;  Surgeon: Ismael Vazquez MD;  Location: [facility]    Colonoscopy,remv lesn,snare N/A 8/19/2015    Procedure: COLONOSCOPY, POSSIBLE BIOPSY, POSSIBLE POLYPECTOMY 78331;  Surgeon: Ismael Vazquez MD;  Location: [facility]    Parathyroidectomy      Total knee replacement Left 11/22/16    Dr Higgins    Total knee replacement Right 2/28/17    Dr Higgins      Family History   Problem Relation Age of Onset    Arthritis Mother     Diabetes Mother       Social History     Socioeconomic History    Marital status:    Tobacco Use    Smoking status: Never    Smokeless tobacco: Never   Vaping Use    Vaping status: Never Used   Substance and Sexual Activity    Alcohol use: Yes     Alcohol/week: 0.0 standard drinks of alcohol     Comment: once or twice a year    Drug use: No         REVIEW OF SYSTEMS:   GENERAL: feels well otherwise, no recent illness.     EXAM:   /82 (BP Location: Left arm, Patient Position: Sitting, Cuff Size: adult)   Pulse 97   Temp 96.7 °F (35.9 °C) (Skin)   Resp 20   Ht 5' 9\" (1.753 m)   Wt 294 lb (133.4 kg)   SpO2 93%   BMI 43.42 kg/m²   GENERAL: Well developed, well nourished,in no apparent distress  SKIN: two warts on the right 2nd and 3rd digit.   EYES: PERRLA, EOMI, conjunctiva are clear  HEENT: atraumatic, normocephalic  CHEST: normal work of breathing  CARDIO: RRR    ASSESSMENT AND PLAN:   Sasha Valdivia is a 79 year old female who presents for follow-up    Type 2 diabetes mellitus with stage 3b chronic kidney disease  A1c continues to improve. Continue current treatment. M/Cr collected pending today. Follow-up in 4 months.   - POC Hgb A1C    Essential hypertension with goal blood pressure less than 140/90  Controlled on current treatment.   -  lisinopril 10 MG Oral Tab; Take 1 tablet (10 mg total) by mouth daily.  Dispense: 90 tablet; Refill: 3    Age-related nuclear cataract of both eyes  Planning upcoming cataract surgery.     Osteopenia, unspecified location  Continue vit D/calcium supplementation. Has switched to Reclast due to insurance coverage.     Wart of hand  Dicussed use of Compound W    Chronic pain of right thumb  Likely 2/2 CMC OA. Not overly bothersome at this time. CTM    F/U in 4 months.    All questions were answered and the patient agrees with the plan.     Thank you,  Hans Albarran MD

## 2024-07-30 RX ORDER — INSULIN GLARGINE 300 U/ML
64 INJECTION, SOLUTION SUBCUTANEOUS DAILY
Qty: 20 ML | Refills: 0 | OUTPATIENT
Start: 2024-07-30

## 2024-07-30 RX ORDER — INSULIN GLARGINE 300 U/ML
64 INJECTION, SOLUTION SUBCUTANEOUS DAILY
Qty: 21 ML | Refills: 3 | Status: SHIPPED | OUTPATIENT
Start: 2024-07-30

## 2024-07-30 NOTE — TELEPHONE ENCOUNTER
Refill passed per [facility] protocols.    Requested Prescriptions   Pending Prescriptions Disp Refills    Insulin Glargine, 1 Unit Dial, (TOUJEO SOLOSTAR) 300 UNIT/ML Subcutaneous Solution Pen-injector [Pharmacy Med Name: TOUJEO SOLOSTAR PEN 1.5ML 3'S 300U/ML] 21 mL 3     Sig: Inject 64 Units/day into the skin daily.       Diabetes Medication Protocol Passed - 7/26/2024  9:38 AM        Passed - Last A1C < 7.5 and within past 6 months     Lab Results   Component Value Date    A1C 6.6 (A) 06/28/2024             Passed - In person appointment or virtual visit in the past 6 mos or appointment in next 3 mos     Recent Outpatient Visits              1 month ago Type 2 diabetes mellitus with stage 3b chronic kidney disease, without long-term current use of insulin ([facility])    [address]    Office Visit    4 months ago Encounter for annual health examination    [address]    Office Visit    4 months ago Hypertrophy tonsils    [facility] [place], Joao Anderson MD    Office Visit    5 months ago Enlarged tonsils    [address]    Office Visit    6 months ago Type 2 diabetes mellitus with stage 3b chronic kidney disease, with long-term current use of insulin ([facility])    [address]    Office Visit          Future Appointments         Provider Department Appt Notes    In 3 months Hans Albarran MD [address] 4 month follow up                    Passed - Microalbumin procedure in past 12 months or taking ACE/ARB        Passed - EGFRCR or GFRNAA > 50     GFR Evaluation  EGFRCR: 54 , resulted on 1/9/2024          Passed - GFR in the past 12 months

## 2024-07-30 NOTE — TELEPHONE ENCOUNTER
Received call from pt checking on status of rx. Informed her rx was sent today at 4:03. Patient appreciative.

## 2024-08-03 RX ORDER — INSULIN GLARGINE 300 U/ML
64 INJECTION, SOLUTION SUBCUTANEOUS DAILY
Qty: 21 ML | Refills: 3 | OUTPATIENT
Start: 2024-08-03

## 2024-08-03 NOTE — TELEPHONE ENCOUNTER
Duplicate request, previously addressed.        Disp Refills Start End     Insulin Glargine, 1 Unit Dial, (TOUJEO SOLOSTAR) 300 UNIT/ML Subcutaneous Solution Pen-injector 21 mL 3 7/30/2024 --    Sig - Route: Inject 64 Units/day into the skin daily. - Subcutaneous    Sent to pharmacy as: Toujeo SoloStar 300 UNIT/ML Subcutaneous Solution Pen-injector (Insulin Glargine (1 Unit Dial))    E-Prescribing Status: Receipt confirmed by pharmacy (7/30/2024  4:03 PM CDT)      Pharmacy    [facility] - [address] [phone], [phone]

## 2024-09-16 DIAGNOSIS — E11.22 TYPE 2 DIABETES MELLITUS WITH STAGE 3B CHRONIC KIDNEY DISEASE, WITH LONG-TERM CURRENT USE OF INSULIN (HCC): ICD-10-CM

## 2024-09-16 DIAGNOSIS — N18.32 TYPE 2 DIABETES MELLITUS WITH STAGE 3B CHRONIC KIDNEY DISEASE, WITH LONG-TERM CURRENT USE OF INSULIN (HCC): ICD-10-CM

## 2024-09-16 DIAGNOSIS — Z79.4 TYPE 2 DIABETES MELLITUS WITH STAGE 3B CHRONIC KIDNEY DISEASE, WITH LONG-TERM CURRENT USE OF INSULIN (HCC): ICD-10-CM

## 2024-09-19 DIAGNOSIS — E11.22 TYPE 2 DIABETES MELLITUS WITH STAGE 3B CHRONIC KIDNEY DISEASE, WITH LONG-TERM CURRENT USE OF INSULIN (HCC): ICD-10-CM

## 2024-09-19 DIAGNOSIS — Z79.4 TYPE 2 DIABETES MELLITUS WITH STAGE 3B CHRONIC KIDNEY DISEASE, WITH LONG-TERM CURRENT USE OF INSULIN (HCC): ICD-10-CM

## 2024-09-19 DIAGNOSIS — N18.32 TYPE 2 DIABETES MELLITUS WITH STAGE 3B CHRONIC KIDNEY DISEASE, WITH LONG-TERM CURRENT USE OF INSULIN (HCC): ICD-10-CM

## 2024-09-19 RX ORDER — EMPAGLIFLOZIN 25 MG/1
1 TABLET, FILM COATED ORAL DAILY
Qty: 90 TABLET | Refills: 3 | Status: SHIPPED | OUTPATIENT
Start: 2024-09-19

## 2024-09-19 RX ORDER — EMPAGLIFLOZIN 25 MG/1
1 TABLET, FILM COATED ORAL DAILY
Qty: 90 TABLET | Refills: 3 | OUTPATIENT
Start: 2024-09-19

## 2024-09-20 NOTE — TELEPHONE ENCOUNTER
Refill passed per [facility] protocols.    Requested Prescriptions   Pending Prescriptions Disp Refills    JARDIANCE 25 MG Oral Tab [Pharmacy Med Name: JARDIANCE TABS 25MG] 90 tablet 3     Sig: TAKE 1 TABLET DAILY       Diabetes Medication Protocol Passed - 9/16/2024 10:22 AM        Passed - Last A1C < 7.5 and within past 6 months     Lab Results   Component Value Date    A1C 6.6 (A) 06/28/2024             Passed - In person appointment or virtual visit in the past 6 mos or appointment in next 3 mos     Recent Outpatient Visits              2 months ago Type 2 diabetes mellitus with stage 3b chronic kidney disease, without long-term current use of insulin ([facility])    [address]    Office Visit    5 months ago Encounter for annual health examination    [facility] [address] [place], [place] [address]Rupali Michael, MD    Office Visit    6 months ago Hypertrophy tonsils    [facility], Joao Anderson MD    Office Visit    7 months ago Enlarged tonsils    [address] Hans Carranza MD    Office Visit    8 months ago Type 2 diabetes mellitus with stage 3b chronic kidney disease, with long-term current use of insulin ([facility])    [facility] [address] [place], [place] [address]Rupali Michael, MD    Office Visit          Future Appointments         Provider Department Appt Notes    In 1 month Hans Albarran MD [address] 4 month follow up                    Passed - Microalbumin procedure in past 12 months or taking ACE/ARB        Passed - EGFRCR or GFRNAA > 50     GFR Evaluation  EGFRCR: 54 , resulted on 1/9/2024          Passed - GFR in the past 12 months

## 2024-10-29 ENCOUNTER — OFFICE VISIT (OUTPATIENT)
Dept: INTERNAL MEDICINE CLINIC | Facility: CLINIC | Age: 79
End: 2024-10-29
Payer: COMMERCIAL

## 2024-10-29 VITALS
SYSTOLIC BLOOD PRESSURE: 128 MMHG | HEART RATE: 84 BPM | DIASTOLIC BLOOD PRESSURE: 68 MMHG | WEIGHT: 293 LBS | OXYGEN SATURATION: 97 % | TEMPERATURE: 97 F | BODY MASS INDEX: 44 KG/M2

## 2024-10-29 DIAGNOSIS — Z98.41 S/P BILATERAL CATARACT EXTRACTION: ICD-10-CM

## 2024-10-29 DIAGNOSIS — N18.32 TYPE 2 DIABETES MELLITUS WITH STAGE 3B CHRONIC KIDNEY DISEASE, WITHOUT LONG-TERM CURRENT USE OF INSULIN (HCC): Primary | ICD-10-CM

## 2024-10-29 DIAGNOSIS — Z98.42 S/P BILATERAL CATARACT EXTRACTION: ICD-10-CM

## 2024-10-29 DIAGNOSIS — E11.22 TYPE 2 DIABETES MELLITUS WITH STAGE 3B CHRONIC KIDNEY DISEASE, WITHOUT LONG-TERM CURRENT USE OF INSULIN (HCC): Primary | ICD-10-CM

## 2024-10-29 DIAGNOSIS — I10 ESSENTIAL HYPERTENSION WITH GOAL BLOOD PRESSURE LESS THAN 140/90: ICD-10-CM

## 2024-10-29 LAB — HEMOGLOBIN A1C: 6.6 % (ref 4.3–5.6)

## 2024-10-29 PROCEDURE — 90662 IIV NO PRSV INCREASED AG IM: CPT | Performed by: FAMILY MEDICINE

## 2024-10-29 PROCEDURE — 3074F SYST BP LT 130 MM HG: CPT | Performed by: FAMILY MEDICINE

## 2024-10-29 PROCEDURE — 1159F MED LIST DOCD IN RCRD: CPT | Performed by: FAMILY MEDICINE

## 2024-10-29 PROCEDURE — 99214 OFFICE O/P EST MOD 30 MIN: CPT | Performed by: FAMILY MEDICINE

## 2024-10-29 PROCEDURE — G0008 ADMIN INFLUENZA VIRUS VAC: HCPCS | Performed by: FAMILY MEDICINE

## 2024-10-29 PROCEDURE — 1160F RVW MEDS BY RX/DR IN RCRD: CPT | Performed by: FAMILY MEDICINE

## 2024-10-29 PROCEDURE — 83036 HEMOGLOBIN GLYCOSYLATED A1C: CPT | Performed by: FAMILY MEDICINE

## 2024-10-29 PROCEDURE — 3078F DIAST BP <80 MM HG: CPT | Performed by: FAMILY MEDICINE

## 2024-10-29 NOTE — PROGRESS NOTES
Sasha Valdivia  2/4/1945    Chief Complaint   Patient presents with    Follow - Up     4 month f/u       HPI:   Sasha Valdivia is a 79 year old female who presents for follow-up. She did well after her cataract surgery. She has been stable on current treatment. No new concerns today. She is trying to find a new dentist as her last wanted her to use prophylactic antibiotics.     Current Outpatient Medications   Medication Sig Dispense Refill    Empagliflozin (JARDIANCE) 25 MG Oral Tab Take 1 tablet by mouth daily. 90 tablet 3    Insulin Glargine, 1 Unit Dial, (TOUJEO SOLOSTAR) 300 UNIT/ML Subcutaneous Solution Pen-injector Inject 64 Units/day into the skin daily. 21 mL 3    lisinopril 10 MG Oral Tab Take 1 tablet (10 mg total) by mouth daily. 90 tablet 3    metFORMIN HCl 1000 MG Oral Tab Take 1 tablet (1,000 mg total) by mouth 2 (two) times daily with meals. 180 tablet 3    atorvastatin (LIPITOR) 40 MG Oral Tab Take 1 tablet (40 mg total) by mouth nightly. 90 tablet 3    torsemide 5 MG Oral Tab Take 1 tablet (5 mg total) by mouth daily. 90 tablet 3    semaglutide (OZEMPIC, 1 MG/DOSE,) 4 MG/3ML Subcutaneous Solution Pen-injector Inject 1 mg into the skin once a week. 9 mL 3    Insulin Pen Needle (BD PEN NEEDLE BRYSON U/F) 32G X 4 MM Does not apply Misc Use daily with Toujeo 90 each 3    OneTouch Delica Lancets 33G Does not apply Misc TEST BLOOD SUGAR DAILY Non-Insulin Dependent Diabetes Type II. E11.9. 100 each 3    Blood Glucose Monitoring Suppl (ONETOUCH ULTRA 2) w/Device Does not apply Kit TEST BLOOD SUGAR ONCE DAILY Non-Insulin Dependent Diabetes Type II. E11.9. 1 kit 0    Glucose Blood (ONETOUCH ULTRA) In Vitro Strip TEST BLOOD SUGAR ONCE DAILY Non-Insulin Dependent Diabetes Type II. E11.9. 100 strip 3    Calcium Carbonate-Vitamin D (CALCIUM + D OR) Take 1 tablet by mouth daily.      aspirin 81 MG Oral Tab Take 1 tablet (81 mg total) by mouth daily.        Allergies[1]   Past Medical History:     Arthritis    Cancer (HCC)    breast    High blood pressure    High cholesterol    WIL (obstructive sleep apnea)    AHI 92 RDI 95 REM AHI 0 Supine  non-supine AHI 70 Sao2 Seven 79%/CPAP-10cpw w/ 5L O2    Type II or unspecified type diabetes mellitus without mention of complication, not stated as uncontrolled    Unspecified essential hypertension    Visual impairment      Patient Active Problem List   Diagnosis    Multiple thyroid nodules    Vitamin D deficiency    Primary osteoarthritis of both knees    Pes planus of both feet    Primary osteoarthritis of both feet    Chronic gout without tophus, unspecified cause, unspecified site    S/P subtotal parathyroidectomy    Hyperlipidemia, unspecified hyperlipidemia type    History of adenomatous polyp of colon    Essential hypertension with goal blood pressure less than 140/90    Morbid obesity, unspecified obesity type (HCC)    Allergic rhinitis, unspecified seasonality, unspecified trigger    Malignant neoplasm of left female breast, unspecified estrogen receptor status, unspecified site of breast (HCC)    Osteopenia, unspecified location    WIL (obstructive sleep apnea)    Status post bilateral knee replacements    Stage 3b chronic kidney disease (HCC)    Type 2 diabetes mellitus with stage 3b chronic kidney disease, without long-term current use of insulin (HCC)    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 and stage 3b chronic kidney disease (HCC)    Tortuous aorta (HCC)    Moderate pulmonary hypertension (HCC)    Age-related nuclear cataract of both eyes    S/P bilateral cataract extraction      Past Surgical History:   Procedure Laterality Date    Colonoscopy N/A 2/14/2019    SSA, adenomas- repeat 3 yrs    Colonoscopy N/A 1/12/2024    Procedure: COLONOSCOPY with forcep and cold snare polypectomy;  Surgeon: Yahir Cast DO;  Location:  ENDOSCOPY    Colonoscopy & polypectomy  8/15    polyps; tics; repeat 3 yrs  (adenoma)    Colonoscopy,biopsy  8/19/2015    Procedure: ;  Surgeon: Ismael Vazquez MD;  Location: [facility]    Colonoscopy,remv lesn,snare N/A 8/19/2015    Procedure: COLONOSCOPY, POSSIBLE BIOPSY, POSSIBLE POLYPECTOMY 01678;  Surgeon: Ismael Vazquez MD;  Location: [facility]    Parathyroidectomy      Total knee replacement Left 11/22/16    Dr Higgins    Total knee replacement Right 2/28/17    Dr Higgins      Family History   Problem Relation Age of Onset    Arthritis Mother     Diabetes Mother       Social History     Socioeconomic History    Marital status:    Tobacco Use    Smoking status: Never    Smokeless tobacco: Never   Vaping Use    Vaping status: Never Used   Substance and Sexual Activity    Alcohol use: Yes     Alcohol/week: 0.0 standard drinks of alcohol     Comment: once or twice a year    Drug use: No         REVIEW OF SYSTEMS:   GENERAL: feels well otherwise, no recent illness.     EXAM:   /68 (BP Location: Left arm, Patient Position: Sitting, Cuff Size: large)   Pulse 84   Temp 97 °F (36.1 °C) (Temporal)   Wt 297 lb (134.7 kg)   SpO2 97%   BMI 43.86 kg/m²   GENERAL: Well developed, well nourished,in no apparent distress  SKIN: No rash  EYES: PERRLA, EOMI, conjunctiva are clear  LUNGS: normal work of breathing  CARDIO: Regular rate    ASSESSMENT AND PLAN:   Sasha Valdivia is a 79 year old female who presents for follow-up    Type 2 diabetes mellitus with stage 3b chronic kidney disease, without long-term current use of insulin ([facility])  A1c overall stable and controlled. Continue current treatment. Eye exam UTD, no retinopathy. Follow-up in March for CPE with labs prior.   - POC Hemoglobin A1C    S/P bilateral cataract extraction  Overall doing well, will be following with optho yearly.     Essential hypertension with goal blood pressure less than 140/90  BP at goal. Continue current treatment. Follow-up in 6 months.     All questions were  answered and the patient agrees with the plan.     Thank you,  Hans Albarran MD         [1]   Allergies  Allergen Reactions    Pcn [Penicillins] UNKNOWN     Childhood reaction

## 2024-11-08 DIAGNOSIS — Z00.00 ENCOUNTER FOR ANNUAL HEALTH EXAMINATION: ICD-10-CM

## 2024-11-08 DIAGNOSIS — E78.5 HYPERLIPIDEMIA, UNSPECIFIED HYPERLIPIDEMIA TYPE: ICD-10-CM

## 2024-11-08 DIAGNOSIS — N18.32 STAGE 3B CHRONIC KIDNEY DISEASE (HCC): ICD-10-CM

## 2024-11-08 DIAGNOSIS — E11.22 TYPE 2 DIABETES MELLITUS WITH STAGE 3B CHRONIC KIDNEY DISEASE, WITHOUT LONG-TERM CURRENT USE OF INSULIN (HCC): Primary | ICD-10-CM

## 2024-11-08 DIAGNOSIS — N18.32 TYPE 2 DIABETES MELLITUS WITH STAGE 3B CHRONIC KIDNEY DISEASE, WITHOUT LONG-TERM CURRENT USE OF INSULIN (HCC): Primary | ICD-10-CM

## 2025-02-28 DIAGNOSIS — N18.32 TYPE 2 DIABETES MELLITUS WITH STAGE 3B CHRONIC KIDNEY DISEASE, WITHOUT LONG-TERM CURRENT USE OF INSULIN (HCC): ICD-10-CM

## 2025-02-28 DIAGNOSIS — E66.01 MORBID OBESITY, UNSPECIFIED OBESITY TYPE (HCC): ICD-10-CM

## 2025-02-28 DIAGNOSIS — E11.22 TYPE 2 DIABETES MELLITUS WITH STAGE 3B CHRONIC KIDNEY DISEASE, WITHOUT LONG-TERM CURRENT USE OF INSULIN (HCC): ICD-10-CM

## 2025-03-04 RX ORDER — SEMAGLUTIDE 1.34 MG/ML
1 INJECTION, SOLUTION SUBCUTANEOUS WEEKLY
Qty: 9 ML | Refills: 3 | Status: SHIPPED | OUTPATIENT
Start: 2025-03-04

## 2025-03-04 NOTE — TELEPHONE ENCOUNTER
Please review; protocol failed/No Protocol      Sent MyChart message to patient to complete labs that are due on 03/07/2025

## 2025-03-27 PROBLEM — Z85.3 HISTORY OF BREAST CANCER: Status: ACTIVE | Noted: 2025-03-27

## 2025-03-27 PROBLEM — C50.912 MALIGNANT NEOPLASM OF LEFT FEMALE BREAST, UNSPECIFIED ESTROGEN RECEPTOR STATUS, UNSPECIFIED SITE OF BREAST (HCC): Status: RESOLVED | Noted: 2017-06-02 | Resolved: 2025-03-27

## 2025-03-31 ENCOUNTER — LAB ENCOUNTER (OUTPATIENT)
Dept: LAB | Age: 80
End: 2025-03-31
Attending: FAMILY MEDICINE
Payer: MEDICARE

## 2025-03-31 DIAGNOSIS — N18.32 STAGE 3B CHRONIC KIDNEY DISEASE (HCC): ICD-10-CM

## 2025-03-31 DIAGNOSIS — E11.22 TYPE 2 DIABETES MELLITUS WITH STAGE 3B CHRONIC KIDNEY DISEASE, WITHOUT LONG-TERM CURRENT USE OF INSULIN (HCC): ICD-10-CM

## 2025-03-31 DIAGNOSIS — E78.5 HYPERLIPIDEMIA, UNSPECIFIED HYPERLIPIDEMIA TYPE: ICD-10-CM

## 2025-03-31 DIAGNOSIS — N18.32 TYPE 2 DIABETES MELLITUS WITH STAGE 3B CHRONIC KIDNEY DISEASE, WITHOUT LONG-TERM CURRENT USE OF INSULIN (HCC): ICD-10-CM

## 2025-03-31 DIAGNOSIS — Z00.00 ENCOUNTER FOR ANNUAL HEALTH EXAMINATION: ICD-10-CM

## 2025-03-31 LAB
ALBUMIN SERPL-MCNC: 4.7 G/DL (ref 3.2–4.8)
ALBUMIN/GLOB SERPL: 1.7 {RATIO} (ref 1–2)
ALP LIVER SERPL-CCNC: 73 U/L
ALT SERPL-CCNC: 23 U/L
ANION GAP SERPL CALC-SCNC: 10 MMOL/L (ref 0–18)
AST SERPL-CCNC: 21 U/L (ref ?–34)
BILIRUB SERPL-MCNC: 0.7 MG/DL (ref 0.2–1.1)
BUN BLD-MCNC: 20 MG/DL (ref 9–23)
CALCIUM BLD-MCNC: 9.8 MG/DL (ref 8.7–10.6)
CHLORIDE SERPL-SCNC: 106 MMOL/L (ref 98–112)
CHOLEST SERPL-MCNC: 160 MG/DL (ref ?–200)
CO2 SERPL-SCNC: 31 MMOL/L (ref 21–32)
CREAT BLD-MCNC: 1.15 MG/DL
EGFRCR SERPLBLD CKD-EPI 2021: 48 ML/MIN/1.73M2 (ref 60–?)
EST. AVERAGE GLUCOSE BLD GHB EST-MCNC: 160 MG/DL (ref 68–126)
FASTING PATIENT LIPID ANSWER: YES
FASTING STATUS PATIENT QL REPORTED: YES
GLOBULIN PLAS-MCNC: 2.7 G/DL (ref 2–3.5)
GLUCOSE BLD-MCNC: 119 MG/DL (ref 70–99)
HBA1C MFR BLD: 7.2 % (ref ?–5.7)
HDLC SERPL-MCNC: 39 MG/DL (ref 40–59)
LDLC SERPL CALC-MCNC: 91 MG/DL (ref ?–100)
NONHDLC SERPL-MCNC: 121 MG/DL (ref ?–130)
OSMOLALITY SERPL CALC.SUM OF ELEC: 308 MOSM/KG (ref 275–295)
POTASSIUM SERPL-SCNC: 4.7 MMOL/L (ref 3.5–5.1)
PROT SERPL-MCNC: 7.4 G/DL (ref 5.7–8.2)
SODIUM SERPL-SCNC: 147 MMOL/L (ref 136–145)
TRIGL SERPL-MCNC: 173 MG/DL (ref 30–149)
VLDLC SERPL CALC-MCNC: 28 MG/DL (ref 0–30)

## 2025-03-31 PROCEDURE — 80053 COMPREHEN METABOLIC PANEL: CPT

## 2025-03-31 PROCEDURE — 83036 HEMOGLOBIN GLYCOSYLATED A1C: CPT

## 2025-03-31 PROCEDURE — 80061 LIPID PANEL: CPT

## 2025-03-31 PROCEDURE — 36415 COLL VENOUS BLD VENIPUNCTURE: CPT

## 2025-04-04 ENCOUNTER — HOSPITAL ENCOUNTER (OUTPATIENT)
Dept: GENERAL RADIOLOGY | Age: 80
Discharge: HOME | End: 2025-04-04
Attending: FAMILY MEDICINE
Payer: MEDICARE

## 2025-04-04 ENCOUNTER — LAB ENCOUNTER (OUTPATIENT)
Dept: LAB | Age: 80
End: 2025-04-04
Attending: FAMILY MEDICINE
Payer: MEDICARE

## 2025-04-04 ENCOUNTER — OFFICE VISIT (OUTPATIENT)
Dept: INTERNAL MEDICINE CLINIC | Facility: CLINIC | Age: 80
End: 2025-04-04
Payer: COMMERCIAL

## 2025-04-04 VITALS
HEART RATE: 92 BPM | TEMPERATURE: 97 F | DIASTOLIC BLOOD PRESSURE: 70 MMHG | RESPIRATION RATE: 19 BRPM | HEIGHT: 67.52 IN | OXYGEN SATURATION: 97 % | BODY MASS INDEX: 44.92 KG/M2 | SYSTOLIC BLOOD PRESSURE: 130 MMHG | WEIGHT: 293 LBS

## 2025-04-04 DIAGNOSIS — J30.9 ALLERGIC RHINITIS, UNSPECIFIED SEASONALITY, UNSPECIFIED TRIGGER: ICD-10-CM

## 2025-04-04 DIAGNOSIS — G89.29 CHRONIC RIGHT HIP PAIN: ICD-10-CM

## 2025-04-04 DIAGNOSIS — M79.644 THUMB PAIN, RIGHT: ICD-10-CM

## 2025-04-04 DIAGNOSIS — G47.33 OSA (OBSTRUCTIVE SLEEP APNEA): ICD-10-CM

## 2025-04-04 DIAGNOSIS — E78.5 HYPERLIPIDEMIA, UNSPECIFIED HYPERLIPIDEMIA TYPE: ICD-10-CM

## 2025-04-04 DIAGNOSIS — G89.29 CHRONIC PAIN OF RIGHT KNEE: ICD-10-CM

## 2025-04-04 DIAGNOSIS — Z98.42 S/P BILATERAL CATARACT EXTRACTION: ICD-10-CM

## 2025-04-04 DIAGNOSIS — N18.32 TYPE 2 DIABETES MELLITUS WITH STAGE 3B CHRONIC KIDNEY DISEASE, WITHOUT LONG-TERM CURRENT USE OF INSULIN (HCC): ICD-10-CM

## 2025-04-04 DIAGNOSIS — I50.32 HYPERTENSIVE HEART AND KIDNEY DISEASE WITH CHRONIC DIASTOLIC CONGESTIVE HEART FAILURE AND STAGE 3B CHRONIC KIDNEY DISEASE (HCC): ICD-10-CM

## 2025-04-04 DIAGNOSIS — Z98.890 S/P SUBTOTAL PARATHYROIDECTOMY: ICD-10-CM

## 2025-04-04 DIAGNOSIS — M25.551 CHRONIC RIGHT HIP PAIN: ICD-10-CM

## 2025-04-04 DIAGNOSIS — E11.22 TYPE 2 DIABETES MELLITUS WITH STAGE 3B CHRONIC KIDNEY DISEASE, WITHOUT LONG-TERM CURRENT USE OF INSULIN (HCC): ICD-10-CM

## 2025-04-04 DIAGNOSIS — Z90.89 S/P SUBTOTAL PARATHYROIDECTOMY: ICD-10-CM

## 2025-04-04 DIAGNOSIS — E66.01 MORBID OBESITY, UNSPECIFIED OBESITY TYPE (HCC): ICD-10-CM

## 2025-04-04 DIAGNOSIS — I10 ESSENTIAL HYPERTENSION WITH GOAL BLOOD PRESSURE LESS THAN 140/90: ICD-10-CM

## 2025-04-04 DIAGNOSIS — M85.80 OSTEOPENIA, UNSPECIFIED LOCATION: ICD-10-CM

## 2025-04-04 DIAGNOSIS — M1A.9XX0 CHRONIC GOUT WITHOUT TOPHUS, UNSPECIFIED CAUSE, UNSPECIFIED SITE: ICD-10-CM

## 2025-04-04 DIAGNOSIS — Z98.41 S/P BILATERAL CATARACT EXTRACTION: ICD-10-CM

## 2025-04-04 DIAGNOSIS — Z00.00 ENCOUNTER FOR ANNUAL HEALTH EXAMINATION: ICD-10-CM

## 2025-04-04 DIAGNOSIS — M25.561 CHRONIC PAIN OF RIGHT KNEE: ICD-10-CM

## 2025-04-04 DIAGNOSIS — N18.32 HYPERTENSIVE HEART AND KIDNEY DISEASE WITH CHRONIC DIASTOLIC CONGESTIVE HEART FAILURE AND STAGE 3B CHRONIC KIDNEY DISEASE (HCC): ICD-10-CM

## 2025-04-04 DIAGNOSIS — M21.42 PES PLANUS OF BOTH FEET: ICD-10-CM

## 2025-04-04 DIAGNOSIS — Z96.653 STATUS POST BILATERAL KNEE REPLACEMENTS: ICD-10-CM

## 2025-04-04 DIAGNOSIS — E04.2 MULTIPLE THYROID NODULES: ICD-10-CM

## 2025-04-04 DIAGNOSIS — I27.20 MODERATE PULMONARY HYPERTENSION (HCC): ICD-10-CM

## 2025-04-04 DIAGNOSIS — M19.072 PRIMARY OSTEOARTHRITIS OF BOTH FEET: ICD-10-CM

## 2025-04-04 DIAGNOSIS — I13.0 HYPERTENSIVE HEART AND KIDNEY DISEASE WITH CHRONIC DIASTOLIC CONGESTIVE HEART FAILURE AND STAGE 3B CHRONIC KIDNEY DISEASE (HCC): ICD-10-CM

## 2025-04-04 DIAGNOSIS — M21.41 PES PLANUS OF BOTH FEET: ICD-10-CM

## 2025-04-04 DIAGNOSIS — I77.1 TORTUOUS AORTA: ICD-10-CM

## 2025-04-04 DIAGNOSIS — E55.9 VITAMIN D DEFICIENCY: ICD-10-CM

## 2025-04-04 DIAGNOSIS — Z86.0101 HISTORY OF ADENOMATOUS POLYP OF COLON: ICD-10-CM

## 2025-04-04 DIAGNOSIS — N18.32 STAGE 3B CHRONIC KIDNEY DISEASE (HCC): ICD-10-CM

## 2025-04-04 DIAGNOSIS — M19.071 PRIMARY OSTEOARTHRITIS OF BOTH FEET: ICD-10-CM

## 2025-04-04 DIAGNOSIS — Z00.00 ENCOUNTER FOR ANNUAL HEALTH EXAMINATION: Primary | ICD-10-CM

## 2025-04-04 DIAGNOSIS — Z85.3 HISTORY OF BREAST CANCER: ICD-10-CM

## 2025-04-04 LAB
CREAT UR-SCNC: 74.2 MG/DL
MICROALBUMIN UR-MCNC: 3.1 MG/DL
MICROALBUMIN/CREAT 24H UR-RTO: 41.8 UG/MG (ref ?–30)

## 2025-04-04 PROCEDURE — 73560 X-RAY EXAM OF KNEE 1 OR 2: CPT | Performed by: FAMILY MEDICINE

## 2025-04-04 PROCEDURE — 73140 X-RAY EXAM OF FINGER(S): CPT | Performed by: FAMILY MEDICINE

## 2025-04-04 PROCEDURE — 73502 X-RAY EXAM HIP UNI 2-3 VIEWS: CPT | Performed by: FAMILY MEDICINE

## 2025-04-04 PROCEDURE — 82043 UR ALBUMIN QUANTITATIVE: CPT

## 2025-04-04 PROCEDURE — 82570 ASSAY OF URINE CREATININE: CPT

## 2025-04-04 PROCEDURE — 73562 X-RAY EXAM OF KNEE 3: CPT | Performed by: FAMILY MEDICINE

## 2025-04-04 RX ORDER — ZOLEDRONIC ACID 0.05 MG/ML
5 INJECTION, SOLUTION INTRAVENOUS ONCE
COMMUNITY

## 2025-04-04 NOTE — PROGRESS NOTES
Subjective:   Sasha Valdivia is a 80 year old female who presents for a MA AHA (Medicare Advantage Annual Health Assessment) and scheduled follow up of multiple significant but stable problems.  Overall she has been in stable health however she has been having more right knee pain and some intermittent right hip discomfort.  No obvious trauma or fall prior to the onset of pain.  She is status post bilateral knee replacements.  She has also been having some right base of the thumb pain.  These orthopedic ailments have made her less active and she has been eating out a little more often than normal with her diet.  She is consistent with her specialist follow-ups.     History/Other:   Fall Risk Assessment:   She has been screened for Falls and is low risk.      Cognitive Assessment:   She had a completely normal cognitive assessment - see flowsheet entries     Functional Ability/Status:   Sasha Valdivia has some abnormal functions as listed below:  She has Hearing problems based on screening of functional status.      Depression Screening (PHQ):  PHQ-2 SCORE: 0  , done 3/28/2025        <5 minutes spent screening and counseling for depression    Advanced Directives:   She does NOT have a Living Will. [Do you have a living will?: No]  She does NOT have a Power of  for Health Care. [Do you have a healthcare power of ?: No]  Not discussed      Patient Active Problem List   Diagnosis    Multiple thyroid nodules    Vitamin D deficiency    Primary osteoarthritis of both knees    Pes planus of both feet    Primary osteoarthritis of both feet    Chronic gout without tophus, unspecified cause, unspecified site    S/P subtotal parathyroidectomy    Hyperlipidemia, unspecified hyperlipidemia type    History of adenomatous polyp of colon    Essential hypertension with goal blood pressure less than 140/90    Morbid obesity, unspecified obesity type (HCC)    Allergic rhinitis, unspecified  seasonality, unspecified trigger    Osteopenia, unspecified location    WIL (obstructive sleep apnea)    Status post bilateral knee replacements    Stage 3b chronic kidney disease (HCC)    Type 2 diabetes mellitus with stage 3b chronic kidney disease, without long-term current use of insulin (HCC)    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 and stage 3b chronic kidney disease (HCC)    Tortuous aorta    Moderate pulmonary hypertension (HCC)    Age-related nuclear cataract of both eyes    S/P bilateral cataract extraction    History of breast cancer     Allergies:  She is allergic to pcn [penicillins].    Current Medications:  Outpatient Medications Marked as Taking for the 4/4/25 encounter (Office Visit) with Hans Albarran MD   Medication Sig    zoledronic acid 5 mg/100mL Intravenous Solution Inject 100 mL (5 mg total) into the vein once.    semaglutide (OZEMPIC, 1 MG/DOSE,) 4 MG/3ML Subcutaneous Solution Pen-injector Inject 1 mg into the skin once a week.    Empagliflozin (JARDIANCE) 25 MG Oral Tab Take 1 tablet by mouth daily.    Insulin Glargine, 1 Unit Dial, (TOUJEO SOLOSTAR) 300 UNIT/ML Subcutaneous Solution Pen-injector Inject 64 Units/day into the skin daily.    lisinopril 10 MG Oral Tab Take 1 tablet (10 mg total) by mouth daily.    metFORMIN HCl 1000 MG Oral Tab Take 1 tablet (1,000 mg total) by mouth 2 (two) times daily with meals.    atorvastatin (LIPITOR) 40 MG Oral Tab Take 1 tablet (40 mg total) by mouth nightly.    torsemide 5 MG Oral Tab Take 1 tablet (5 mg total) by mouth daily.    Insulin Pen Needle (BD PEN NEEDLE BRYSON U/F) 32G X 4 MM Does not apply Misc Use daily with Toujeo    OneTouch Delica Lancets 33G Does not apply Misc TEST BLOOD SUGAR DAILY Non-Insulin Dependent Diabetes Type II. E11.9.    Blood Glucose Monitoring Suppl (ONETOUCH ULTRA 2) w/Device Does not apply Kit TEST BLOOD SUGAR ONCE DAILY Non-Insulin Dependent Diabetes Type II. E11.9.     Glucose Blood (ONETOUCH ULTRA) In Vitro Strip TEST BLOOD SUGAR ONCE DAILY Non-Insulin Dependent Diabetes Type II. E11.9.    Calcium Carbonate-Vitamin D (CALCIUM + D OR) Take 1 tablet by mouth daily.    aspirin 81 MG Oral Tab Take 1 tablet (81 mg total) by mouth daily.       Medical History:  She  has a past medical history of Arthritis, Cancer (HCC), High blood pressure, High cholesterol, WIL (obstructive sleep apnea) (2/6/17-Split), Type II or unspecified type diabetes mellitus without mention of complication, not stated as uncontrolled, Unspecified essential hypertension, and Visual impairment.  Surgical History:  She  has a past surgical history that includes colonoscopy & polypectomy (8/15); colonoscopy,remv lesn,snare (N/A, 8/19/2015); colonoscopy,biopsy (8/19/2015); parathyroidectomy; total knee replacement (Left, 11/22/16); total knee replacement (Right, 2/28/17); colonoscopy (N/A, 2/14/2019); and colonoscopy (N/A, 1/12/2024).   Family History:  Her family history includes Arthritis in her mother; Diabetes in her mother.  Social History:  She  reports that she has never smoked. She has never used smokeless tobacco. She reports current alcohol use. She reports that she does not use drugs.    Tobacco:  She has never smoked tobacco.    CAGE Alcohol Screen:   CAGE screening score of 0 on 3/28/2025, showing low risk of alcohol abuse.      Patient Care Team:  Hans Albarran MD as PCP - General (Family Medicine)  Tyler Khan (SURGERY, GENERAL)  Maria Esther Jenkins MD (ONCOLOGY)  Marilynn Hale Md (SURGERY, VASCULAR)  Asya Prescott (Radiology)  Tyler He MD (OPHTHALMOLOGY)  Ismael Vazquez MD (GASTROENTEROLOGY)    Review of Systems     Negative except as in HPI    Objective:   Physical Exam    /70 (BP Location: Left arm, Patient Position: Sitting, Cuff Size: adult)   Pulse 92   Temp 97.2 °F (36.2 °C) (Temporal)   Resp 19   Ht 5' 7.52\" (1.715 m)   Wt 300 lb (136.1 kg)   SpO2 97%   BMI  46.27 kg/m²  Estimated body mass index is 46.27 kg/m² as calculated from the following:    Height as of this encounter: 5' 7.52\" (1.715 m).    Weight as of this encounter: 300 lb (136.1 kg).  GEN: Well-appearing  HEENT: SUGAR  CV: RRR  CHEST: CTAB  MSK: No significant hip tenderness.  Overall preserved range of motion of her right hip and no significant pain with FADIR or resisted hip flexion.  She has mild tenderness about the anterior medial aspect of the knee with generally preserved and symmetric range of motion.  She does have tenderness about the base of the right thumb.  Negative Finkelstein.    Medicare Hearing Assessment:   Hearing Screening    Time taken: 4/4/2025  2:35 PM  Entry User: Sarika Charles CMA  Screening Method: Finger Rub         Visual Acuity:   Right Eye Visual Acuity: Corrected Right Eye Chart Acuity: 20/25   Left Eye Visual Acuity: Corrected Left Eye Chart Acuity: 20/20   Both Eyes Visual Acuity: Corrected Both Eyes Chart Acuity: 20/20   Able To Tolerate Visual Acuity: Yes        Assessment & Plan:   Sasha Valdivia is a 80 year old female who presents for a Medicare Assessment.     Encounter for annual health examination    Chronic pain of right knee  Status post bilateral knee replacements  No significant trauma prior to the onset of her knee pain.  Will check radiographs today to assess for integrity of her arthroplasty.  - XR Knee (replaced) right routine (3 views) - [facility] Orth[facility] Consult Only; Future    Chronic right hip pain  Her hip exam is overall benign today with preserved range of motion and no tenderness.  Suspect symptoms related to underlying arthritis, will begin evaluation with radiographs.  - XR HIP W OR WO PELVIS 2 OR 3 VIEWS, RIGHT (CPT=73502); Future    Thumb pain, right  Suspect CMC arthritis.  Will begin evaluation with radiographs today.  - XR FINGER(S) (MIN 2 VIEWS), RIGHT THUMB (CPT=73140); Future    Multiple thyroid nodules  S/P subtotal  parathyroidectomy  Thyroid nodules monitored regularly with ultrasound by ENT.    Type 2 diabetes mellitus with stage 3b chronic kidney disease, without long-term current use of insulin (HCC)  Stage 3b chronic kidney disease (HCC)  Morbid obesity, unspecified obesity type (HCC)  A1c up a bit from prior likely related to her decreased exercise/activity.  We will continue current treatment for now and work on dietary optimization.  Renal function overall stable.  Plan repeat labs prior to follow-up in 6 months    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 and stage 3b chronic kidney disease (HCC)  Tortuous aorta  Overall compensated cardiac status.  Continue risk factor optimization and good blood pressure control.    Essential hypertension with goal blood pressure less than 140/90  BP at goal on current treatment    Moderate pulmonary hypertension (HCC)  WIL (obstructive sleep apnea)  Stable on CPAP, following with pulmonology.    History of breast cancer  Following regularly with her oncologist, she states her mammogram was recently done    Hyperlipidemia, unspecified hyperlipidemia type  Continue statin, LDL < 100    Vitamin D deficiency  Osteopenia, unspecified location  Continue vitamin D and calcium supplementation.    History of adenomatous polyp of colon  Colonoscopy up-to-date.    Primary osteoarthritis of both feet  Pes planus of both feet  Stable, continue to monitor    Chronic gout without tophus, unspecified cause, unspecified site  No recent gout issues.    Allergic rhinitis, unspecified seasonality  Overall stable.    S/P bilateral cataract extraction  Stable following with ophthalmology.    The patient indicates understanding of these issues and agrees to the plan.  Imaging studies ordered.  Reinforced healthy diet, lifestyle, and exercise.      Return in 6 months (on 10/4/2025).     Hans Albarran MD, 4/4/2025     Supplementary Documentation:   General  Health:  In the past six months, have you lost more than 10 pounds without trying?: 2 - No  Has your appetite been poor?: No  Type of Diet: Balanced, Diabetic  How does the patient maintain a good energy level?: Stretching  How would you describe your daily physical activity?: Light  How would you describe your current health state?: Fair  How do you maintain positive mental well-being?: Social Interaction, Visiting Family  On a scale of 0 to 10, with 0 being no pain and 10 being severe pain, what is your pain level?: 6 - (Moderate) (Pain in R leg)  In the past six months, have you experienced urine leakage?: 0-No  At any time do you feel concerned for the safety/well-being of yourself and/or your children, in your home or elsewhere?: No  Have you had any immunizations at another office such as Influenza, Hepatitis B, Tetanus, or Pneumococcal?: No    Health Maintenance   Topic Date Due    COVID-19 Vaccine (7 - 2024-25 season) 09/01/2024    Mammogram  10/31/2024    Annual Well Visit  01/01/2025    Diabetes Care: Foot Exam (Annual)  01/01/2025    Diabetes Care Dilated Eye Exam  07/08/2025    Diabetes Care A1C  09/30/2025    Diabetes Care: GFR  03/31/2026    Influenza Vaccine  Completed    DEXA Scan  Completed    Annual Depression Screening  Completed    Fall Risk Screening (Annual)  Completed    Diabetes Care: Microalb/Creat Ratio (Annual)  Completed    Pneumococcal Vaccine: 50+ Years  Completed    Zoster Vaccines  Completed    Meningococcal B Vaccine  Aged Out    Colorectal Cancer Screening  Discontinued

## 2025-04-07 DIAGNOSIS — G89.29 CHRONIC RIGHT HIP PAIN: Primary | ICD-10-CM

## 2025-04-07 DIAGNOSIS — M25.551 CHRONIC RIGHT HIP PAIN: Primary | ICD-10-CM

## 2025-04-10 ENCOUNTER — HOSPITAL ENCOUNTER (OUTPATIENT)
Dept: CT IMAGING | Age: 80
Discharge: HOME | End: 2025-04-10
Attending: FAMILY MEDICINE
Payer: MEDICARE

## 2025-04-10 DIAGNOSIS — G89.29 CHRONIC RIGHT HIP PAIN: ICD-10-CM

## 2025-04-10 DIAGNOSIS — M25.551 CHRONIC RIGHT HIP PAIN: ICD-10-CM

## 2025-04-10 PROCEDURE — 73700 CT LOWER EXTREMITY W/O DYE: CPT | Performed by: FAMILY MEDICINE

## 2025-04-11 DIAGNOSIS — G89.29 CHRONIC RIGHT HIP PAIN: Primary | ICD-10-CM

## 2025-04-11 DIAGNOSIS — M25.551 CHRONIC RIGHT HIP PAIN: Primary | ICD-10-CM

## 2025-04-11 DIAGNOSIS — M16.11 PRIMARY OSTEOARTHRITIS OF RIGHT HIP: ICD-10-CM

## 2025-04-14 ENCOUNTER — TELEPHONE (OUTPATIENT)
Facility: CLINIC | Age: 80
End: 2025-04-14

## 2025-04-14 NOTE — TELEPHONE ENCOUNTER
New pt appt for rt hip pain possible injection, imaging avail in epic   Future Appointments   Date Time Provider Department Center   4/17/2025 11:00 AM Hans Albarran MD [facility]   5/21/2025  9:20 AM Lui Parra MD [facility]   10/7/2025  9:00 AM Hans Albarran MD [facility]

## 2025-04-14 NOTE — TELEPHONE ENCOUNTER
4/4/25 Right Hip XR 4/4/25  CONCLUSION:    There is moderate right-sided osteoarthritis with moderate femoral acetabular impingement.   There is a linear lucency traversing the AC cervical component of the right femoral neck either representing a collar/ring of osteophytes or a hairline fracture.  If pain persists CT of the right hip would be recommended.   Mild degenerative change of the left hip joint.  Marked degenerative changes lower lumbar spine.  Pelvic surgical clips noted.

## 2025-04-17 ENCOUNTER — OFFICE VISIT (OUTPATIENT)
Dept: INTERNAL MEDICINE CLINIC | Facility: CLINIC | Age: 80
End: 2025-04-17
Payer: COMMERCIAL

## 2025-04-17 VITALS
OXYGEN SATURATION: 96 % | BODY MASS INDEX: 47 KG/M2 | TEMPERATURE: 97 F | DIASTOLIC BLOOD PRESSURE: 74 MMHG | SYSTOLIC BLOOD PRESSURE: 130 MMHG | HEART RATE: 89 BPM | WEIGHT: 293 LBS

## 2025-04-17 DIAGNOSIS — M79.644 THUMB PAIN, RIGHT: Primary | ICD-10-CM

## 2025-04-17 DIAGNOSIS — M18.11 ARTHRITIS OF CARPOMETACARPAL (CMC) JOINT OF RIGHT THUMB: ICD-10-CM

## 2025-04-17 PROCEDURE — 1160F RVW MEDS BY RX/DR IN RCRD: CPT | Performed by: FAMILY MEDICINE

## 2025-04-17 PROCEDURE — 3078F DIAST BP <80 MM HG: CPT | Performed by: FAMILY MEDICINE

## 2025-04-17 PROCEDURE — 99213 OFFICE O/P EST LOW 20 MIN: CPT | Performed by: FAMILY MEDICINE

## 2025-04-17 PROCEDURE — 3075F SYST BP GE 130 - 139MM HG: CPT | Performed by: FAMILY MEDICINE

## 2025-04-17 PROCEDURE — 1159F MED LIST DOCD IN RCRD: CPT | Performed by: FAMILY MEDICINE

## 2025-04-17 PROCEDURE — 20600 DRAIN/INJ JOINT/BURSA W/O US: CPT | Performed by: FAMILY MEDICINE

## 2025-04-17 RX ORDER — TRIAMCINOLONE ACETONIDE 40 MG/ML
40 INJECTION, SUSPENSION INTRA-ARTICULAR; INTRAMUSCULAR ONCE
Status: COMPLETED | OUTPATIENT
Start: 2025-04-17 | End: 2025-04-17

## 2025-04-17 RX ADMIN — TRIAMCINOLONE ACETONIDE 40 MG: 40 INJECTION, SUSPENSION INTRA-ARTICULAR; INTRAMUSCULAR at 11:41:00

## 2025-04-17 NOTE — PROGRESS NOTES
Sasha Valdivia  2/4/1945    Chief Complaint   Patient presents with    Injection     R thumb injection        HPI:   Sasha Valdivia is a 80 year old female who presents for follow-up on her R thumb pain. Completed XR's that show CMC arthritis. Pain is persistent and use dependant.    Current Medications[1]   Allergies[2]   Past Medical History[3]   Problem List[4]   Past Surgical History[5]   Family History[6]   Short Social Hx on File[7]      REVIEW OF SYSTEMS:   GENERAL: feels well otherwise, no recent illness.     EXAM:   /74 (BP Location: Right arm, Patient Position: Sitting, Cuff Size: large)   Pulse 89   Temp 97 °F (36.1 °C) (Temporal)   Wt (!) 302 lb (137 kg)   SpO2 96%   BMI 46.57 kg/m²   GENERAL: Well developed, well nourished,in no apparent distress  MSK: TTP over the R thumb CMC    Procedure:  After informed consent, the patient's right thumb was marked, locally anesthetized with skin refrigerant, prepped with topical antiseptic, and CMC injection was performed with combination of 1cc of 40 mg/mL of Kenalog and 1 cc of 1%lidocaine. A band-aid was applied.  The patient tolerated the procedure well. Post-injection care and warning signs were given.     ASSESSMENT AND PLAN:   Sasha Valdivia is a 80 year old female who presents for follow-up    Thumb pain, right  Arthritis of carpometacarpal (CMC) joint of right thumb  We reviewed her radiographs today in office shows advanced CMC OA. Discussed treatment options and after risks and benefits discussed, patient opted for trial of CMC corticosteroid injection which she tolerated well.   - Small joint - right aspiration/injection  - triamcinolone acetonide (Kenalog-40) 40 MG/ML injection 40 mg      All questions were answered and the patient agrees with the plan.     Thank you,  Hans Albarran MD         [1]   Current Outpatient Medications   Medication Sig Dispense Refill    zoledronic acid 5 mg/100mL Intravenous Solution  Inject 100 mL (5 mg total) into the vein once.      semaglutide (OZEMPIC, 1 MG/DOSE,) 4 MG/3ML Subcutaneous Solution Pen-injector Inject 1 mg into the skin once a week. 9 mL 3    Empagliflozin (JARDIANCE) 25 MG Oral Tab Take 1 tablet by mouth daily. 90 tablet 3    Insulin Glargine, 1 Unit Dial, (TOUJEO SOLOSTAR) 300 UNIT/ML Subcutaneous Solution Pen-injector Inject 64 Units/day into the skin daily. 21 mL 3    lisinopril 10 MG Oral Tab Take 1 tablet (10 mg total) by mouth daily. 90 tablet 3    metFORMIN HCl 1000 MG Oral Tab Take 1 tablet (1,000 mg total) by mouth 2 (two) times daily with meals. 180 tablet 3    atorvastatin (LIPITOR) 40 MG Oral Tab Take 1 tablet (40 mg total) by mouth nightly. 90 tablet 3    torsemide 5 MG Oral Tab Take 1 tablet (5 mg total) by mouth daily. 90 tablet 3    Insulin Pen Needle (BD PEN NEEDLE BRYSON U/F) 32G X 4 MM Does not apply Misc Use daily with Toujeo 90 each 3    OneTouch Delica Lancets 33G Does not apply Misc TEST BLOOD SUGAR DAILY Non-Insulin Dependent Diabetes Type II. E11.9. 100 each 3    Blood Glucose Monitoring Suppl (ONETOUCH ULTRA 2) w/Device Does not apply Kit TEST BLOOD SUGAR ONCE DAILY Non-Insulin Dependent Diabetes Type II. E11.9. 1 kit 0    Glucose Blood (ONETOUCH ULTRA) In Vitro Strip TEST BLOOD SUGAR ONCE DAILY Non-Insulin Dependent Diabetes Type II. E11.9. 100 strip 3    Calcium Carbonate-Vitamin D (CALCIUM + D OR) Take 1 tablet by mouth daily.      aspirin 81 MG Oral Tab Take 1 tablet (81 mg total) by mouth daily.     [2]   Allergies  Allergen Reactions    Pcn [Penicillins] UNKNOWN     Childhood reaction   [3]   Past Medical History:   Arthritis    Cancer (HCC)    breast    High blood pressure    High cholesterol    WIL (obstructive sleep apnea)    AHI 92 RDI 95 REM AHI 0 Supine  non-supine AHI 70 Sao2 Seven 79%/CPAP-10cpw w/ 5L O2    Type II or unspecified type diabetes mellitus without mention of complication, not stated as uncontrolled    Unspecified  essential hypertension    Visual impairment   [4]   Patient Active Problem List  Diagnosis    Multiple thyroid nodules    Vitamin D deficiency    Primary osteoarthritis of both knees    Pes planus of both feet    Primary osteoarthritis of both feet    Chronic gout without tophus, unspecified cause, unspecified site    S/P subtotal parathyroidectomy    Hyperlipidemia, unspecified hyperlipidemia type    History of adenomatous polyp of colon    Essential hypertension with goal blood pressure less than 140/90    Morbid obesity, unspecified obesity type (HCC)    Allergic rhinitis, unspecified seasonality, unspecified trigger    Osteopenia, unspecified location    WIL (obstructive sleep apnea)    Status post bilateral knee replacements    Stage 3b chronic kidney disease (HCC)    Type 2 diabetes mellitus with stage 3b chronic kidney disease, without long-term current use of insulin (HCC)    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 and stage 3b chronic kidney disease (HCC)    Tortuous aorta    Moderate pulmonary hypertension (HCC)    Age-related nuclear cataract of both eyes    S/P bilateral cataract extraction    History of breast cancer   [5]   Past Surgical History:  Procedure Laterality Date    Colonoscopy N/A 2/14/2019    SSA, adenomas- repeat 3 yrs    Colonoscopy N/A 1/12/2024    Procedure: COLONOSCOPY with forcep and cold snare polypectomy;  Surgeon: Yahir Cast DO;  Location:  ENDOSCOPY    Colonoscopy & polypectomy  8/15    polyps; tics; repeat 3 yrs (adenoma)    Colonoscopy,biopsy  8/19/2015    Procedure: ;  Surgeon: Ismael Vazquez MD;  Location: [facility]    Colonoscopy,remv lesn,snare N/A 8/19/2015    Procedure: COLONOSCOPY, POSSIBLE BIOPSY, POSSIBLE POLYPECTOMY 76674;  Surgeon: Ismael Vazquez MD;  Location: [facility]    Parathyroidectomy      Total knee replacement Left 11/22/16    Dr Higgins    Total knee replacement  Right 2/28/17    Dr Higgins   [6]   Family History  Problem Relation Age of Onset    Arthritis Mother     Diabetes Mother    [7]   Social History  Socioeconomic History    Marital status:    Tobacco Use    Smoking status: Never    Smokeless tobacco: Never   Vaping Use    Vaping status: Never Used   Substance and Sexual Activity    Alcohol use: Yes     Alcohol/week: 0.0 standard drinks of alcohol     Comment: once or twice a year    Drug use: No     Social Drivers of Health     Food Insecurity: No Food Insecurity (4/4/2025)    NCSS - Food Insecurity     Worried About Running Out of Food in the Last Year: No     Ran Out of Food in the Last Year: No   Transportation Needs: No Transportation Needs (4/4/2025)    NCSS - Transportation     Lack of Transportation: No   Housing Stability: Not At Risk (4/4/2025)    NCSS - Housing/Utilities     Has Housing: Yes     Worried About Losing Housing: No     Unable to Get Utilities: No

## 2025-04-30 ENCOUNTER — LAB SERVICES (OUTPATIENT)
Dept: LAB | Age: 80
End: 2025-04-30
Attending: INTERNAL MEDICINE

## 2025-04-30 ENCOUNTER — OFFICE VISIT (OUTPATIENT)
Dept: HEMATOLOGY/ONCOLOGY | Age: 80
End: 2025-04-30
Attending: INTERNAL MEDICINE

## 2025-04-30 VITALS
SYSTOLIC BLOOD PRESSURE: 142 MMHG | HEART RATE: 85 BPM | DIASTOLIC BLOOD PRESSURE: 69 MMHG | BODY MASS INDEX: 43.4 KG/M2 | WEIGHT: 293 LBS | TEMPERATURE: 98 F | RESPIRATION RATE: 18 BRPM | HEIGHT: 69 IN | OXYGEN SATURATION: 96 %

## 2025-04-30 DIAGNOSIS — Z12.31 ENCOUNTER FOR SCREENING MAMMOGRAM FOR MALIGNANT NEOPLASM OF BREAST: ICD-10-CM

## 2025-04-30 DIAGNOSIS — C50.312 MALIGNANT NEOPLASM OF LOWER-INNER QUADRANT OF LEFT BREAST IN FEMALE, ESTROGEN RECEPTOR POSITIVE (CMD): Primary | ICD-10-CM

## 2025-04-30 DIAGNOSIS — Z17.0 MALIGNANT NEOPLASM OF LOWER-INNER QUADRANT OF LEFT BREAST IN FEMALE, ESTROGEN RECEPTOR POSITIVE (CMD): ICD-10-CM

## 2025-04-30 DIAGNOSIS — C50.312 MALIGNANT NEOPLASM OF LOWER-INNER QUADRANT OF LEFT BREAST IN FEMALE, ESTROGEN RECEPTOR POSITIVE (CMD): ICD-10-CM

## 2025-04-30 DIAGNOSIS — Z17.0 MALIGNANT NEOPLASM OF LOWER-INNER QUADRANT OF LEFT BREAST IN FEMALE, ESTROGEN RECEPTOR POSITIVE (CMD): Primary | ICD-10-CM

## 2025-04-30 LAB
ALBUMIN SERPL-MCNC: 3.5 G/DL (ref 3.4–5)
ALBUMIN/GLOB SERPL: 0.9 {RATIO} (ref 1–2.4)
ALP SERPL-CCNC: 67 UNITS/L (ref 45–117)
ALT SERPL-CCNC: 29 UNITS/L
ANION GAP SERPL CALC-SCNC: 10 MMOL/L (ref 7–19)
AST SERPL-CCNC: 17 UNITS/L
BASOPHILS # BLD: 0 K/MCL (ref 0–0.3)
BASOPHILS NFR BLD: 1 %
BILIRUB SERPL-MCNC: 0.4 MG/DL (ref 0.2–1)
BUN SERPL-MCNC: 24 MG/DL (ref 6–20)
BUN/CREAT SERPL: 26 (ref 7–25)
CALCIUM SERPL-MCNC: 9.6 MG/DL (ref 8.4–10.2)
CEA SERPL-MCNC: <2 NG/ML (ref 0–5)
CHLORIDE SERPL-SCNC: 111 MMOL/L (ref 97–110)
CO2 SERPL-SCNC: 28 MMOL/L (ref 21–32)
CREAT SERPL-MCNC: 0.91 MG/DL (ref 0.51–0.95)
DEPRECATED RDW RBC: 51.1 FL (ref 39–50)
EGFRCR SERPLBLD CKD-EPI 2021: 64 ML/MIN/{1.73_M2}
EOSINOPHIL # BLD: 0.2 K/MCL (ref 0–0.5)
EOSINOPHIL NFR BLD: 2 %
ERYTHROCYTE [DISTWIDTH] IN BLOOD: 14.6 % (ref 11–15)
FASTING DURATION TIME PATIENT: ABNORMAL H
GLOBULIN SER-MCNC: 3.8 G/DL (ref 2–4)
GLUCOSE SERPL-MCNC: 112 MG/DL (ref 70–99)
HCT VFR BLD CALC: 48.3 % (ref 36–46.5)
HGB BLD-MCNC: 14.9 G/DL (ref 12–15.5)
IMM GRANULOCYTES # BLD AUTO: 0 K/MCL (ref 0–0.2)
IMM GRANULOCYTES # BLD: 1 %
LYMPHOCYTES # BLD: 1.8 K/MCL (ref 1–4)
LYMPHOCYTES NFR BLD: 21 %
MCH RBC QN AUTO: 29.6 PG (ref 26–34)
MCHC RBC AUTO-ENTMCNC: 30.8 G/DL (ref 32–36.5)
MCV RBC AUTO: 95.8 FL (ref 78–100)
MONOCYTES # BLD: 0.6 K/MCL (ref 0.3–0.9)
MONOCYTES NFR BLD: 7 %
NEUTROPHILS # BLD: 6.1 K/MCL (ref 1.8–7.7)
NEUTROPHILS NFR BLD: 68 %
NRBC BLD MANUAL-RTO: 0 /100 WBC
PLATELET # BLD AUTO: 207 K/MCL (ref 140–450)
POTASSIUM SERPL-SCNC: 4.7 MMOL/L (ref 3.4–5.1)
PROT SERPL-MCNC: 7.3 G/DL (ref 6.4–8.2)
RBC # BLD: 5.04 MIL/MCL (ref 4–5.2)
SODIUM SERPL-SCNC: 144 MMOL/L (ref 135–145)
WBC # BLD: 8.7 K/MCL (ref 4.2–11)

## 2025-04-30 PROCEDURE — 99215 OFFICE O/P EST HI 40 MIN: CPT | Performed by: INTERNAL MEDICINE

## 2025-04-30 PROCEDURE — 82378 CARCINOEMBRYONIC ANTIGEN: CPT

## 2025-04-30 PROCEDURE — 86300 IMMUNOASSAY TUMOR CA 15-3: CPT

## 2025-04-30 PROCEDURE — 99211 OFF/OP EST MAY X REQ PHY/QHP: CPT

## 2025-04-30 PROCEDURE — 85025 COMPLETE CBC W/AUTO DIFF WBC: CPT

## 2025-04-30 PROCEDURE — 80053 COMPREHEN METABOLIC PANEL: CPT

## 2025-04-30 PROCEDURE — 36415 COLL VENOUS BLD VENIPUNCTURE: CPT

## 2025-04-30 ASSESSMENT — ENCOUNTER SYMPTOMS
BACK PAIN: 0
VOMITING: 0
APNEA: 0
ACTIVITY CHANGE: 0
DIARRHEA: 0
WHEEZING: 0
WEAKNESS: 0
NAUSEA: 0
ABDOMINAL PAIN: 0
TROUBLE SWALLOWING: 0
UNEXPECTED WEIGHT CHANGE: 0
CHILLS: 0
DIZZINESS: 0
BRUISES/BLEEDS EASILY: 0
CONFUSION: 0
SLEEP DISTURBANCE: 0
DIAPHORESIS: 0
SHORTNESS OF BREATH: 0
SPEECH DIFFICULTY: 0
FEVER: 0
BLOOD IN STOOL: 0
COUGH: 0
CHOKING: 0
CONSTIPATION: 0
FATIGUE: 0
HEADACHES: 0
CHEST TIGHTNESS: 0
APPETITE CHANGE: 0
LIGHT-HEADEDNESS: 0
ADENOPATHY: 0
ABDOMINAL DISTENTION: 0
VOICE CHANGE: 0

## 2025-04-30 ASSESSMENT — PATIENT HEALTH QUESTIONNAIRE - PHQ9: SUM OF ALL RESPONSES TO PHQ9 QUESTIONS 1 AND 2: 0

## 2025-04-30 ASSESSMENT — PAIN SCALES - GENERAL: PAINLEVEL_OUTOF10: 5

## 2025-05-01 LAB
CANCER AG15-3 SERPL-ACNC: 10 UNITS/ML (ref 0–32)
CANCER AG27-29 SERPL-ACNC: 19 UNITS/ML (ref 0–40)

## 2025-05-12 ENCOUNTER — TELEPHONE (OUTPATIENT)
Dept: HEMATOLOGY/ONCOLOGY | Age: 80
End: 2025-05-12

## 2025-05-15 DIAGNOSIS — I10 ESSENTIAL HYPERTENSION WITH GOAL BLOOD PRESSURE LESS THAN 140/90: ICD-10-CM

## 2025-05-15 RX ORDER — LISINOPRIL 10 MG/1
10 TABLET ORAL DAILY
Qty: 90 TABLET | Refills: 3 | Status: SHIPPED | OUTPATIENT
Start: 2025-05-15

## 2025-05-15 RX ORDER — INSULIN GLARGINE 300 U/ML
64 INJECTION, SOLUTION SUBCUTANEOUS DAILY
Qty: 21 ML | Refills: 3 | Status: SHIPPED | OUTPATIENT
Start: 2025-05-15

## 2025-05-21 ENCOUNTER — OFFICE VISIT (OUTPATIENT)
Dept: ORTHOPEDICS CLINIC | Facility: CLINIC | Age: 80
End: 2025-05-21
Payer: COMMERCIAL

## 2025-05-21 VITALS — HEIGHT: 68 IN | WEIGHT: 293 LBS | BODY MASS INDEX: 44.41 KG/M2

## 2025-05-21 DIAGNOSIS — M16.11 PRIMARY OSTEOARTHRITIS OF RIGHT HIP: Primary | ICD-10-CM

## 2025-05-21 DIAGNOSIS — E66.01 MORBID OBESITY (HCC): ICD-10-CM

## 2025-05-21 PROCEDURE — 1160F RVW MEDS BY RX/DR IN RCRD: CPT | Performed by: ORTHOPAEDIC SURGERY

## 2025-05-21 PROCEDURE — 3008F BODY MASS INDEX DOCD: CPT | Performed by: ORTHOPAEDIC SURGERY

## 2025-05-21 PROCEDURE — 1159F MED LIST DOCD IN RCRD: CPT | Performed by: ORTHOPAEDIC SURGERY

## 2025-05-21 PROCEDURE — 99203 OFFICE O/P NEW LOW 30 MIN: CPT | Performed by: ORTHOPAEDIC SURGERY

## 2025-05-21 NOTE — H&P
[facility] New Patient Note    CC:   Chief Complaint   Patient presents with    Hip Pain     Right hip pain   Requesting an injection would be first injection       HPI: This 80 year old female presents today with complaints of right hip pain.  She notes symptoms that have been ongoing for about 3 months.  She reports symptoms around the proximal thigh/groin, worse with walking and weightbearing.  Denies radiation of symptoms.  She does have a prescription for physical therapy but has not started yet.  She has been taking Tylenol which helps for the day that she takes it.  No prior history of any injections in the past.    Past Medical History[1]  Past Surgical History[2]  Current Medications[3]  Allergies[4]  Family History[5]  Social History     Occupational History    Not on file   Tobacco Use    Smoking status: Never    Smokeless tobacco: Never    Tobacco comments:     Updated 5/21/25   Vaping Use    Vaping status: Never Used   Substance and Sexual Activity    Alcohol use: Yes     Alcohol/week: 0.0 standard drinks of alcohol     Comment: once or twice a year    Drug use: No    Sexual activity: Not on file        ROS:  Detailed system review obtained and negative except as mentioned above      Physical Exam:    Ht 5' 8\" (1.727 m)   Wt (!) 302 lb 9.6 oz (137.3 kg)   BMI 46.01 kg/m²   Constitutional: Awake, alert, no distress.   Psychological: Appropriate affect.  Respiratory: Unlabored breathing.  Right lower extremity:  Inspection: skin intact, thickened soft tissue envelope about the hip and thigh  Palpation: No focal tenderness about the hip  Range of motion: Passive external rotation of the hip 25, internal rotation to neutral  Stinchfield negative  Neuromuscular: 5 out of 5 hip flexion strength  Vascular: Warm well-perfused      Imaging: X-rays of the right hip and pelvis personally viewed, independently interpreted and radiology report read.  Demonstrates Kellgren-Juan grade 3 moderate  degenerative changes with definite joint space loss, subchondral sclerosis and osteophyte formation.      Labs: Most recent hemoglobin A1c from 2 months ago was 7.2      Assessment/Plan:  Diagnoses and all orders for this visit:    Primary osteoarthritis of right hip    Morbid obesity (HCC)      Assessment: 80-year-old female with symptomatic radiographically moderate right hip osteoarthritis    Plan: I discussed the etiology, natural history, and management options for symptomatic hip osteoarthritis.  I discussed nonsurgical and surgical treatments, with nonsurgical treatments to include anti-inflammatory medications, injections, activity modification, weight loss, low impact exercise and possible therapy.  Surgery would be with hip replacement and is an elective operation reserved for when nonsurgical treatments no longer alleviate symptoms sufficiently.  She expressed understanding and hip injection is what she would like to proceed with.  I did discuss my recommendation for getting her set up with my partner and provided information on establishing with Dr. Escalona.  She does have a prescription for physical therapy that she is going to start as well.  All questions were answered.    Lui Parra MD, FAAOS  [facility]  Phone [phone]  Fax [phone]         [1]   Past Medical History:   Arthritis    Cancer (HCC)    breast    High blood pressure    High cholesterol    WIL (obstructive sleep apnea)    AHI 92 RDI 95 REM AHI 0 Supine  non-supine AHI 70 Sao2 Seven 79%/CPAP-10cpw w/ 5L O2    Type II or unspecified type diabetes mellitus without mention of complication, not stated as uncontrolled    Unspecified essential hypertension    Visual impairment   [2]   Past Surgical History:  Procedure Laterality Date    Colonoscopy N/A 2/14/2019    SSA, adenomas- repeat 3 yrs    Colonoscopy N/A 1/12/2024    Procedure: COLONOSCOPY with forcep and cold snare polypectomy;  Surgeon:  Yahir Cast, ;  Location:  ENDOSCOPY    Colonoscopy & polypectomy  8/15    polyps; tics; repeat 3 yrs (adenoma)    Colonoscopy,biopsy  8/19/2015    Procedure: ;  Surgeon: Ismael Vazquez MD;  [place] [facility] [place]    Colonoscopy,remv lesn,snare N/A 8/19/2015    Procedure: COLONOSCOPY, POSSIBLE BIOPSY, POSSIBLE POLYPECTOMY 87523;  Surgeon: Ismael Vazquez MD;  Location: [facility]    Parathyroidectomy      Total knee replacement Left 11/22/16    Dr Higgins    Total knee replacement Right 2/28/17    Dr Higgins   [3]   Current Outpatient Medications   Medication Sig Dispense Refill    lisinopril 10 MG Oral Tab Take 1 tablet (10 mg total) by mouth daily. 90 tablet 3    Insulin Glargine, 1 Unit Dial, (TOUJEO SOLOSTAR) 300 UNIT/ML Subcutaneous Solution Pen-injector Inject 64 Units/day into the skin daily. 21 mL 3    zoledronic acid 5 mg/100mL Intravenous Solution Inject 100 mL (5 mg total) into the vein once.      semaglutide (OZEMPIC, 1 MG/DOSE,) 4 MG/3ML Subcutaneous Solution Pen-injector Inject 1 mg into the skin once a week. 9 mL 3    Empagliflozin (JARDIANCE) 25 MG Oral Tab Take 1 tablet by mouth daily. 90 tablet 3    metFORMIN HCl 1000 MG Oral Tab Take 1 tablet (1,000 mg total) by mouth 2 (two) times daily with meals. 180 tablet 3    atorvastatin (LIPITOR) 40 MG Oral Tab Take 1 tablet (40 mg total) by mouth nightly. 90 tablet 3    torsemide 5 MG Oral Tab Take 1 tablet (5 mg total) by mouth daily. 90 tablet 3    Insulin Pen Needle (BD PEN NEEDLE BRYSON U/F) 32G X 4 MM Does not apply Misc Use daily with Toujeo 90 each 3    OneTouch Delica Lancets 33G Does not apply Misc TEST BLOOD SUGAR DAILY Non-Insulin Dependent Diabetes Type II. E11.9. 100 each 3    Blood Glucose Monitoring Suppl (ONETOUCH ULTRA 2) w/Device Does not apply Kit TEST BLOOD SUGAR ONCE DAILY Non-Insulin Dependent Diabetes Type II. E11.9. 1 kit 0    Glucose Blood (ONETOUCH ULTRA) In Vitro Strip TEST BLOOD SUGAR  ONCE DAILY Non-Insulin Dependent Diabetes Type II. E11.9. 100 strip 3    Calcium Carbonate-Vitamin D (CALCIUM + D OR) Take 1 tablet by mouth daily.      aspirin 81 MG Oral Tab Take 1 tablet (81 mg total) by mouth daily.     [4]   Allergies  Allergen Reactions    Pcn [Penicillins] UNKNOWN     Childhood reaction   [5]   Family History  Problem Relation Age of Onset    Arthritis Mother     Diabetes Mother

## 2025-05-30 ENCOUNTER — OFFICE VISIT (OUTPATIENT)
Facility: CLINIC | Age: 80
End: 2025-05-30
Payer: COMMERCIAL

## 2025-05-30 VITALS — WEIGHT: 293 LBS | HEIGHT: 68 IN | BODY MASS INDEX: 44.41 KG/M2

## 2025-05-30 DIAGNOSIS — E66.01 MORBID OBESITY (HCC): ICD-10-CM

## 2025-05-30 DIAGNOSIS — M16.11 PRIMARY OSTEOARTHRITIS OF RIGHT HIP: Primary | ICD-10-CM

## 2025-05-30 RX ORDER — TRIAMCINOLONE ACETONIDE 40 MG/ML
40 INJECTION, SUSPENSION INTRA-ARTICULAR; INTRAMUSCULAR ONCE
Status: COMPLETED | OUTPATIENT
Start: 2025-05-30 | End: 2025-05-30

## 2025-05-30 RX ADMIN — TRIAMCINOLONE ACETONIDE 40 MG: 40 INJECTION, SUSPENSION INTRA-ARTICULAR; INTRAMUSCULAR at 10:09:00

## 2025-05-30 NOTE — H&P
Sports Medicine Clinic Note    Subjective:    Chief Complaint: Right hip pain    History: 80-year-old female presents with a 3-month history of right hip pain localized to the proximal thigh and groin, exacerbated by walking and weightbearing. She has not experienced any radiation of symptoms. She has not previously undergone any injections for this issue. While she has a prescription for physical therapy, she has yet to initiate therapy. She reports some benefit from Tylenol on the days she takes it. She has a complex medical history including arthritis, diabetes, hypertension, hyperlipidemia, breast cancer, hepatocellular carcinoma, and obstructive sleep apnea. She denies tobacco use and has no prior right hip surgeries.    Objective:    Right Hip Examination:    Inspection: Skin intact with a thickened soft tissue envelope about the hip and thigh.  Palpation: No focal tenderness noted about the hip.  Range of Motion: Passive external rotation to 25 degrees, internal rotation to neutral.  Neurovascular: Strength 5/5 in hip flexion. Extremity warm and well-perfused.  Special Tests: Stinchfield test negative.    Diagnostic Tests:    Radiographs of the right hip and pelvis were personally reviewed and demonstrate Kellgren-Juan grade 3 moderate degenerative changes with definite joint space loss, subchondral sclerosis, and osteophyte formation.    Assessment:    Symptomatic right hip osteoarthritis with radiographic evidence of moderate degenerative changes.    Plan:    Procedures: Corticosteroid injection to the right hip joint performed today to provide symptom relief.  Therapy: Patient has a current prescription for physical therapy and plans to initiate.  Medications: Continue acetaminophen as needed. NSAIDs may be used with caution considering comorbidities.  Activity Recommendations: Engage in low-impact exercises. Avoid prolonged weightbearing and high-impact activities. Encourage gradual weight loss for  symptom relief.    Follow-Up: As planned to evaluate response to the injection and discuss further management options.    - - -    Ultrasound Guided Procedure Note:    Confirmed that the patient does not have history of prior adverse reactions, active infections, or relevant allergies. There was no erythema or warmth, and the skin was clear. After discussion of the risks and benefits, the patient elected to proceed with an ultrasound guided injection into the femoroacetabular space, right side:    The skin was sterilized with ChloraPrep. A 22 gauge needle was inserted via inferolateral approach utilizing US for needle guidance and placement. The site was injected with a mixture of 1 mL of triamcinolone 40 mg/mL and 2 mL of 1% Lidocaine without Epinephrine. The injection was completed without complication, and a bandage was applied. The patient tolerated the procedure well and was instructed to avoid strenuous activity for the next 24-48 hours and to use ice or Tylenol for pain as needed. The patient will call immediately with any signs of infection or allergic reaction.    Post-Injection Care: The patient tolerated the procedure well. An occlusive bandage was placed over the injection site. Post-injection care instructions provided to the patient. The patient was asked to avoid strenuous activity and continue to rest the area for 2-3 days before resuming regular activities. Patient advised that the area may be more painful for the first 1-2 days. They can use ice or Tylenol for pain as needed.  Patient was instructed to watch for fever, increased swelling, or persistent pain. The patient will call immediately with any signs of infection or allergic reaction.      Ranjan Escalona DO, CAM   [facility]

## 2025-06-23 ENCOUNTER — HOSPITAL ENCOUNTER (OUTPATIENT)
Dept: CT IMAGING | Age: 80
Discharge: HOME | End: 2025-06-23
Attending: INTERNAL MEDICINE

## 2025-06-23 DIAGNOSIS — C50.312 MALIGNANT NEOPLASM OF LOWER-INNER QUADRANT OF LEFT BREAST IN FEMALE, ESTROGEN RECEPTOR POSITIVE (CMD): ICD-10-CM

## 2025-06-23 DIAGNOSIS — Z17.0 MALIGNANT NEOPLASM OF LOWER-INNER QUADRANT OF LEFT BREAST IN FEMALE, ESTROGEN RECEPTOR POSITIVE (CMD): ICD-10-CM

## 2025-06-23 DIAGNOSIS — Z12.31 ENCOUNTER FOR SCREENING MAMMOGRAM FOR MALIGNANT NEOPLASM OF BREAST: ICD-10-CM

## 2025-06-23 PROCEDURE — 77067 SCR MAMMO BI INCL CAD: CPT

## 2025-07-11 ENCOUNTER — OFFICE VISIT (OUTPATIENT)
Dept: PHYSICAL THERAPY | Age: 80
End: 2025-07-11
Attending: FAMILY MEDICINE
Payer: MEDICARE

## 2025-07-11 ENCOUNTER — TELEPHONE (OUTPATIENT)
Dept: PHYSICAL THERAPY | Facility: HOSPITAL | Age: 80
End: 2025-07-11

## 2025-07-11 DIAGNOSIS — G89.29 CHRONIC RIGHT HIP PAIN: Primary | ICD-10-CM

## 2025-07-11 DIAGNOSIS — M25.551 CHRONIC RIGHT HIP PAIN: Primary | ICD-10-CM

## 2025-07-11 DIAGNOSIS — M16.11 PRIMARY OSTEOARTHRITIS OF RIGHT HIP: ICD-10-CM

## 2025-07-11 PROCEDURE — 97110 THERAPEUTIC EXERCISES: CPT

## 2025-07-11 PROCEDURE — 97162 PT EVAL MOD COMPLEX 30 MIN: CPT

## 2025-07-11 NOTE — PROGRESS NOTES
LOWER EXTREMITY EVALUATION:     Diagnosis:   Chronic right hip pain (M25.551,G89.29)  Primary osteoarthritis of right hip (M16.11) Patient:  Sasha Valdivia (80 year old, female)        Referring Provider: Hans Albarran  Today's Date   7/11/2025    Precautions:      Date of Evaluation: 07/11/25  Next MD visit: No data recorded  Date of Surgery: No data recorded     PATIENT SUMMARY     Summary of chief complaints: right posterior hip/buttock pain, some in groin, mostly in a.m., notes decreased endurance in general past several months  History of current condition: hip pain began about 4 months ago after doing a lot of stairs; had injection 1 mo ago wih some benefit. has done very little activity since onset; takes 625 mg Tylenol 2x/day   Pain level: current 1 /10, at best 1 /10, at worst 2 /10 (previously 6-7/10 before shot)  Description of symptoms: aching, sharp w/sit to stand   Occupation: retired   Leisure activities/Hobbies: meeting with group 1x/month, hasn't been doing due to parking/walking/stairs   Prior level of function: independent; cane for community mobility, no falls; 2 CHAPIN from garage w/rail  Current limitations: walking longer distance, standing >5-10 min, sit to stand  Pt goals: strengthen the muscles, work on endurance, standing longer  Past medical history was reviewed with Sasah.  Significant findings include: Bilateral TKA 2015; low BP noted at dentist (90/30, 100/53)    Imaging/Tests: CONCLUSION:   There is moderate right-sided osteoarthritis with moderate femoral acetabular impingement. There is a linear lucency traversing the AC cervical component of the right femoral neck either representing a collar/ring of osteophytes or a hairline fracture.  If pain persists CT of the right hip would be recommended. Mild degenerative change of the left hip joint.  Marked degenerative changes lower lumbar spine.  Pelvic surgical clips noted.     Sasha  has a past medical history of  Arthritis, Cancer (HCC), High blood pressure, High cholesterol, WIL (obstructive sleep apnea) (2/6/17-Split), Type II or unspecified type diabetes mellitus without mention of complication, not stated as uncontrolled, Unspecified essential hypertension, and Visual impairment.  She  has a past surgical history that includes colonoscopy & polypectomy (8/15); colonoscopy,remv lesn,snare (N/A, 8/19/2015); colonoscopy,biopsy (8/19/2015); parathyroidectomy; total knee replacement (Left, 11/22/16); total knee replacement (Right, 2/28/17); colonoscopy (N/A, 2/14/2019); and colonoscopy (N/A, 1/12/2024).    ASSESSMENT  Sasha presents to physical therapy evaluation with primary c/o right posterior hip/buttock pain, some in groin, mostly in a.m., notes decreased endurance in general past several months. The results of the objective tests and measures show decreased hip strength, decreased flexibility, impaired gait, balance and transfers; decreased endurance for functional activity;  decreased pt ed for HEP. Functional deficits include but are not limited to walking longer distance, standing >5-10 min, sit to stand. Signs and symptoms are consistent with diagnosis of Chronic right hip pain (M25.551,G89.29)  Primary osteoarthritis of right hip (M16.11). Pt and PT discussed evaluation findings, pathology, POC and HEP.  Pt voiced understanding and performs HEP correctly without reported pain. Skilled Physical Therapy is medically necessary to address the above impairments and reach functional goals.    OBJECTIVE:      Musculoskeletal  Observation: R>L  Palpation: mild TTP to glut med/max, ITB right LE   Accessory Motion:         ROM and Strength: (* denotes performed with pain)  Hip   ROM MMT (-/5)    R L R L     Flex (L2) 90+   3+ 3+     Ext  NT   partial bridge with much effort       Abd NT   3- (unable to obtain test position due to poor tolerance to S/L and glut weakness       ER limited   4- 4-     IR greater IR than ER in  supine 90 flexion   4 4    ,   Knee   MMT (-/5)    R L     Flex 5 5     Ext (L3) 5 5       Flexibility:    LE Flexibility R L     Hip Flexor         Hamstrings mod restricted       ITB         Piriformis mod restricted       Quads         Gastroc-soleus             Balance and Functional Mobility:  Gait: pt ambulates on level ground with path deviation with visual scanning; decreased step length; decreased stance phase (R trendelenburg, R>L genu valgum)   Tandem Stance: R back:  ; L back:    SLS: R 0 sec,  L 5 sec (w/UE support)  Functional Mobility:  5x sit to stand test: 23 sec (21\" height)   TU sec   Stairs:not tested     Today's Treatment and Response:   Pt education was provided on exam findings, treatment diagnosis, treatment plan, expectations, and prognosis.    Today's Treatment       2025   LE Treatment   Therapeutic Exercise Bent knee fallout x10 ea LE   Glut sets in Hooklying x10   Therapeutic Exercise Minutes 8   Total Time Of Timed Procedures 8   Total Time Of Service-Based Procedures 0   Total Treatment Time 8   HEP Verbal ed in ex's as above; will issue initial HEP next time        Patient was instructed in and issued a HEP for: Verbal ed in ex's as above; will issue initial HEP next time    Charges:  PT EVAL: Moderate Complexity,    In agreement with evaluation findings and clinical rationale, this evaluation involved MODERATE COMPLEXITY decision making due to 1-2 personal factors/comorbidities, 3 or more body structures involved/activity limitations, and evolving symptoms as documented in the evaluation.                                                                                                                PLAN OF CARE:      Goals: (to be met in 12 visits)    Not Met Progress Toward Partially Met Met   Pt will have improved hip AROM Flex to 100 deg to be able to don/doff shoes and perform sit to stand transfers without difficulty. [] [] [] []   Pt will improve hip ABD and ER  strength to 4/5 to increase ease with standing and walking. [] [] [] []   Pt to perform 5x sit to  18 seconds for indication of improved functional independence in the home.  [] [] [] []   Pt to reports improved standing/walking tolerance to 15 minutes for improved ability to attend monthly gathering w/decreased difficulty. [] [] [] []   Pt will demonstrate improved SLS to >5 seconds ELIO to promote safety and decrease risk of falls on uneven surfaces such as grass and gravel. [] [] [] []   Pt will perform TUG in <13 seconds, demonstrating improved gait speed for improved community ambulation. [] [] [] []   Pt will be independent and compliant with comprehensive HEP to maintain progress achieved in PT. [] [] [] []        Frequency / Duration: Patient will be seen 2x/week or a total of 12  visits over a 90 day period. Treatment will include: Manual Therapy; Gait training; Home Exercise Program instruction; Patient/Family Education; Neuromuscular Re-education; Self-Care Home Management; Therapeutic Exercise    Education or treatment limitation: None   Rehab Potential: good     LEFS Score  LEFS Score: (Patient-Rptd) 55 % (7/4/2025  9:24 AM)      Patient/Family/Caregiver was advised of these findings, precautions, and treatment options and has agreed to actively participate in planning and for this course of care.    Thank you for your referral. Please co-sign or sign and return this letter via fax as soon as possible to [phone]. If you have any questions, please contact me at Dept: [phone]    Sincerely,  Electronically signed by therapist: Armida Estarda PT  Physician's certification required: Yes  I certify the need for these services furnished under this plan of treatment and while under my care.    X___________________________________________________ Date____________________    Certification From: 7/11/2025  To: 10/9/2025

## 2025-07-14 ENCOUNTER — OFFICE VISIT (OUTPATIENT)
Dept: PHYSICAL THERAPY | Age: 80
End: 2025-07-14
Attending: FAMILY MEDICINE
Payer: MEDICARE

## 2025-07-14 PROCEDURE — 97140 MANUAL THERAPY 1/> REGIONS: CPT

## 2025-07-14 PROCEDURE — 97110 THERAPEUTIC EXERCISES: CPT

## 2025-07-14 NOTE — PROGRESS NOTES
Patient: Sasha Valdivia (80 year old, female) Referring Provider:  Insurance:   Diagnosis: Chronic right hip pain (M25.551,G89.29)  Primary osteoarthritis of right hip (M16.11) Hans Albarran  [employer]   Date of Surgery: No data recorded Next MD visit:  N/A   Precautions:    No data recorded Referral Information:    Date of Evaluation: Req: 8, Auth: 8, Exp: 9/30/2025 07/11/25 POC Auth Visits:          Today's Date   7/14/2025    Subjective          Pain: 2/10     Objective  \" My hip feels better and I am able to ambulate with less pain since I got the injection but I continue to feel a lot of tighntess in my right groin and hip flexor \".              Assessment  Initiated STM and gentle streches to R ITB and R quad follwed by manual hip flexor / quad stretch and piriformis and initiated hip strength activites in supine and side lying to promote improved distal trunk stability with gait . Patient reported significant tightness in her hip flexor with gait .    Goals (to be met in 12 visits)          t will have improved hip AROM Flex to 100 deg to be able to don/doff shoes and perform sit to stand transfers without difficulty. []  []  []  []    Pt will improve hip ABD and ER strength to 4/5 to increase ease with standing and walking. []  []  []  []    Pt to perform 5x sit to  18 seconds for indication of improved functional independence in the home.  []  []  []  []    Pt to reports improved standing/walking tolerance to 15 minutes for improved ability to attend monthly gathering w/decreased difficulty. []  []  []  []    Pt will demonstrate improved SLS to >5 seconds ELIO to promote safety and decrease risk of falls on uneven surfaces such as grass and gravel. []  []  []  []    Pt will perform TUG in <13 seconds, demonstrating improved gait speed for improved community ambulation. []  []  []  []    Pt will be independent and compliant with comprehensive HEP to maintain progress  achieved in PT. []              Plan  Conitnue PT as per current POC .    Treatment Last 4 Visits  Treatment Day: 2       7/11/2025 7/14/2025   LE Treatment   Therapeutic Exercise Bent knee fallout x10 ea LE   Glut sets in Hooklying x10 NU step x 6 min , level 5  R self piriformis stretch ( ER/IR ) 10 x 10 sec hold   Tra activation with :  Adductors activation with ball 2 x 10   Abductors activation with green TB 2 x 10   Side lying :  R AROM of clam shell 2 x 10   Manual Therapy  Side lying :  STM to R ITB   Supine :  STM to R hip flexor and quad   Hip flexor / quad manual stretch 10 x 10 sec hold   R manual piriformis stretch ( ER/IR ) X 10 x 10 sec hold    Therapeutic Exercise Minutes 8 30   Manual Therapy Minutes  15   Total Time Of Timed Procedures 8 45   Total Time Of Service-Based Procedures 0 0   Total Treatment Time 8 45   HEP Verbal ed in ex's as above; will issue initial HEP next time         HEP  Verbal ed in ex's as above; will issue initial HEP next time    Charges     there - ex x 2 , manual x 1

## 2025-07-21 RX ORDER — ATORVASTATIN CALCIUM 40 MG/1
40 TABLET, FILM COATED ORAL NIGHTLY
Qty: 90 TABLET | Refills: 3 | Status: SHIPPED | OUTPATIENT
Start: 2025-07-21

## 2025-07-21 RX ORDER — TORSEMIDE 5 MG/1
5 TABLET ORAL DAILY
Qty: 90 TABLET | Refills: 3 | Status: SHIPPED | OUTPATIENT
Start: 2025-07-21

## 2025-07-23 RX ORDER — TORSEMIDE 5 MG/1
5 TABLET ORAL DAILY
Qty: 90 TABLET | Refills: 3 | OUTPATIENT
Start: 2025-07-23

## 2025-07-23 RX ORDER — ATORVASTATIN CALCIUM 40 MG/1
40 TABLET, FILM COATED ORAL NIGHTLY
Qty: 90 TABLET | Refills: 3 | OUTPATIENT
Start: 2025-07-23

## 2025-07-23 NOTE — TELEPHONE ENCOUNTER
Duplicate refill request. Previously addressed     1 year supply for metformin, atorvastatin and torsemide sent to [facility] 7/21/25

## 2025-07-29 ENCOUNTER — OFFICE VISIT (OUTPATIENT)
Dept: PHYSICAL THERAPY | Age: 80
End: 2025-07-29
Attending: FAMILY MEDICINE
Payer: MEDICARE

## 2025-07-29 PROCEDURE — 97110 THERAPEUTIC EXERCISES: CPT

## 2025-07-29 PROCEDURE — 97140 MANUAL THERAPY 1/> REGIONS: CPT

## 2025-07-31 ENCOUNTER — OFFICE VISIT (OUTPATIENT)
Dept: PHYSICAL THERAPY | Age: 80
End: 2025-07-31
Attending: FAMILY MEDICINE
Payer: MEDICARE

## 2025-07-31 PROCEDURE — 97110 THERAPEUTIC EXERCISES: CPT

## 2025-07-31 PROCEDURE — 97140 MANUAL THERAPY 1/> REGIONS: CPT

## 2025-08-04 ENCOUNTER — OFFICE VISIT (OUTPATIENT)
Dept: PHYSICAL THERAPY | Age: 80
End: 2025-08-04
Attending: FAMILY MEDICINE

## 2025-08-04 PROCEDURE — 97140 MANUAL THERAPY 1/> REGIONS: CPT

## 2025-08-04 PROCEDURE — 97110 THERAPEUTIC EXERCISES: CPT

## 2025-08-08 ENCOUNTER — OFFICE VISIT (OUTPATIENT)
Dept: PHYSICAL THERAPY | Age: 80
End: 2025-08-08
Attending: FAMILY MEDICINE

## 2025-08-08 PROCEDURE — 97110 THERAPEUTIC EXERCISES: CPT

## 2025-08-13 ENCOUNTER — OFFICE VISIT (OUTPATIENT)
Dept: PHYSICAL THERAPY | Age: 80
End: 2025-08-13
Attending: FAMILY MEDICINE

## 2025-08-13 PROCEDURE — 97140 MANUAL THERAPY 1/> REGIONS: CPT

## 2025-08-13 PROCEDURE — 97110 THERAPEUTIC EXERCISES: CPT

## 2025-08-20 ENCOUNTER — OFFICE VISIT (OUTPATIENT)
Dept: PHYSICAL THERAPY | Age: 80
End: 2025-08-20
Attending: FAMILY MEDICINE

## 2025-08-20 PROCEDURE — 97110 THERAPEUTIC EXERCISES: CPT

## 2025-08-22 ENCOUNTER — OFFICE VISIT (OUTPATIENT)
Dept: PHYSICAL THERAPY | Age: 80
End: 2025-08-22
Attending: FAMILY MEDICINE

## 2025-08-22 PROCEDURE — 97110 THERAPEUTIC EXERCISES: CPT

## 2025-08-27 ENCOUNTER — OFFICE VISIT (OUTPATIENT)
Dept: PHYSICAL THERAPY | Age: 80
End: 2025-08-27
Attending: FAMILY MEDICINE

## 2025-08-27 PROCEDURE — 97110 THERAPEUTIC EXERCISES: CPT

## 2025-08-29 ENCOUNTER — OFFICE VISIT (OUTPATIENT)
Dept: PHYSICAL THERAPY | Age: 80
End: 2025-08-29
Attending: FAMILY MEDICINE

## 2025-08-29 PROCEDURE — 97110 THERAPEUTIC EXERCISES: CPT

## (undated) DEVICE — TRAP POLYP W/ 2 SPEC TY CLR MAGNIFYING WIND

## (undated) DEVICE — KIT VLV 5 PC AIR H2O SUCT BX ENDOGATOR CONN

## (undated) DEVICE — GIJAW SINGLE-USE BIOPSY FORCEPS WITH NEEDLE: Brand: GIJAW

## (undated) DEVICE — STERIS KITS

## (undated) DEVICE — LASSO POLYPECTOMY SNARE: Brand: LASSO

## (undated) DEVICE — 3M™ RED DOT™ MONITORING ELECTRODE WITH FOAM TAPE AND STICKY GEL 2570-3, 3/BAG, 200/CASE, 54/PLT: Brand: RED DOT™

## (undated) DEVICE — 10FT COMBINED O2 DELIVERY/CO2 MONITORING. FILTER WITH MICROSTREAM TYPE LUER: Brand: DUAL ADULT NASAL CANNULA

## (undated) DEVICE — 1200CC GUARDIAN II: Brand: GUARDIAN